# Patient Record
Sex: MALE | Race: WHITE | NOT HISPANIC OR LATINO | Employment: OTHER | URBAN - METROPOLITAN AREA
[De-identification: names, ages, dates, MRNs, and addresses within clinical notes are randomized per-mention and may not be internally consistent; named-entity substitution may affect disease eponyms.]

---

## 2017-01-11 ENCOUNTER — ANTICOAGULATION THERAPY VISIT (OUTPATIENT)
Dept: ANTICOAGULATION | Facility: CLINIC | Age: 77
End: 2017-01-11

## 2017-01-11 DIAGNOSIS — I48.91 ATRIAL FIBRILLATION, UNSPECIFIED TYPE (H): ICD-10-CM

## 2017-01-11 DIAGNOSIS — Z79.01 LONG-TERM (CURRENT) USE OF ANTICOAGULANTS: Primary | ICD-10-CM

## 2017-01-11 LAB — INR PPP: 2.7

## 2017-01-11 NOTE — PROGRESS NOTES
ANTICOAGULATION FOLLOW-UP CLINIC VISIT    Patient Name:  Fredrick Mehta  Date:  1/11/2017  Contact Type:  Telephone    SUBJECTIVE:     Patient Findings     Positives Unexplained INR or factor level change           OBJECTIVE    INR   Date Value Ref Range Status   01/11/2017 2.7  Final       ASSESSMENT / PLAN  INR assessment SUPRA    Recheck INR In: 2 WEEKS    INR Location Outside lab      Anticoagulation Summary as of 1/11/2017     INR goal 2.0-2.5   Selected INR 2.7! (1/11/2017)   Maintenance plan 7.5 mg (5 mg x 1.5) on Sun, Tue, Thu; 5 mg (5 mg x 1) all other days   Full instructions 7.5 mg on Sun, Tue, Thu; 5 mg all other days   Weekly total 42.5 mg   Plan last modified Gracie Carrington RN (1/11/2017)   Next INR check 1/30/2017   Priority INR   Target end date     Indications   Long-term (current) use of anticoagulants [Z79.01] [Z79.01]  Atrial fibrillation (H) [I48.91]         Anticoagulation Episode Summary     INR check location     Preferred lab     Send INR reminders to [facility]    Comments ++New Goal Range 8/3/16  Dr Sarkar  2.0 to 2.5++ Contact at [phone]. Labs in [place].      Anticoagulation Care Providers     Provider Role Specialty Phone number    Yobani Sarkar MD [facility] Cardiology [phone]            See the Encounter Report to view Anticoagulation Flowsheet and Dosing Calendar (Go to Encounters tab in chart review, and find the Anticoagulation Therapy Visit)    Spoke with Fredrick. The only change he reports is now being in [place]. Will reduce dose and check in 2 weeks.     Gracie Carrington, RN

## 2017-01-24 LAB — INR PPP: 2.1

## 2017-01-25 ENCOUNTER — ANTICOAGULATION THERAPY VISIT (OUTPATIENT)
Dept: ANTICOAGULATION | Facility: CLINIC | Age: 77
End: 2017-01-25

## 2017-01-25 DIAGNOSIS — I48.91 ATRIAL FIBRILLATION, UNSPECIFIED TYPE (H): ICD-10-CM

## 2017-01-25 DIAGNOSIS — Z79.01 LONG-TERM (CURRENT) USE OF ANTICOAGULANTS: Primary | ICD-10-CM

## 2017-01-25 NOTE — PROGRESS NOTES
ANTICOAGULATION FOLLOW-UP CLINIC VISIT    Patient Name:  Fredrick Mehta  Date:  1/25/2017  Contact Type:  Telephone    SUBJECTIVE:     Patient Findings     Positives No Problem Findings           OBJECTIVE    INR   Date Value Ref Range Status   01/24/2017 2.10  Final     Comment:     Outside Lab       ASSESSMENT / PLAN  INR assessment THER    Recheck INR In: 2 WEEKS    INR Location Outside lab      Anticoagulation Summary as of 1/25/2017     INR goal 2.0-2.5   Selected INR 2.10 (1/24/2017)   Maintenance plan 7.5 mg (5 mg x 1.5) on Sun, Tue, Thu; 5 mg (5 mg x 1) all other days   Full instructions 7.5 mg on Sun, Tue, Thu; 5 mg all other days   Weekly total 42.5 mg   Plan last modified Gracie Carrington RN (1/11/2017)   Next INR check 2/8/2017   Priority INR   Target end date     Indications   Long-term (current) use of anticoagulants [Z79.01] [Z79.01]  Atrial fibrillation (H) [I48.91]         Anticoagulation Episode Summary     INR check location     Preferred lab     Send INR reminders to [facility]    Comments ++New Goal Range 8/3/16  Dr Sarkar  2.0 to 2.5++ Contact at [phone]. Labs in [place].      Anticoagulation Care Providers     Provider Role Specialty Phone number    Yobani Sarkar MD [facility] Cardiology [phone]            See the Encounter Report to view Anticoagulation Flowsheet and Dosing Calendar (Go to Encounters tab in chart review, and find the Anticoagulation Therapy Visit)    Spoke with Fredrick. Will get another INR in two weeks and if ok then will go back to 4 weeks    Fiona Wiley RN

## 2017-02-08 LAB — INR PPP: 2.4

## 2017-02-09 ENCOUNTER — ANTICOAGULATION THERAPY VISIT (OUTPATIENT)
Dept: ANTICOAGULATION | Facility: CLINIC | Age: 77
End: 2017-02-09

## 2017-02-09 DIAGNOSIS — Z79.01 LONG-TERM (CURRENT) USE OF ANTICOAGULANTS: Primary | ICD-10-CM

## 2017-02-09 DIAGNOSIS — I48.91 ATRIAL FIBRILLATION, UNSPECIFIED TYPE (H): ICD-10-CM

## 2017-02-09 NOTE — PROGRESS NOTES
ANTICOAGULATION FOLLOW-UP CLINIC VISIT    Patient Name:  Fredrick Mehta  Date:  2/9/2017  Contact Type:  Telephone    SUBJECTIVE:     Patient Findings     Positives No Problem Findings           OBJECTIVE    INR   Date Value Ref Range Status   02/08/2017 2.4  Final       ASSESSMENT / PLAN  INR assessment THER    Recheck INR In: 4 WEEKS    INR Location Outside lab      Anticoagulation Summary as of 2/9/2017     INR goal 2.0-2.5   Selected INR 2.4 (2/8/2017)   Maintenance plan 7.5 mg (5 mg x 1.5) on Sun, Tue, Thu; 5 mg (5 mg x 1) all other days   Full instructions 7.5 mg on Sun, Tue, Thu; 5 mg all other days   Weekly total 42.5 mg   No change documented Dana Matthews, RN   Plan last modified Gracie Carrington RN (1/11/2017)   Next INR check 3/9/2017   Priority INR   Target end date     Indications   Long-term (current) use of anticoagulants [Z79.01] [Z79.01]  Atrial fibrillation (H) [I48.91]         Anticoagulation Episode Summary     INR check location     Preferred lab     Send INR reminders to [facility]    Comments ++New Goal Range 8/3/16  Dr Sarkar  2.0 to 2.5++ Contact at [phone]. Labs in [place].      Anticoagulation Care Providers     Provider Role Specialty Phone number    Yobani Sarkar MD [facility] Cardiology [phone]            See the Encounter Report to view Anticoagulation Flowsheet and Dosing Calendar (Go to Encounters tab in chart review, and find the Anticoagulation Therapy Visit)    Spoke with Fredrick.    Dana Matthews, RN

## 2017-02-20 DIAGNOSIS — Z79.01 LONG TERM CURRENT USE OF ANTICOAGULANT THERAPY: ICD-10-CM

## 2017-02-20 RX ORDER — WARFARIN SODIUM 5 MG/1
TABLET ORAL
Qty: 135 TABLET | Refills: 0 | Status: SHIPPED | OUTPATIENT
Start: 2017-02-20 | End: 2017-06-20

## 2017-02-22 DIAGNOSIS — H40.9 GLAUCOMA: Primary | ICD-10-CM

## 2017-02-22 NOTE — TELEPHONE ENCOUNTER
Travatan  Last Written Prescription Date:  Historical  No dose listed but refill request is for travatan Z 0.004%  Last Office Visit : 11/16/16  Future Office visit:  5/24/17  Progress Notes  Encounter Date: 11/16/2016  Kim Joel MD   Ophthalmology      Primary open angle glaucoma (POAG) mod  Selected laser trabeculoplasty (SLT) left eye 8-1-14     HPI: Overall feels vision stable, no significant changes since last visit. Reports good drop compliance.    Current eye meds:  Travatan at bedtime both eyes     Octopus 24-2 visual field 11/16/16  RE: trace nasal step, improved MD  LE: Superior and inferior arcuate defects- worse MD, could be from cataract     Assessment & Plan:  1. Primary open angle glaucoma (POAG), moderate.  S/p Selected laser trabeculoplasty (SLT) right eye 2007  S/p Selected laser trabeculoplasty (SLT) left eye Aug 2014   Using Tavatan at bedtime both eyes  Octopus 24-2 stable - slightly worse MD left eye (follow closely)     2. Cataract both eyes still 20/20 or better     3. Narrow angles   S/p periperal iridotomy both eyes - patent     Plan:  Visual fields today relatively stable again today, some fluctuation. Trace nasal step OD  IOPs great today on current regimen.   Return in 6 months for visual field 24-2 both eyes  Continue Travatan at bedtime, both eyes        Alberto Lyons MD  PGY3, Dept of Ophthalmology               Routing refill request to provider for review/approval because:  Medication is reported/historical

## 2017-02-24 DIAGNOSIS — H40.1132 PRIMARY OPEN ANGLE GLAUCOMA OF BOTH EYES, MODERATE STAGE: Primary | ICD-10-CM

## 2017-03-05 DIAGNOSIS — E78.5 HYPERLIPIDEMIA LDL GOAL <100: ICD-10-CM

## 2017-03-06 RX ORDER — SIMVASTATIN 40 MG
TABLET ORAL
Qty: 90 TABLET | Refills: 3 | Status: SHIPPED | OUTPATIENT
Start: 2017-03-06 | End: 2017-04-03

## 2017-03-10 LAB — INR PPP: 2.4

## 2017-03-13 ENCOUNTER — ANTICOAGULATION THERAPY VISIT (OUTPATIENT)
Dept: ANTICOAGULATION | Facility: CLINIC | Age: 77
End: 2017-03-13

## 2017-03-13 DIAGNOSIS — Z79.01 LONG-TERM (CURRENT) USE OF ANTICOAGULANTS: ICD-10-CM

## 2017-03-13 DIAGNOSIS — I48.91 ATRIAL FIBRILLATION, UNSPECIFIED TYPE (H): ICD-10-CM

## 2017-03-13 NOTE — PROGRESS NOTES
ANTICOAGULATION FOLLOW-UP CLINIC VISIT    Patient Name:  Fredrick Mehta  Date:  3/13/2017  Contact Type:  Telephone    SUBJECTIVE:        OBJECTIVE    INR   Date Value Ref Range Status   03/10/2017 2.40  Final     Comment:     Outside Lab        ASSESSMENT / PLAN  INR assessment THER    Recheck INR In: 4 WEEKS    INR Location Outside lab      Anticoagulation Summary as of 3/13/2017     INR goal 2.0-2.5   Today's INR 2.40 (3/10/2017)   Maintenance plan 7.5 mg (5 mg x 1.5) on Sun, Tue, Thu; 5 mg (5 mg x 1) all other days   Full instructions 7.5 mg on Sun, Tue, Thu; 5 mg all other days   Weekly total 42.5 mg   Plan last modified Gracie Carrington RN (1/11/2017)   Next INR check 4/7/2017   Priority INR   Target end date     Indications   Long-term (current) use of anticoagulants [Z79.01] [Z79.01]  Atrial fibrillation (H) [I48.91]         Anticoagulation Episode Summary     INR check location     Preferred lab     Send INR reminders to [facility]    Comments ++New Goal Range 8/3/16  Dr Sarkar  2.0 to 2.5++ Contact at [phone]. Labs in [place].      Anticoagulation Care Providers     Provider Role Specialty Phone number    Mello, Yobani Paniagua MD [facility] Cardiology [phone]            See the Encounter Report to view Anticoagulation Flowsheet and Dosing Calendar (Go to Encounters tab in chart review, and find the Anticoagulation Therapy Visit)    Left message for patient with results and dosing recommendations. Asked patient to call back to report any missed doses, falls, signs and symptoms of bleeding or clotting, any changes in health, medication, or diet. Asked patient to call back with any questions or concerns.     Fiona Wiley RN

## 2017-03-13 NOTE — MR AVS SNAPSHOT
Fredrick Mehta   3/13/2017   Anticoagulation Therapy Visit    Description:  76 year old male   Provider:  Fiona Wiley, RN   Department:  [facility]           INR as of 3/13/2017     Today's INR 2.40 (3/10/2017)      Anticoagulation Summary as of 3/13/2017     INR goal 2.0-2.5   Today's INR 2.40 (3/10/2017)   Full instructions 7.5 mg on Sun, Tue, Thu; 5 mg all other days   Next INR check 4/7/2017    Indications   Long-term (current) use of anticoagulants [Z79.01] [Z79.01]  Atrial fibrillation (H) [I48.91]         March 2017 Details    Sun Mon Tue Wed Thu Fri Sat        1               2               3               4                 5               6               7               8               9               10               11                 12               13      5 mg   See details      14      7.5 mg         15      5 mg         16      7.5 mg         17      5 mg         18      5 mg           19      7.5 mg         20      5 mg         21      7.5 mg         22      5 mg         23      7.5 mg         24      5 mg         25      5 mg           26      7.5 mg         27      5 mg         28      7.5 mg         29      5 mg         30      7.5 mg         31      5 mg           Date Details   03/13 This INR check               How to take your warfarin dose     To take:  5 mg Take 1 of the 5 mg tablets.    To take:  7.5 mg Take 1.5 of the 5 mg tablets.           April 2017 Details    Sun Mon Tue Wed Thu Fri Sat           1      5 mg           2      7.5 mg         3      5 mg         4      7.5 mg         5      5 mg         6      7.5 mg         7            8                 9               10               11               12               13               14               15                 16               17               18               19               20               21               22                 23               24               25               26               27                28               29                 30                      Date Details   No additional details    Date of next INR:  4/7/2017         How to take your warfarin dose     To take:  5 mg Take 1 of the 5 mg tablets.    To take:  7.5 mg Take 1.5 of the 5 mg tablets.

## 2017-03-28 ENCOUNTER — CARE COORDINATION (OUTPATIENT)
Dept: CARDIOLOGY | Facility: CLINIC | Age: 77
End: 2017-03-28

## 2017-03-28 DIAGNOSIS — E78.2 MIXED HYPERLIPIDEMIA: Primary | ICD-10-CM

## 2017-03-29 NOTE — PROGRESS NOTES
Received notification from patients insurance company regarding possible interaction with Simvastatin and Coumadin.    Date: 3/29/2017    Time of Call: 10:51 AM     Diagnosis:  Hyperlipidemia     [ TORB ] Ordering provider: Dr Yobani Sarkar  Order: Discontinue Simvastatin.  Start Pravastatin 20 mg daily.  Recheck fasting lipid panel in 4 months     Order received by: Shahid Arroyo RN     Follow-up/additional notes: Discussed recommendations with patient.  Patient states that he understands information provided and will call back with any further questions or concerns.

## 2017-04-03 RX ORDER — PRAVASTATIN SODIUM 20 MG
20 TABLET ORAL DAILY
Qty: 90 TABLET | Refills: 3 | Status: SHIPPED | OUTPATIENT
Start: 2017-04-03 | End: 2017-05-11 | Stop reason: DRUGHIGH

## 2017-04-07 LAB — INR PPP: 2.9

## 2017-04-10 ENCOUNTER — ANTICOAGULATION THERAPY VISIT (OUTPATIENT)
Dept: ANTICOAGULATION | Facility: CLINIC | Age: 77
End: 2017-04-10

## 2017-04-10 DIAGNOSIS — Z79.01 LONG-TERM (CURRENT) USE OF ANTICOAGULANTS: ICD-10-CM

## 2017-04-10 DIAGNOSIS — I48.91 ATRIAL FIBRILLATION, UNSPECIFIED TYPE (H): ICD-10-CM

## 2017-04-10 NOTE — PROGRESS NOTES
ANTICOAGULATION FOLLOW-UP CLINIC VISIT    Patient Name:  Fredrick Mehta  Date:  4/10/2017  Contact Type:  Telephone    SUBJECTIVE:     Patient Findings     Positives Unexplained INR or factor level change           OBJECTIVE    INR   Date Value Ref Range Status   04/07/2017 2.90  Final     Comment:     Outside Lab        ASSESSMENT / PLAN  INR assessment SUPRA    Recheck INR In: 4 WEEKS    INR Location Outside lab      Anticoagulation Summary as of 4/10/2017     INR goal 2.0-2.5   Today's INR 2.90! (4/7/2017)   Maintenance plan 7.5 mg (5 mg x 1.5) on Sun, Tue, Thu; 5 mg (5 mg x 1) all other days   Full instructions 4/10: 2.5 mg; Otherwise 7.5 mg on Sun, Tue, Thu; 5 mg all other days   Weekly total 42.5 mg   Plan last modified Gracie Carrington, RN (1/11/2017)   Next INR check 5/5/2017   Priority INR   Target end date     Indications   Long-term (current) use of anticoagulants [Z79.01] [Z79.01]  Atrial fibrillation (H) [I48.91]         Anticoagulation Episode Summary     INR check location     Preferred lab     Send INR reminders to [facility]    Comments ++New Goal Range 8/3/16  Dr Sarkar  2.0 to 2.5++ Contact at [phone]. Labs in [place].      Anticoagulation Care Providers     Provider Role Specialty Phone number    Yobani Sarkar MD [facility] Cardiology [phone]            See the Encounter Report to view Anticoagulation Flowsheet and Dosing Calendar (Go to Encounters tab in chart review, and find the Anticoagulation Therapy Visit)    Spoke with Fredrick Wiley, TEMO

## 2017-04-10 NOTE — MR AVS SNAPSHOT
Fredrick Mehta   4/10/2017   Anticoagulation Therapy Visit    Description:  76 year old male   Provider:  Fiona Wiley, RN   Department:  [facility]           INR as of 4/10/2017     Today's INR 2.90! (4/7/2017)      Anticoagulation Summary as of 4/10/2017     INR goal 2.0-2.5   Today's INR 2.90! (4/7/2017)   Full instructions 4/10: 2.5 mg; Otherwise 7.5 mg on Sun, Tue, Thu; 5 mg all other days   Next INR check 5/5/2017    Indications   Long-term (current) use of anticoagulants [Z79.01] [Z79.01]  Atrial fibrillation (H) [I48.91]         April 2017 Details    Sun Mon Tue Wed Thu Fri Sat           1                 2               3               4               5               6               7               8                 9               10      2.5 mg   See details      11      7.5 mg         12      5 mg         13      7.5 mg         14      5 mg         15      5 mg           16      7.5 mg         17      5 mg         18      7.5 mg         19      5 mg         20      7.5 mg         21      5 mg         22      5 mg           23      7.5 mg         24      5 mg         25      7.5 mg         26      5 mg         27      7.5 mg         28      5 mg         29      5 mg           30      7.5 mg                Date Details   04/10 This INR check               How to take your warfarin dose     To take:  2.5 mg Take 0.5 of a 5 mg tablet.    To take:  5 mg Take 1 of the 5 mg tablets.    To take:  7.5 mg Take 1.5 of the 5 mg tablets.           May 2017 Details    Sun Mon Tue Wed Thu Fri Sat      1      5 mg         2      7.5 mg         3      5 mg         4      7.5 mg         5            6                 7               8               9               10               11               12               13                 14               15               16               17               18               19               20                 21               22               23               24                25               26               27                 28               29               30               31                   Date Details   No additional details    Date of next INR:  5/5/2017         How to take your warfarin dose     To take:  5 mg Take 1 of the 5 mg tablets.    To take:  7.5 mg Take 1.5 of the 5 mg tablets.

## 2017-04-13 DIAGNOSIS — I48.0 PAROXYSMAL ATRIAL FIBRILLATION (H): ICD-10-CM

## 2017-04-17 RX ORDER — DILTIAZEM HYDROCHLORIDE 120 MG/1
CAPSULE, EXTENDED RELEASE ORAL
Qty: 180 CAPSULE | Refills: 3 | Status: SHIPPED | OUTPATIENT
Start: 2017-04-17 | End: 2017-08-02

## 2017-05-04 ENCOUNTER — TELEPHONE (OUTPATIENT)
Dept: CARDIOLOGY | Facility: CLINIC | Age: 77
End: 2017-05-04

## 2017-05-04 NOTE — TELEPHONE ENCOUNTER
Patients wife called asking about her husbands medication: TAZTIA  MG 24 hr ER beaded capsule.  She said her  has been taking one capsule a day but she just read the bottle and it says take one capsule twice a day.  She would like someone to call her back (she said to leave a message).

## 2017-05-08 ENCOUNTER — ANTICOAGULATION THERAPY VISIT (OUTPATIENT)
Dept: ANTICOAGULATION | Facility: CLINIC | Age: 77
End: 2017-05-08

## 2017-05-08 DIAGNOSIS — I48.91 ATRIAL FIBRILLATION, UNSPECIFIED TYPE (H): ICD-10-CM

## 2017-05-08 DIAGNOSIS — Z79.01 LONG-TERM (CURRENT) USE OF ANTICOAGULANTS: ICD-10-CM

## 2017-05-08 NOTE — MR AVS SNAPSHOT
Fredrick Janine   5/8/2017   Anticoagulation Therapy Visit    Description:  76 year old male   Provider:  Gracie Carrington, RN   Department:  [facility]           INR as of 5/8/2017     Today's INR       Anticoagulation Summary as of 5/8/2017     INR goal 2.0-2.5   Today's INR    Next INR check     Indications   Long-term (current) use of anticoagulants [Z79.01] [Z79.01]  Atrial fibrillation (H) [I48.91]         April 2017 Details    Sun Mon Tue Wed Thu Fri Sat           1                 2               3               4               5               6               7               8                 9               10      2.5 mg   See details      11      7.5 mg         12      5 mg         13      7.5 mg         14      5 mg         15      5 mg           16      7.5 mg         17      5 mg         18      7.5 mg         19      5 mg         20      7.5 mg         21      5 mg         22      5 mg           23      7.5 mg         24      5 mg         25      7.5 mg         26      5 mg         27      7.5 mg         28      5 mg         29      5 mg           30      7.5 mg                Date Details   04/10 This INR check               How to take your warfarin dose     To take:  2.5 mg Take 0.5 of a 5 mg tablet.    To take:  5 mg Take 1 of the 5 mg tablets.    To take:  7.5 mg Take 1.5 of the 5 mg tablets.           May 2017 Details    Sun Mon Tue Wed Thu Fri Sat      1      5 mg         2      7.5 mg         3      5 mg         4      7.5 mg         5            6                 7               8               9               10               11               12               13                 14               15               16               17               18               19               20                 21               22               23               24               25               26               27                 28               29               30               31                    Date Details   No additional details    Date of next INR:  5/5/2017         How to take your warfarin dose     To take:  5 mg Take 1 of the 5 mg tablets.    To take:  7.5 mg Take 1.5 of the 5 mg tablets.

## 2017-05-09 ENCOUNTER — ANTICOAGULATION THERAPY VISIT (OUTPATIENT)
Dept: ANTICOAGULATION | Facility: CLINIC | Age: 77
End: 2017-05-09

## 2017-05-09 DIAGNOSIS — Z79.01 LONG-TERM (CURRENT) USE OF ANTICOAGULANTS: ICD-10-CM

## 2017-05-09 DIAGNOSIS — I48.91 ATRIAL FIBRILLATION, UNSPECIFIED TYPE (H): ICD-10-CM

## 2017-05-09 DIAGNOSIS — E78.2 MIXED HYPERLIPIDEMIA: ICD-10-CM

## 2017-05-09 LAB
CHOLEST SERPL-MCNC: 201 MG/DL
HDLC SERPL-MCNC: 62 MG/DL
INR PPP: 2.04 (ref 0.86–1.14)
LDLC SERPL CALC-MCNC: 106 MG/DL
NONHDLC SERPL-MCNC: 139 MG/DL
TRIGL SERPL-MCNC: 161 MG/DL

## 2017-05-09 NOTE — MR AVS SNAPSHOT
Fredrick Mehta   5/9/2017   Anticoagulation Therapy Visit    Description:  76 year old male   Provider:  Fiona Wilye, RN   Department:  [facility]           INR as of 5/9/2017     Today's INR 2.04      Anticoagulation Summary as of 5/9/2017     INR goal 2.0-2.5   Today's INR 2.04   Full instructions 7.5 mg on Sun, Tue, Thu; 5 mg all other days   Next INR check 6/6/2017    Indications   Long-term (current) use of anticoagulants [Z79.01] [Z79.01]  Atrial fibrillation (H) [I48.91]         May 2017 Details    Sun Mon Tue Wed Thu Fri Sat      1               2               3               4               5               6                 7               8               9      7.5 mg   See details      10      5 mg         11      7.5 mg         12      5 mg         13      5 mg           14      7.5 mg         15      5 mg         16      7.5 mg         17      5 mg         18      7.5 mg         19      5 mg         20      5 mg           21      7.5 mg         22      5 mg         23      7.5 mg         24      5 mg         25      7.5 mg         26      5 mg         27      5 mg           28      7.5 mg         29      5 mg         30      7.5 mg         31      5 mg             Date Details   05/09 This INR check               How to take your warfarin dose     To take:  5 mg Take 1 of the 5 mg tablets.    To take:  7.5 mg Take 1.5 of the 5 mg tablets.           June 2017 Details    Sun Mon Tue Wed Thu Fri Sat         1      7.5 mg         2      5 mg         3      5 mg           4      7.5 mg         5      5 mg         6            7               8               9               10                 11               12               13               14               15               16               17                 18               19               20               21               22               23               24                 25               26               27               28                29 30                 Date Details   No additional details    Date of next INR:  6/6/2017         How to take your warfarin dose     To take:  5 mg Take 1 of the 5 mg tablets.    To take:  7.5 mg Take 1.5 of the 5 mg tablets.

## 2017-05-09 NOTE — PROGRESS NOTES
ANTICOAGULATION FOLLOW-UP CLINIC VISIT    Patient Name:  Fredrick Mehta  Date:  5/9/2017  Contact Type:  Telephone    SUBJECTIVE:        OBJECTIVE    INR   Date Value Ref Range Status   05/09/2017 2.04 (H) 0.86 - 1.14 Final       ASSESSMENT / PLAN  INR assessment THER    Recheck INR In: 4 WEEKS    INR Location Clinic      Anticoagulation Summary as of 5/9/2017     INR goal 2.0-2.5   Today's INR 2.04   Maintenance plan 7.5 mg (5 mg x 1.5) on Sun, Tue, Thu; 5 mg (5 mg x 1) all other days   Full instructions 7.5 mg on Sun, Tue, Thu; 5 mg all other days   Weekly total 42.5 mg   Plan last modified Gracie Carrington RN (1/11/2017)   Next INR check 6/6/2017   Priority INR   Target end date     Indications   Long-term (current) use of anticoagulants [Z79.01] [Z79.01]  Atrial fibrillation (H) [I48.91]         Anticoagulation Episode Summary     INR check location     Preferred lab     Send INR reminders to [facility]    Comments ++New Goal Range 8/3/16  Dr Sarkar  2.0 to 2.5++ Contact at [phone]. Labs in [place].      Anticoagulation Care Providers     Provider Role Specialty Phone number    Yobani Sarkar MD [facility] Cardiology [phone]            See the Encounter Report to view Anticoagulation Flowsheet and Dosing Calendar (Go to Encounters tab in chart review, and find the Anticoagulation Therapy Visit)    Left message for patient with results and dosing recommendations. Asked patient to call back to report any missed doses, falls, signs and symptoms of bleeding or clotting, any changes in health, medication, or diet. Asked patient to call back with any questions or concerns.     Fiona Wiley RN

## 2017-05-11 ENCOUNTER — CARE COORDINATION (OUTPATIENT)
Dept: CARDIOLOGY | Facility: CLINIC | Age: 77
End: 2017-05-11

## 2017-05-11 DIAGNOSIS — E78.2 MIXED HYPERLIPIDEMIA: Primary | ICD-10-CM

## 2017-05-11 RX ORDER — PRAVASTATIN SODIUM 20 MG
20 TABLET ORAL DAILY
Qty: 90 TABLET | Refills: 3 | Status: SHIPPED | OUTPATIENT
Start: 2017-05-11 | End: 2018-06-18

## 2017-05-11 RX ORDER — PRAVASTATIN SODIUM 10 MG
10 TABLET ORAL DAILY
Qty: 90 TABLET | Refills: 3 | Status: SHIPPED | OUTPATIENT
Start: 2017-05-11 | End: 2018-03-10

## 2017-05-11 NOTE — PROGRESS NOTES
Date: 5/11/2017    Time of Call: 11:15 AM     Diagnosis:  Hyperlipidemia     [ TORB ] Ordering provider: Dr Yobani Sarkar  Order: Increase Pravastatin 30 mg every eveing     Order received by: Shahid Arroyo RN     Follow-up/additional notes: Patient called with results and recommendations from Dr Sarkar.  Patient states that he understands information provided and will call back with any further questions or concerns.

## 2017-05-16 ENCOUNTER — TRANSFERRED RECORDS (OUTPATIENT)
Dept: HEALTH INFORMATION MANAGEMENT | Facility: CLINIC | Age: 77
End: 2017-05-16

## 2017-06-06 ENCOUNTER — ANTICOAGULATION THERAPY VISIT (OUTPATIENT)
Dept: ANTICOAGULATION | Facility: CLINIC | Age: 77
End: 2017-06-06

## 2017-06-06 DIAGNOSIS — Z79.01 LONG-TERM (CURRENT) USE OF ANTICOAGULANTS: ICD-10-CM

## 2017-06-06 DIAGNOSIS — I48.91 ATRIAL FIBRILLATION, UNSPECIFIED TYPE (H): ICD-10-CM

## 2017-06-06 LAB — INR PPP: 2.3 (ref 0.86–1.14)

## 2017-06-06 NOTE — PROGRESS NOTES
ANTICOAGULATION FOLLOW-UP CLINIC VISIT    Patient Name:  Fredrick Mehta  Date:  6/6/2017  Contact Type:  Telephone    SUBJECTIVE:        OBJECTIVE    INR   Date Value Ref Range Status   06/06/2017 2.30 (H) 0.86 - 1.14 Final       ASSESSMENT / PLAN  INR assessment THER    Recheck INR In: 5 WEEKS    INR Location Clinic      Anticoagulation Summary as of 6/6/2017     INR goal 2.0-2.5   Today's INR 2.30   Maintenance plan 7.5 mg (5 mg x 1.5) on Sun, Tue, Thu; 5 mg (5 mg x 1) all other days   Full instructions 7.5 mg on Sun, Tue, Thu; 5 mg all other days   Weekly total 42.5 mg   Plan last modified Gracie Carrington RN (1/11/2017)   Next INR check 7/11/2017   Priority INR   Target end date     Indications   Long-term (current) use of anticoagulants [Z79.01] [Z79.01]  Atrial fibrillation (H) [I48.91]         Anticoagulation Episode Summary     INR check location     Preferred lab     Send INR reminders to [facility]    Comments ++New Goal Range 8/3/16  Dr Sarkar  2.0 to 2.5++ Contact at [phone]. Labs in [place].      Anticoagulation Care Providers     Provider Role Specialty Phone number    Yobani Sarkar MD [facility] Cardiology [phone]            See the Encounter Report to view Anticoagulation Flowsheet and Dosing Calendar (Go to Encounters tab in chart review, and find the Anticoagulation Therapy Visit)    Left message for patient with results and dosing recommendations. Asked patient to call back to report any missed doses, falls, signs and symptoms of bleeding or clotting, any changes in health, medication, or diet. Asked patient to call back with any questions or concerns.     Fiona Wiley RN

## 2017-06-06 NOTE — MR AVS SNAPSHOT
Fredrick Mehta   6/6/2017   Anticoagulation Therapy Visit    Description:  76 year old male   Provider:  Fiona Wiley, RN   Department:  [facility]           INR as of 6/6/2017     Today's INR 2.30      Anticoagulation Summary as of 6/6/2017     INR goal 2.0-2.5   Today's INR 2.30   Full instructions 7.5 mg on Sun, Tue, Thu; 5 mg all other days   Next INR check 7/11/2017    Indications   Long-term (current) use of anticoagulants [Z79.01] [Z79.01]  Atrial fibrillation (H) [I48.91]         June 2017 Details    Sun Mon Tue Wed Thu Fri Sat         1               2               3                 4               5               6      7.5 mg   See details      7      5 mg         8      7.5 mg         9      5 mg         10      5 mg           11      7.5 mg         12      5 mg         13      7.5 mg         14      5 mg         15      7.5 mg         16      5 mg         17      5 mg           18      7.5 mg         19      5 mg         20      7.5 mg         21      5 mg         22      7.5 mg         23      5 mg         24      5 mg           25      7.5 mg         26      5 mg         27      7.5 mg         28      5 mg         29      7.5 mg         30      5 mg           Date Details   06/06 This INR check               How to take your warfarin dose     To take:  5 mg Take 1 of the 5 mg tablets.    To take:  7.5 mg Take 1.5 of the 5 mg tablets.           July 2017 Details    Sun Mon Tue Wed Thu Fri Sat           1      5 mg           2      7.5 mg         3      5 mg         4      7.5 mg         5      5 mg         6      7.5 mg         7      5 mg         8      5 mg           9      7.5 mg         10      5 mg         11            12               13               14               15                 16               17               18               19               20               21               22                 23               24               25               26               27                28               29                 30               31                     Date Details   No additional details    Date of next INR:  7/11/2017         How to take your warfarin dose     To take:  5 mg Take 1 of the 5 mg tablets.    To take:  7.5 mg Take 1.5 of the 5 mg tablets.

## 2017-06-20 DIAGNOSIS — Z79.01 LONG TERM CURRENT USE OF ANTICOAGULANT THERAPY: ICD-10-CM

## 2017-06-20 RX ORDER — WARFARIN SODIUM 5 MG/1
TABLET ORAL
Qty: 135 TABLET | Refills: 0 | Status: SHIPPED | OUTPATIENT
Start: 2017-06-20 | End: 2017-10-05

## 2017-07-11 ENCOUNTER — HOSPITAL ENCOUNTER (OUTPATIENT)
Facility: CLINIC | Age: 77
Setting detail: SPECIMEN
Discharge: HOME | End: 2017-07-11
Admitting: INTERNAL MEDICINE
Payer: MEDICARE

## 2017-07-11 ENCOUNTER — ANTICOAGULATION THERAPY VISIT (OUTPATIENT)
Dept: ANTICOAGULATION | Facility: CLINIC | Age: 77
End: 2017-07-11

## 2017-07-11 DIAGNOSIS — I48.91 ATRIAL FIBRILLATION, UNSPECIFIED TYPE (H): ICD-10-CM

## 2017-07-11 DIAGNOSIS — Z79.01 LONG-TERM (CURRENT) USE OF ANTICOAGULANTS: ICD-10-CM

## 2017-07-11 LAB — INR PPP: 1.73 (ref 0.86–1.14)

## 2017-07-11 PROCEDURE — 85610 PROTHROMBIN TIME: CPT | Performed by: INTERNAL MEDICINE

## 2017-07-11 PROCEDURE — 36415 COLL VENOUS BLD VENIPUNCTURE: CPT | Performed by: INTERNAL MEDICINE

## 2017-07-11 NOTE — PROGRESS NOTES
ANTICOAGULATION FOLLOW-UP CLINIC VISIT    Patient Name:  Fredrick Mehta  Date:  7/11/2017  Contact Type:  Telephone    SUBJECTIVE:        OBJECTIVE    INR   Date Value Ref Range Status   07/11/2017 1.73 (H) 0.86 - 1.14 Final       ASSESSMENT / PLAN  INR assessment SUB    Recheck INR In: 3 WEEKS    INR Location Clinic      Anticoagulation Summary as of 7/11/2017     INR goal 2.0-2.5   Today's INR 1.73!   Maintenance plan 7.5 mg (5 mg x 1.5) on Sun, Tue, Thu; 5 mg (5 mg x 1) all other days   Full instructions 7/11: 10 mg; Otherwise 7.5 mg on Sun, Tue, Thu; 5 mg all other days   Weekly total 42.5 mg   Plan last modified Gracie Carrington RN (1/11/2017)   Next INR check 8/2/2017   Priority INR   Target end date     Indications   Long-term (current) use of anticoagulants [Z79.01] [Z79.01]  Atrial fibrillation (H) [I48.91]         Anticoagulation Episode Summary     INR check location     Preferred lab     Send INR reminders to [facility]    Comments ++New Goal Range 8/3/16  Dr Sarkar  2.0 to 2.5++ Contact at [phone]. Labs in [place].      Anticoagulation Care Providers     Provider Role Specialty Phone number    Yobani Sarkar MD [facility] Cardiology [phone]            See the Encounter Report to view Anticoagulation Flowsheet and Dosing Calendar (Go to Encounters tab in chart review, and find the Anticoagulation Therapy Visit)    Left message for patient with results and dosing recommendations. Asked patient to call back to report any missed doses, falls, signs and symptoms of bleeding or clotting, any changes in health, medication, or diet. Asked patient to call back with any questions or concerns.     Fiona Wiley RN

## 2017-07-11 NOTE — MR AVS SNAPSHOT
Fredrick Mehta   7/11/2017   Anticoagulation Therapy Visit    Description:  76 year old male   Provider:  Fiona Wiley, RN   Department:  [facility]           INR as of 7/11/2017     Today's INR 1.73!      Anticoagulation Summary as of 7/11/2017     INR goal 2.0-2.5   Today's INR 1.73!   Full instructions 7/11: 10 mg; Otherwise 7.5 mg on Sun, Tue, Thu; 5 mg all other days   Next INR check 8/2/2017    Indications   Long-term (current) use of anticoagulants [Z79.01] [Z79.01]  Atrial fibrillation (H) [I48.91]         July 2017 Details    Sun Mon Tue Wed Thu Fri Sat           1                 2               3               4               5               6               7               8                 9               10               11      10 mg   See details      12      5 mg         13      7.5 mg         14      5 mg         15      5 mg           16      7.5 mg         17      5 mg         18      7.5 mg         19      5 mg         20      7.5 mg         21      5 mg         22      5 mg           23      7.5 mg         24      5 mg         25      7.5 mg         26      5 mg         27      7.5 mg         28      5 mg         29      5 mg           30      7.5 mg         31      5 mg               Date Details   07/11 This INR check               How to take your warfarin dose     To take:  5 mg Take 1 of the 5 mg tablets.    To take:  7.5 mg Take 1.5 of the 5 mg tablets.    To take:  10 mg Take 2 of the 5 mg tablets.           August 2017 Details    Sun Mon Tue Wed Thu Fri Sat       1      7.5 mg         2            3               4               5                 6               7               8               9               10               11               12                 13               14               15               16               17               18               19                 20               21               22               23               24               25                26                 27               28               29               30               31                  Date Details   No additional details    Date of next INR:  8/2/2017         How to take your warfarin dose     To take:  5 mg Take 1 of the 5 mg tablets.    To take:  7.5 mg Take 1.5 of the 5 mg tablets.

## 2017-08-01 ENCOUNTER — PRE VISIT (OUTPATIENT)
Dept: CARDIOLOGY | Facility: CLINIC | Age: 77
End: 2017-08-01

## 2017-08-02 ENCOUNTER — OFFICE VISIT (OUTPATIENT)
Dept: OPHTHALMOLOGY | Facility: CLINIC | Age: 77
End: 2017-08-02
Attending: OPHTHALMOLOGY
Payer: MEDICARE

## 2017-08-02 ENCOUNTER — OFFICE VISIT (OUTPATIENT)
Dept: CARDIOLOGY | Facility: CLINIC | Age: 77
End: 2017-08-02
Attending: INTERNAL MEDICINE
Payer: MEDICARE

## 2017-08-02 ENCOUNTER — ANTICOAGULATION THERAPY VISIT (OUTPATIENT)
Dept: ANTICOAGULATION | Facility: CLINIC | Age: 77
End: 2017-08-02

## 2017-08-02 VITALS
HEIGHT: 71 IN | BODY MASS INDEX: 24.19 KG/M2 | DIASTOLIC BLOOD PRESSURE: 71 MMHG | HEART RATE: 59 BPM | WEIGHT: 172.8 LBS | SYSTOLIC BLOOD PRESSURE: 111 MMHG | OXYGEN SATURATION: 97 %

## 2017-08-02 DIAGNOSIS — I10 ESSENTIAL HYPERTENSION: Primary | ICD-10-CM

## 2017-08-02 DIAGNOSIS — H40.1190 POAG (PRIMARY OPEN-ANGLE GLAUCOMA): Primary | ICD-10-CM

## 2017-08-02 DIAGNOSIS — I48.91 ATRIAL FIBRILLATION, UNSPECIFIED TYPE (H): ICD-10-CM

## 2017-08-02 DIAGNOSIS — H40.1190 POAG (PRIMARY OPEN-ANGLE GLAUCOMA): ICD-10-CM

## 2017-08-02 DIAGNOSIS — Z79.01 LONG-TERM (CURRENT) USE OF ANTICOAGULANTS: ICD-10-CM

## 2017-08-02 DIAGNOSIS — H40.1132 PRIMARY OPEN ANGLE GLAUCOMA OF BOTH EYES, MODERATE STAGE: Primary | ICD-10-CM

## 2017-08-02 DIAGNOSIS — I48.0 PAROXYSMAL ATRIAL FIBRILLATION (H): ICD-10-CM

## 2017-08-02 LAB — INR PPP: 1.8 (ref 0.86–1.14)

## 2017-08-02 PROCEDURE — 99213 OFFICE O/P EST LOW 20 MIN: CPT | Mod: ZP | Performed by: INTERNAL MEDICINE

## 2017-08-02 PROCEDURE — 36415 COLL VENOUS BLD VENIPUNCTURE: CPT | Performed by: INTERNAL MEDICINE

## 2017-08-02 PROCEDURE — 85610 PROTHROMBIN TIME: CPT | Performed by: INTERNAL MEDICINE

## 2017-08-02 PROCEDURE — 99212 OFFICE O/P EST SF 10 MIN: CPT | Mod: ZF

## 2017-08-02 PROCEDURE — 99213 OFFICE O/P EST LOW 20 MIN: CPT | Mod: ZF

## 2017-08-02 RX ORDER — DILTIAZEM HYDROCHLORIDE 120 MG/1
120 TABLET, FILM COATED ORAL 4 TIMES DAILY
COMMUNITY
End: 2017-08-02 | Stop reason: DRUGHIGH

## 2017-08-02 RX ORDER — DILTIAZEM HYDROCHLORIDE 240 MG/1
240 CAPSULE, EXTENDED RELEASE ORAL
Qty: 180 CAPSULE | Refills: 3 | Status: SHIPPED | OUTPATIENT
Start: 2017-08-02 | End: 2017-12-27

## 2017-08-02 ASSESSMENT — SLIT LAMP EXAM - LIDS
COMMENTS: NORMAL
COMMENTS: NORMAL

## 2017-08-02 ASSESSMENT — VISUAL ACUITY
OD_CC+: +2
METHOD: SNELLEN - LINEAR
OS_CC: 20/20
OS_CC+: -3
OD_CC: 20/20

## 2017-08-02 ASSESSMENT — CONF VISUAL FIELD
OS_NORMAL: 1
OD_NORMAL: 1
METHOD: COUNTING FINGERS

## 2017-08-02 ASSESSMENT — REFRACTION_WEARINGRX
OD_CYLINDER: +1.00
OD_SPHERE: +4.25
OS_CYLINDER: +1.00
OD_AXIS: 137
OS_SPHERE: +4.00
OS_AXIS: 097

## 2017-08-02 ASSESSMENT — TONOMETRY
IOP_METHOD: APPLANATION
OD_IOP_MMHG: 16
OS_IOP_MMHG: 17
IOP_METHOD: APPLANATION
OD_IOP_MMHG: 15
OS_IOP_MMHG: 16

## 2017-08-02 ASSESSMENT — PAIN SCALES - GENERAL: PAINLEVEL: NO PAIN (0)

## 2017-08-02 NOTE — PATIENT INSTRUCTIONS
You were seen today in the Cardiovascular Clinic at the [facility].      Cardiology Providers you saw during your visit:  Dr. Sarkar    Recommendations: 1) Please stop both Taztia and diltiazem 120 mg. 2) Please start Diltiazem 240 mg twice daily by mouth.    Follow-up:  In 1 year.    Thank you for your visit today!       Please call if you have any questions or concerns.  Cardiology Care Coordinator  Yelitza Blake RN    For scheduling needs [phone] option 1 and the option 1 again.  Nursing questions: [phone] option 1 then chose option 3 for the triage nurse.  For emergencies call [phone].

## 2017-08-02 NOTE — MR AVS SNAPSHOT
Fredrick Mehta   8/2/2017   Anticoagulation Therapy Visit    Description:  76 year old male   Provider:  Dana Matthews, RN   Department:  [facility]           INR as of 8/2/2017     Today's INR 1.80!      Anticoagulation Summary as of 8/2/2017     INR goal 2.0-2.5   Today's INR 1.80!   Full instructions 5 mg on Sun, Tue, Thu; 7.5 mg all other days   Next INR check 8/16/2017    Indications   Long-term (current) use of anticoagulants [Z79.01] [Z79.01]  Atrial fibrillation (H) [I48.91]         August 2017 Details    Sun Mon Tue Wed Thu Fri Sat       1               2      7.5 mg   See details      3      5 mg         4      7.5 mg         5      7.5 mg           6      5 mg         7      7.5 mg         8      5 mg         9      7.5 mg         10      5 mg         11      7.5 mg         12      7.5 mg           13      5 mg         14      7.5 mg         15      5 mg         16            17               18               19                 20               21               22               23               24               25               26                 27               28               29               30               31                  Date Details   08/02 This INR check       Date of next INR:  8/16/2017         How to take your warfarin dose     To take:  5 mg Take 1 of the 5 mg tablets.    To take:  7.5 mg Take 1.5 of the 5 mg tablets.

## 2017-08-02 NOTE — PROGRESS NOTES
ANTICOAGULATION FOLLOW-UP CLINIC VISIT    Patient Name:  Fredrick Mehta  Date:  8/2/2017  Contact Type:  Telephone    SUBJECTIVE:        OBJECTIVE    INR   Date Value Ref Range Status   08/02/2017 1.80 (H) 0.86 - 1.14 Final       ASSESSMENT / PLAN  INR assessment SUB    Recheck INR In: 2 WEEKS    INR Location Clinic      Anticoagulation Summary as of 8/2/2017     INR goal 2.0-2.5   Today's INR 1.80!   Maintenance plan 5 mg (5 mg x 1) on Sun, Tue, Thu; 7.5 mg (5 mg x 1.5) all other days   Full instructions 5 mg on Sun, Tue, Thu; 7.5 mg all other days   Weekly total 45 mg   Plan last modified Dana Matthews RN (8/2/2017)   Next INR check 8/16/2017   Priority INR   Target end date     Indications   Long-term (current) use of anticoagulants [Z79.01] [Z79.01]  Atrial fibrillation (H) [I48.91]         Anticoagulation Episode Summary     INR check location     Preferred lab     Send INR reminders to [facility]    Comments ++New Goal Range 8/3/16  Dr Sarkar  2.0 to 2.5++ Contact at [phone]. Labs in [place].      Anticoagulation Care Providers     Provider Role Specialty Phone number    Mello, Yobani Paniagua MD [facility] Cardiology [phone]            See the Encounter Report to view Anticoagulation Flowsheet and Dosing Calendar (Go to Encounters tab in chart review, and find the Anticoagulation Therapy Visit)    Left message for patient with results and dosing recommendations. Asked patient to call back to report any missed doses, falls, signs and symptoms of bleeding or clotting, any changes in health, medication, or diet. Asked patient to call back with any questions or concerns.     Dana Matthews RN

## 2017-08-02 NOTE — NURSING NOTE
Chief Complaint   Patient presents with     Follow Up For     AFib, HLD     Vitals were taken and medications were reconciled.     Neri Vitale, VIVI  9:17 AM

## 2017-08-02 NOTE — LETTER
8/2/2017      RE: Fredrick Mehta  [address]  [place] [address]       Dear Colleague,    Thank you for the opportunity to participate in the care of your patient, Fredrick Mehta, at the [facility] HEART [facility]. Please see a copy of my visit note below.    See iMedX dictation [id number]    CARDIOLOGY CLINIC NOTE      Dear Dr. Ulloa:     It was a pleasure participating in the care of your patient, . Fredrick Mehta.  As you know, he is a 76-year-old, retired  whom I had seen today for paroxysmal atrial fibrillation.    PAST MEDICAL HISTORY:      1.  Hyperlipidemia.  2.  Prostatectomy 2009.  3.  Right femur fracture.  4.  Benign positional vertigo.  5.  Migraine headaches.  6.  Alzheimer's dementia.    History of the present illness:  His cardiac history is significant for paroxysmal atrial fibrillation.  As you recall, he was originally lifting some heavy furniture at home, and after putting an event monitor on, he was found to be in atrial fibrillation with a rapid rate.  He spontaneously converted to normal sinus rhythm and has been managed on Coumadin and diltiazem since that time.    His episodes of AFib have been manifested as palpitations with weakness in the shoulders, and they have commonly occurred in the late hours of the day and could be exacerbated by either large meals or walking outside in the brisk, cold weather.    As you recall, back in 2012, he was forced to walk out in the cold from his car to reach his cardiology appointment, which precipitated an episode of AFib, which spontaneously converted with rest and extra dose of diltiazem.    I last saw him August 3, 2016.  At that time, he was doing well.  He presents today for continuing care.    Interestingly, today I have met his wife for the first time, and she accompanies him due to the fact that the patient has been having memory problems.  In fact, her main question is  that of his medications as the patient has been likely accidentally doubling up on one of his medicines.    Interestingly, also, is the fact that since doubling up on his medications, he has not had any further breakthrough episodes of AFib.  He has been doing quite well.  He denies any significant chest pain, shortness of breath, PND, orthopnea, edema, palpitations, syncope, or near syncope.  He denies black or bloody stools or nose bleeds.  He denies neurologic symptoms.    PRESENT MEDICATIONS:      1.  Taztia  mg twice daily as well as Cardizem 120 twice daily, giving an equivalent of diltiazem  mg twice daily.  2.  Warfarin.  3.  Pravastatin 30 mg a day.  4.  Levothyroxine.    PHYSICAL EXAMINATION:      Vital Signs:  His blood pressure is 111/71, with a pulse of 59.  His weight is 172 pounds.  neck:  Exam reveals no obvious jugular venous distention.  Lungs:  Clear to auscultation.  Respiratory effort is normal.  Cardiac:  Exam reveals a regular rate and rhythm.  No obvious murmur or gallop is appreciated.  abdomen:  His belly is soft, nontender.  Extremities:  Without significant edema.    DIAGNOSTIC STUDIES:  Echocardiogram February 8, 2012, revealed normal LV systolic function with mild MR.  Aortic dimension was 3.8 at that time.    LABS:  May 9, 2017:  Total cholesterol of 201, HDL 62, , triglycerides 161.  September 13, 2016:  Potassium 4.5.  GFR was normal.      IMPRESSION:      Fredrick is a 76-year-old retired  with presumed Alzheimer's dementia, whose cardiac history is significant for paroxysmal atrial fibrillation.  His episodes of atrial fibrillation are typically exacerbated by meals late in the day, or by walking outside in the brisk, cold weather, or by alcohol intake.    Since our last visit, he accidentally doubled up on his diltiazem dose and increased it from 120 mg twice daily to 240 mg twice daily.  Since then, his breakthrough episodes have not recurred, and  this accidental change has been successful in controlling his paroxysmal episodes.      PLAN:      1.  We will consolidate his medications to diltiazem  mg twice daily.    2.  Increase pravastatin from 30 to 40 mg a day.    3.  They request an INR clinic closer to home, which we will help facilitate.    4.  Follow up in 1 year or earlier if needed.    Once again, it was a pleasure participating in the care of your patient, Mr. Fredrick Mehta.  Please feel free to contact me at any time if there are any questions regarding his care in the future.    CC:  Alpesh Ulloa MD, [facility], [address] [place] [address] [place] [address]        Yobani Sarkar MD    D:  08/02/2017 10:50 T:  08/03/2017 07:15  Document:  [id number] RS\SL\CK

## 2017-08-02 NOTE — PROGRESS NOTES
Primary open angle glaucoma (POAG)  mod  Selected laser trabeculoplasty (SLT) left eye 8-1-14    HPI: Overall feels vision stable, no significant changes since last visit in 11/2016.  Reports good drop compliance.    Current eye meds:  Travatan at bedtime both eyes    Octopus 24-2 visual field 8/2/17  RE: reliable, superior defect (?lid) trace nasal step  LE: reliable, superior and inferior arcuate defects     Assessment:  1. Primary open angle glaucoma (POAG), moderate.   S/p Selected laser trabeculoplasty (SLT) right eye 2007   S/p Selected laser trabeculoplasty (SLT) left eye Aug 2014    Using Tavatan at bedtime both eyes   Octopus 24-2 stable - fluctuations but overall stable both eyes    2. Cataract both eyes still 20/20    Monitor    3. Narrow angles    S/p periperal iridotomy both eyes - patent    Plan:  Visual fields today relatively stable again today, some fluctuation  IOPs good today on current regimen  Return in 6 months for dilation and OCT RNFL both eyes  Continue Travatan at bedtime, both eyes    Attending Physician Attestation:  Complete documentation of historical and exam elements from today's encounter can be found in the full encounter summary report (not reduplicated in this progress note). I personally obtained the chief complaint(s) and history of present illness. I confirmed and edited asnecessary the review of systems, past medical/surgical history, family history, social history, and examination findings as documented by others; and I examined the patient myself. I personally reviewed the relevant tests, images, and reports as documented above. I formulated and edited as necessary the assessment and plan and discussed the findings and management plan with the patient and family.  - Kim Joel MD 2:41 PM 8/2/2017

## 2017-08-02 NOTE — MR AVS SNAPSHOT
After Visit Summary   8/2/2017    Fredrick Mehta    MRN: [id number]           Patient Information     Date Of Birth          1940        Visit Information        Provider Department      8/2/2017 1:45 PM Kim Joel MD [facility]        Today's Diagnoses     Primary open angle glaucoma of both eyes, moderate stage    -  1    POAG (primary open-angle glaucoma)           Follow-ups after your visit        Follow-up notes from your care team     Return in about 4 months (around 12/2/2017) for dilation, OCT rnfl.      Your next 10 appointments already scheduled     Dec 06, 2017  2:45 PM CST   RETURN GLAUCOMA with Kim Joel MD   Eye Clinic ([facility])    [address]  [address] [facility]  9th [address] Clin 9a  [facility] [address] [phone]              Who to contact     Please call your clinic at [phone] to:    Ask questions about your health    Make or cancel appointments    Discuss your medicines    Learn about your test results    Speak to your doctor   If you have compliments or concerns about an experience at your clinic, or if you wish to file a complaint, please contact [facility] Patient Relations at [phone] or email us at [email or web address]         Additional Information About Your Visit        MyChart Information     [employer] gives you secure access to your electronic health record. If you see a primary care provider, you can also send messages to your care team and make appointments. If you have questions, please call your primary care clinic.  If you do not have a primary care provider, please call [phone] and they will assist you.      [employer] is an electronic gateway that provides easy, online access to your medical records. With [employer], you can request a clinic appointment, read your test results, renew a prescription or communicate with your care team.     To access your existing account, please  contact your [facility] or call [phone] for assistance.        Care EveryWhere ID     This is your Care EveryWhere ID. This could be used by other organizations to access your [facility] medical records  [id number]         Blood Pressure from Last 3 Encounters:   08/02/17 111/71   09/13/16 137/65   08/03/16 116/72    Weight from Last 3 Encounters:   08/02/17 78.4 kg (172 lb 12.8 oz)   09/13/16 82.6 kg (182 lb)   08/03/16 82.7 kg (182 lb 4.8 oz)              We Performed the Following     OVF 24-2 Dynamic OU          Today's Medication Changes          These changes are accurate as of: 8/2/17  2:45 PM.  If you have any questions, ask your nurse or doctor.               These medicines have changed or have updated prescriptions.        Dose/Directions    * DILTIAZEM HCL PO   This may have changed:    - Another medication with the same name was added. Make sure you understand how and when to take each.  - Another medication with the same name was removed. Continue taking this medication, and follow the directions you see here.   Changed by:  Kim Joel MD        Dose:  240 mg   Take 240 mg by mouth 2 times daily   Refills:  0       * diltiazem 240 MG 24 hr capsule   Commonly known as:  DILACOR XR   This may have changed:  You were already taking a medication with the same name, and this prescription was added. Make sure you understand how and when to take each.   Used for:  Essential hypertension   Replaces:  diltiazem 120 MG tablet   Changed by:  Yobani Sarkar MD        Dose:  240 mg   Take 1 capsule (240 mg) by mouth two times daily   Quantity:  180 capsule   Refills:  3       * Notice:  This list has 2 medication(s) that are the same as other medications prescribed for you. Read the directions carefully, and ask your doctor or other care provider to review them with you.      Stop taking these medicines if you haven't already. Please contact your care team if you have  questions.     diltiazem 120 MG tablet   Commonly known as:  CARDIZEM   Replaced by:  diltiazem 240 MG 24 hr capsule   You also have another medication with the same name that you need to continue taking as instructed.   Stopped by:  Yobani Sarkar MD           TAZTIA  MG 24 hr ER beaded capsule   Generic drug:  diltiazem   Stopped by:  Yobani Sarkar MD                Where to get your medicines      These medications were sent to [facility] [address] [place] [address] AT [address] [address]    Hours:  24-hours Phone:  [phone]     diltiazem 240 MG 24 hr capsule    Travoprost 0.004 % Soln                Primary Care Provider Office Phone # Fax #    Pieter Jadiel Ulloa [phone]       [address] [address]        Equal Access to Services     [facility] : Hadii aad ku hadasho Soomaali, waaxda luqadaha, qaybta kaalmada adeegyada, waxay toniin hayaan shama walton . So [facility] [phone].    ATENCIÓN: Si habla español, tiene a myers disposición servicios gratuitos de asistencia lingüística. Bere al [phone].    We comply with applicable federal civil rights laws and [place] laws. We do not discriminate on the basis of race, color, national origin, age, disability sex, sexual orientation or gender identity.            Thank you!     Thank you for choosing EYE CLINIC  for your care. Our goal is always to provide you with excellent care. Hearing back from our patients is one way we can continue to improve our services. Please take a few minutes to complete the written survey that you may receive in the mail after your visit with us. Thank you!             Your Updated Medication List - Protect others around you: Learn how to safely use, store and throw away your medicines at www.disposemymeds.org.          This list is accurate as of: 8/2/17  2:45 PM.  Always use your most recent  med list.                   Brand Name Dispense Instructions for use Diagnosis    ARICEPT 10 MG tablet   Generic drug:  donepezil     30 tablet    Take 1 tablet (10 mg) by mouth At Bedtime    Memory loss       * DILTIAZEM HCL PO      Take 240 mg by mouth 2 times daily        * diltiazem 240 MG 24 hr capsule    DILACOR XR    180 capsule    Take 1 capsule (240 mg) by mouth two times daily    Essential hypertension       IBUPROFEN PO           levothyroxine 50 MCG tablet    SYNTHROID/LEVOTHROID    90 tablet    Take 1 tablet (50 mcg) by mouth daily    Hypothyroidism, unspecified type       * pravastatin 10 MG tablet    PRAVACHOL    90 tablet    Take 1 tablet (10 mg) by mouth daily Take one 10 mg tablet and one 20 mg tablet to total 30 mg every evening    Mixed hyperlipidemia       * pravastatin 20 MG tablet    PRAVACHOL    90 tablet    Take 1 tablet (20 mg) by mouth daily Take one 10 mg tablet and one 20 mg tablet to total 30 mg every evening    Mixed hyperlipidemia       Travoprost 0.004 % Soln     5 mL    Apply 1 drop to eye At Bedtime 1 drop to both eyes at bedtime    Primary open angle glaucoma of both eyes, moderate stage       TYLENOL PO           warfarin 5 MG tablet    COUMADIN    135 tablet    Take 5 mg by mouth MWF and 7.5 mg ROW, or as directed per Coumadin clinic.    Long term current use of anticoagulant therapy       ZYRTEC ALLERGY PO      Take  by mouth daily as needed.        * Notice:  This list has 4 medication(s) that are the same as other medications prescribed for you. Read the directions carefully, and ask your doctor or other care provider to review them with you.

## 2017-08-02 NOTE — NURSING NOTE
Chief Complaints and History of Present Illnesses   Patient presents with     Follow Up For     Primary open angle glaucoma of both eyes, moderate stage [H40.11X2]     HPI    Last Eye Exam:  11/16/16   Affected eye(s):  Both   Symptoms:        Unknown duration    Frequency:  Constant       Do you have eye pain now?:  No      Comments:  He is here today for a follow up of Primary open angle glaucoma of both eyes, moderate stage [H40.11X2]  He states no change in vision since last visit.     Miko Grigsby COT 1:16 PM August 2, 2017

## 2017-08-02 NOTE — MR AVS SNAPSHOT
After Visit Summary   8/2/2017    Fredrick Mehta    MRN: [id number]           Patient Information     Date Of Birth          1940        Visit Information        Provider Department      8/2/2017 9:30 AM Yobani Sarkar MD [facility]        Today's Diagnoses     HTN (hypertension)    -  1    Atrial fibrillation (H)          Care Instructions    You were seen today in the Cardiovascular Clinic at the [facility].      Cardiology Providers you saw during your visit:  Dr. Sarkar    Recommendations: 1) Please stop both Taztia and diltiazem 120 mg. 2) Please start Diltiazem 240 mg twice daily by mouth.    Follow-up:  In 1 year.    Thank you for your visit today!       Please call if you have any questions or concerns.  Cardiology Care Coordinator  Yelitza Blake RN    For scheduling needs [phone] option 1 and the option 1 again.  Nursing questions: [phone] option 1 then chose option 3 for the triage nurse.  For emergencies call 911.                  Follow-ups after your visit        Additional Services     INR/ANTICOAG REFERRAL       Indication for Anticoagulation: Atrial Fibrillation  Range:  2.0 - 3.0  If nonstandard INR desired, range desired/explain:   Expected duration of therapy: Lifetime    Please be aware that coverage of these services is subject to the terms and limitations of your health insurance plan.  Call member services at your health plan with any benefit or coverage questions.                  Follow-up notes from your care team     Return in about 1 year (around 8/2/2018), or if symptoms worsen or fail to improve, for Dr. Sarkar, AFib, HLD.      Your next 10 appointments already scheduled     Aug 02, 2017  1:15 PM CDT   VISUAL FIELD with [facility] EYE VISUAL FIELD   Eye Clinic ([facility])    [address]  [address] [facility]  9th Fl Clin 9a  [facility] [address]   [phone]            Aug 02, 2017  1:45 PM CDT   RETURN GLAUCOMA with  "Kim Joel MD   Eye Clinic ([facility])    [address]  [address] [facility]  9th Fl Clin 9a  [facility] [id number] [phone]              Future tests that were ordered for you today     Open Future Orders        Priority Expected Expires Ordered    OVF 24-2 Dynamic OU Routine  2/2/2019 8/2/2017            Who to contact     If you have questions or need follow up information about today's clinic visit or your schedule please contact [facility] directly at [phone].  Normal or non-critical lab and imaging results will be communicated to you by Elepath[employer], letter or phone within 4 business days after the clinic has received the results. If you do not hear from us within 7 days, please contact the clinic through [email or web address] or phone. If you have a critical or abnormal lab result, we will notify you by phone as soon as possible.  Submit refill requests through [facility] or call your pharmacy and they will forward the refill request to us. Please allow 3 business days for your refill to be completed.          Additional Information About Your Visit        [facility] Information     [facility] gives you secure access to your electronic health record. If you see a primary care provider, you can also send messages to your care team and make appointments. If you have questions, please call your primary care clinic.  If you do not have a primary care provider, please call [phone] and they will assist you.        Care EveryWhere ID     This is your Care EveryWhere ID. This could be used by other organizations to access your [facility] medical records  [id number]        Your Vitals Were     Pulse Height Pulse Oximetry BMI (Body Mass Index)          59 1.803 m (5' 11\") 97% 24.1 kg/m2         Blood Pressure from Last 3 Encounters:   08/02/17 111/71   09/13/16 137/65   08/03/16 116/72    Weight from Last 3 Encounters:   08/02/17 78.4 kg (172 lb 12.8 oz)   09/13/16 82.6 kg (182 lb)   08/03/16 82.7 " kg (182 lb 4.8 oz)              We Performed the Following     INR/ANTICOAG REFERRAL          Today's Medication Changes          These changes are accurate as of: 8/2/17 10:01 AM.  If you have any questions, ask your nurse or doctor.               Start taking these medicines.        Dose/Directions    diltiazem 240 MG 24 hr capsule   Commonly known as:  DILACOR XR   Used for:  HTN (hypertension)   Replaces:  diltiazem 120 MG tablet   Started by:  Yobani Sarkar MD        Dose:  240 mg   Take 1 capsule (240 mg) by mouth two times daily   Quantity:  180 capsule   Refills:  3         Stop taking these medicines if you haven't already. Please contact your care team if you have questions.     diltiazem 120 MG tablet   Commonly known as:  CARDIZEM   Replaced by:  diltiazem 240 MG 24 hr capsule   Stopped by:  Yobani Sarkar MD           TAZTIA  MG 24 hr ER beaded capsule   Generic drug:  diltiazem   Stopped by:  Yobani Sarkar MD                Where to get your medicines      These medications were sent to [facility] [address] [place] [address] AT [address] [address]    Hours:  24-hours Phone:  [phone]     diltiazem 240 MG 24 hr capsule                Primary Care Provider Office Phone # Fax #    Pieter Ulloa [phone]       [address] [address]        Equal Access to Services     [facility] AH: Hadii aad ku hadasho Soomaali, waaxda luqadaha, qaybta kaalmada adeegyada, waxay andrew haypreciousn shama walton . So [facility] [phone].    ATENCIÓN: Si habla español, tiene a myers disposición servicios gratuitos de asistencia lingüística. Llame al [phone].    We comply with applicable federal civil rights laws and [place] laws. We do not discriminate on the basis of race, color, national origin, age, disability sex, sexual orientation or gender identity.            Thank you!      Thank you for choosing [facility]  for your care. Our goal is always to provide you with excellent care. Hearing back from our patients is one way we can continue to improve our services. Please take a few minutes to complete the written survey that you may receive in the mail after your visit with us. Thank you!             Your Updated Medication List - Protect others around you: Learn how to safely use, store and throw away your medicines at www.disposemymeds.org.          This list is accurate as of: 8/2/17 10:01 AM.  Always use your most recent med list.                   Brand Name Dispense Instructions for use Diagnosis    ARICEPT 10 MG tablet   Generic drug:  donepezil     30 tablet    Take 1 tablet (10 mg) by mouth At Bedtime    Memory loss       diltiazem 240 MG 24 hr capsule    DILACOR XR    180 capsule    Take 1 capsule (240 mg) by mouth two times daily    HTN (hypertension)       IBUPROFEN PO           levothyroxine 50 MCG tablet    SYNTHROID/LEVOTHROID    90 tablet    Take 1 tablet (50 mcg) by mouth daily    Hypothyroidism, unspecified type       * pravastatin 10 MG tablet    PRAVACHOL    90 tablet    Take 1 tablet (10 mg) by mouth daily Take one 10 mg tablet and one 20 mg tablet to total 30 mg every evening    Mixed hyperlipidemia       * pravastatin 20 MG tablet    PRAVACHOL    90 tablet    Take 1 tablet (20 mg) by mouth daily Take one 10 mg tablet and one 20 mg tablet to total 30 mg every evening    Mixed hyperlipidemia       Travoprost 0.004 % Soln     5 mL    Apply 1 drop to eye At Bedtime 1 drop to both eyes at bedtime    Glaucoma       TYLENOL PO           warfarin 5 MG tablet    COUMADIN    135 tablet    Take 5 mg by mouth MWF and 7.5 mg ROW, or as directed per Coumadin clinic.    Long term current use of anticoagulant therapy       ZYRTEC ALLERGY PO      Take  by mouth daily as needed.        * Notice:  This list has 2 medication(s) that are the same as other medications prescribed  for you. Read the directions carefully, and ask your doctor or other care provider to review them with you.

## 2017-08-03 ENCOUNTER — TELEPHONE (OUTPATIENT)
Dept: CARDIOLOGY | Facility: CLINIC | Age: 77
End: 2017-08-03

## 2017-08-03 NOTE — TELEPHONE ENCOUNTER
Patient's wife calling wanting a call back to discuss the instructions for the Diltiazem.  You can reach her @ [phone]

## 2017-08-03 NOTE — PROGRESS NOTES
CARDIOLOGY CLINIC NOTE      Dear Dr. Ulloa:     It was a pleasure participating in the care of your patient, Mr. Fredrick Mehta.  As you know, he is a 76-year-old, retired  whom I had seen today for paroxysmal atrial fibrillation.    PAST MEDICAL HISTORY:      1.  Hyperlipidemia.  2.  Prostatectomy 2009.  3.  Right femur fracture.  4.  Benign positional vertigo.  5.  Migraine headaches.  6.  Alzheimer's dementia.    History of the present illness:  His cardiac history is significant for paroxysmal atrial fibrillation.  As you recall, he was originally lifting some heavy furniture at home, and after putting an event monitor on, he was found to be in atrial fibrillation with a rapid rate.  He spontaneously converted to normal sinus rhythm and has been managed on Coumadin and diltiazem since that time.    His episodes of AFib have been manifested as palpitations with weakness in the shoulders, and they have commonly occurred in the late hours of the day and could be exacerbated by either large meals or walking outside in the brisk, cold weather.    As you recall, back in 2012, he was forced to walk out in the cold from his car to reach his cardiology appointment, which precipitated an episode of AFib, which spontaneously converted with rest and extra dose of diltiazem.    I last saw him August 3, 2016.  At that time, he was doing well.  He presents today for continuing care.    Interestingly, today I have met his wife for the first time, and she accompanies him due to the fact that the patient has been having memory problems.  In fact, her main question is that of his medications as the patient has been likely accidentally doubling up on one of his medicines.    Interestingly, also, is the fact that since doubling up on his medications, he has not had any further breakthrough episodes of AFib.  He has been doing quite well.  He denies any significant chest pain, shortness of breath, PND, orthopnea,  edema, palpitations, syncope, or near syncope.  He denies black or bloody stools or nose bleeds.  He denies neurologic symptoms.    PRESENT MEDICATIONS:      1.  Taztia  mg twice daily as well as Cardizem 120 twice daily, giving an equivalent of diltiazem  mg twice daily.  2.  Warfarin.  3.  Pravastatin 30 mg a day.  4.  Levothyroxine.    PHYSICAL EXAMINATION:      Vital Signs:  His blood pressure is 111/71, with a pulse of 59.  His weight is 172 pounds.  neck:  Exam reveals no obvious jugular venous distention.  Lungs:  Clear to auscultation.  Respiratory effort is normal.  Cardiac:  Exam reveals a regular rate and rhythm.  No obvious murmur or gallop is appreciated.  abdomen:  His belly is soft, nontender.  Extremities:  Without significant edema.    DIAGNOSTIC STUDIES:  Echocardiogram February 8, 2012, revealed normal LV systolic function with mild MR.  Aortic dimension was 3.8 at that time.    LABS:  May 9, 2017:  Total cholesterol of 201, HDL 62, , triglycerides 161.  September 13, 2016:  Potassium 4.5.  GFR was normal.      IMPRESSION:      Fredrick is a 76-year-old retired  with presumed Alzheimer's dementia, whose cardiac history is significant for paroxysmal atrial fibrillation.  His episodes of atrial fibrillation are typically exacerbated by meals late in the day, or by walking outside in the brisk, cold weather, or by alcohol intake.    Since our last visit, he accidentally doubled up on his diltiazem dose and increased it from 120 mg twice daily to 240 mg twice daily.  Since then, his breakthrough episodes have not recurred, and this accidental change has been successful in controlling his paroxysmal episodes.      PLAN:      1.  We will consolidate his medications to diltiazem  mg twice daily.    2.  Increase pravastatin from 30 to 40 mg a day.    3.  They request an INR clinic closer to home, which we will help facilitate.    4.  Follow up in 1 year or earlier if  needed.    Once again, it was a pleasure participating in the care of your patient, Mr. Fredrick Mehta.  Please feel free to contact me at any time if there are any questions regarding his care in the future.    CC:  Alpesh Ulloa MD, [facility], [address] [place], [place] [address]        Yobani Sarkar MD    D:  08/02/2017 10:50 T:  08/03/2017 07:15  Document:  [id number] RS\SL\CK

## 2017-08-03 NOTE — NURSING NOTE
Chief Complaint   Patient presents with     Follow Up For     AFib, HLD     Cardiology Providers you saw during your visit:  Dr. Sarkar    Recommendations: 1) Please stop both Taztia and diltiazem 120 mg. 2) Please start Diltiazem 240 mg twice daily by mouth.    Follow-up:  In 1 year.    Med Reconcile: Reviewed and verified all current medications with the patient. The updated medication list was printed and given to the patient.  Medication Change: Patient was educated regarding prescribed medication change, including discussion of the indication, administration, side effects, and when to report to MD or RN. Patient demonstrated understanding of this information and agreed to call with further questions or concerns.  Patient stated he understood all health information given and agreed to call with further questions or concerns.

## 2017-08-17 ENCOUNTER — ANTICOAGULATION THERAPY VISIT (OUTPATIENT)
Dept: NURSING | Facility: CLINIC | Age: 77
End: 2017-08-17
Payer: COMMERCIAL

## 2017-08-17 DIAGNOSIS — I48.91 ATRIAL FIBRILLATION (H): ICD-10-CM

## 2017-08-17 DIAGNOSIS — Z79.01 LONG-TERM (CURRENT) USE OF ANTICOAGULANTS: ICD-10-CM

## 2017-08-17 LAB — INR POINT OF CARE: 1.5 (ref 0.86–1.14)

## 2017-08-17 PROCEDURE — 36416 COLLJ CAPILLARY BLOOD SPEC: CPT

## 2017-08-17 PROCEDURE — 85610 PROTHROMBIN TIME: CPT | Mod: QW

## 2017-08-17 PROCEDURE — 99207 ZZC NO CHARGE NURSE ONLY: CPT

## 2017-08-17 NOTE — MR AVS SNAPSHOT
Fredrick Mehta   8/17/2017 8:45 AM   Anticoagulation Therapy Visit    Description:  76 year old male   Provider:   ANTICOAGULATION CLINIC   Department:   Nurse           INR as of 8/17/2017     Today's INR 1.5!      Anticoagulation Summary as of 8/17/2017     INR goal 2.0-2.5   Today's INR 1.5!   Full instructions 8/17: 10 mg; 8/18: 10 mg; 8/20: 7.5 mg; Otherwise 5 mg on Sun, Tue, Thu; 7.5 mg all other days   Next INR check 8/21/2017    Indications   Long-term (current) use of anticoagulants [Z79.01] [Z79.01]  Atrial fibrillation (H) [I48.91]         Your next Anticoagulation Clinic appointment(s)     Aug 21, 2017 11:15 AM CDT   Anticoagulation Visit with  ANTICOAGULATION CLINIC   [facility]    [address]  [place] [address]   [phone]              Contact Numbers     Clinic Number:         August 2017 Details    Sun Mon Tue Wed Thu Fri Sat       1               2               3               4               5                 6               7               8               9               10               11               12                 13               14               15               16               17      10 mg   See details      18      10 mg         19      7.5 mg           20      7.5 mg         21            22               23               24               25               26                 27               28               29               30               31                  Date Details   08/17 This INR check       Date of next INR:  8/21/2017         How to take your warfarin dose     To take:  7.5 mg Take 1.5 of the 5 mg tablets.    To take:  10 mg Take 2 of the 5 mg tablets.

## 2017-08-17 NOTE — PROGRESS NOTES
ANTICOAGULATION FOLLOW-UP CLINIC VISIT    Patient Name:  Fredrick Mehta  Date:  8/17/2017  Contact Type:  Face to Face    SUBJECTIVE:        OBJECTIVE    INR Protime   Date Value Ref Range Status   08/17/2017 1.5 (A) 0.86 - 1.14 Final       ASSESSMENT / PLAN  INR assessment SUB    Recheck INR In: 4 DAYS    INR Location Clinic      Anticoagulation Summary as of 8/17/2017     INR goal 2.0-2.5   Today's INR 1.5!   Maintenance plan 5 mg (5 mg x 1) on Sun, Tue, Thu; 7.5 mg (5 mg x 1.5) all other days   Full instructions 8/17: 10 mg; 8/18: 10 mg; 8/20: 7.5 mg; Otherwise 5 mg on Sun, Tue, Thu; 7.5 mg all other days   Weekly total 45 mg   Plan last modified Dana Matthews RN (8/2/2017)   Next INR check 8/21/2017   Priority INR   Target end date     Indications   Long-term (current) use of anticoagulants [Z79.01] [Z79.01]  Atrial fibrillation (H) [I48.91]         Anticoagulation Episode Summary     INR check location     Preferred lab     Send INR reminders to [facility]    Comments ++New Goal Range 8/3/16  Dr Sarkar  2.0 to 2.5++ Contact at [phone]. Labs in [place].      Anticoagulation Care Providers     Provider Role Specialty Phone number    Mello, Yobani Paniagua MD [facility] [phone]            See the Encounter Report to view Anticoagulation Flowsheet and Dosing Calendar (Go to Encounters tab in chart review, and find the Anticoagulation Therapy Visit)    Dosage adjustment made based on physician directed care plan.    Ashley Kinney, TEMO

## 2017-08-21 ENCOUNTER — ANTICOAGULATION THERAPY VISIT (OUTPATIENT)
Dept: ANTICOAGULATION | Facility: CLINIC | Age: 77
End: 2017-08-21

## 2017-08-21 ENCOUNTER — ANTICOAGULATION THERAPY VISIT (OUTPATIENT)
Dept: NURSING | Facility: CLINIC | Age: 77
End: 2017-08-21
Payer: COMMERCIAL

## 2017-08-21 DIAGNOSIS — Z79.01 LONG-TERM (CURRENT) USE OF ANTICOAGULANTS: ICD-10-CM

## 2017-08-21 DIAGNOSIS — I48.91 ATRIAL FIBRILLATION, UNSPECIFIED TYPE (H): ICD-10-CM

## 2017-08-21 DIAGNOSIS — I48.91 ATRIAL FIBRILLATION (H): ICD-10-CM

## 2017-08-21 LAB — INR POINT OF CARE: 2.5 (ref 0.86–1.14)

## 2017-08-21 PROCEDURE — 85610 PROTHROMBIN TIME: CPT | Mod: QW

## 2017-08-21 PROCEDURE — 99207 ZZC NO CHARGE NURSE ONLY: CPT

## 2017-08-21 PROCEDURE — 36416 COLLJ CAPILLARY BLOOD SPEC: CPT

## 2017-08-21 NOTE — MR AVS SNAPSHOT
Fredrick Mehta   8/21/2017   Anticoagulation Therapy Visit    Description:  76 year old male   Provider:  Danielle Block, RN   Department:  [facility]           INR as of 8/21/2017     Today's INR No new INR was available at the time of this encounter.      Anticoagulation Summary as of 8/21/2017     INR goal 2.0-2.5   Today's INR No new INR was available at the time of this encounter.   Full instructions 5 mg on Sun, Tue, Thu; 7.5 mg all other days   Next INR check     Indications   Long-term (current) use of anticoagulants [Z79.01] [Z79.01]  Atrial fibrillation (H) [I48.91]         Anticoagulation Episode Summary     Resolved date 8/21/2017    Resolved reason Outside MD      Your next Anticoagulation Clinic appointment(s)     Aug 29, 2017  3:00 PM CDT   Anticoagulation Visit with  ANTICOAGULATION CLINIC   [facility]    [address]  [place] [address]   [phone]

## 2017-08-21 NOTE — PROGRESS NOTES
ANTICOAGULATION FOLLOW-UP CLINIC VISIT    Patient Name:  Fredrick Mehta  Date:  8/21/2017  Contact Type:  Face to Face    SUBJECTIVE:     Patient Findings     Positives No Problem Findings           OBJECTIVE    INR Protime   Date Value Ref Range Status   08/21/2017 2.5 (A) 0.86 - 1.14 Final       ASSESSMENT / PLAN  INR assessment THER    Recheck INR In: 1 WEEK    INR Location Clinic      Anticoagulation Summary as of 8/21/2017     INR goal 2.0-2.5   Today's INR 2.5   Maintenance plan 5 mg (5 mg x 1) on Sun, Tue, Thu; 7.5 mg (5 mg x 1.5) all other days   Full instructions 5 mg on Sun, Tue, Thu; 7.5 mg all other days   Weekly total 45 mg   No change documented Jessica Coker RN   Plan last modified Dana Matthews RN (8/2/2017)   Next INR check 8/28/2017   Priority INR   Target end date     Indications   Long-term (current) use of anticoagulants [Z79.01] [Z79.01]  Atrial fibrillation (H) [I48.91]         Anticoagulation Episode Summary     INR check location     Preferred lab     Send INR reminders to [facility]    Comments ++New Goal Range 8/3/16  Dr Sarkar  2.0 to 2.5++ Contact at [phone]. Labs in [place].      Anticoagulation Care Providers     Provider Role Specialty Phone number    Yobani Sarkar MD Referring Cardiology [phone]    Pieter Ulloa MD [facility] [phone]            See the Encounter Report to view Anticoagulation Flowsheet and Dosing Calendar (Go to Encounters tab in chart review, and find the Anticoagulation Therapy Visit)    Dosage adjustment made based on physician directed care plan.    Jessica Coker, RN

## 2017-08-21 NOTE — MR AVS SNAPSHOT
Fredrick Mehta   8/21/2017 11:15 AM   Anticoagulation Therapy Visit    Description:  76 year old male   Provider:   ANTICOAGULATION CLINIC   Department:   Nurse           INR as of 8/21/2017     Today's INR 2.5      Anticoagulation Summary as of 8/21/2017     INR goal 2.0-2.5   Today's INR 2.5   Full instructions 5 mg on Sun, Tue, Thu; 7.5 mg all other days   Next INR check 8/28/2017    Indications   Long-term (current) use of anticoagulants [Z79.01] [Z79.01]  Atrial fibrillation (H) [I48.91]         Your next Anticoagulation Clinic appointment(s)     Aug 28, 2017  1:45 PM CDT   Anticoagulation Visit with  ANTICOAGULATION CLINIC   [facility] [place] ([facility])    [address]  [place] [address]   [phone]              Contact Numbers     Clinic Number:         August 2017 Details    Sun Mon Tue Wed Thu Fri Sat       1               2               3               4               5                 6               7               8               9               10               11               12                 13               14               15               16               17               18               19                 20               21      7.5 mg   See details      22      5 mg         23      7.5 mg         24      5 mg         25      7.5 mg         26      7.5 mg           27      5 mg         28            29               30               31                  Date Details   08/21 This INR check       Date of next INR:  8/28/2017         How to take your warfarin dose     To take:  5 mg Take 1 of the 5 mg tablets.    To take:  7.5 mg Take 1.5 of the 5 mg tablets.

## 2017-08-29 ENCOUNTER — ANTICOAGULATION THERAPY VISIT (OUTPATIENT)
Dept: NURSING | Facility: CLINIC | Age: 77
End: 2017-08-29
Payer: COMMERCIAL

## 2017-08-29 ENCOUNTER — TELEPHONE (OUTPATIENT)
Dept: FAMILY MEDICINE | Facility: CLINIC | Age: 77
End: 2017-08-29

## 2017-08-29 DIAGNOSIS — I48.91 ATRIAL FIBRILLATION, UNSPECIFIED TYPE (H): ICD-10-CM

## 2017-08-29 DIAGNOSIS — Z79.01 LONG-TERM (CURRENT) USE OF ANTICOAGULANTS: ICD-10-CM

## 2017-08-29 LAB — INR POINT OF CARE: 4.1 (ref 0.86–1.14)

## 2017-08-29 PROCEDURE — 99207 ZZC NO CHARGE NURSE ONLY: CPT

## 2017-08-29 PROCEDURE — 36416 COLLJ CAPILLARY BLOOD SPEC: CPT

## 2017-08-29 PROCEDURE — 85610 PROTHROMBIN TIME: CPT | Mod: QW

## 2017-08-29 NOTE — TELEPHONE ENCOUNTER
Dr. Sarkar,     I met with patient today in INR Clinic here at [facility]. I see that you placed an INR Referral on 8/2/17. I need clarification from you regarding what the patient's INR range should be.   I see that the INR Referral place August 2016 had a range of 2.0-2.5 and the INR Referral placed August 2017 has a range of 2.0-3.0.    Please clarify if range should be 2.0-2.5 or 2.0-3.0.     Please route message back to [facility] Anticoagulation pool.      Thank you!      Racehl Pretty, RN  INR RN   [facility]

## 2017-08-29 NOTE — PROGRESS NOTES
ANTICOAGULATION FOLLOW-UP CLINIC VISIT    Patient Name:  Fredrick Mehta  Date:  8/29/2017  Contact Type:  Face to Face    SUBJECTIVE:     Patient Findings     Comments Patient has taken coumadin for a long time. Recently was being followed by [facility]. INR referral placed by Dr. Sarkar (cardiology) 8/2/17. Patient has alzheimer's so wife comes to appointments with him. INR is SUPRA today. Patient normally takes coumadin in AM and has already taken dose today. Will have patient hold his coumadin tomorrow. 5 mg Thursday and recheck on Friday.     I need clarification on the patient's range. Previously his range has been 2.0-2.5. However, the most recent INR Referral placed by Dr. Sarkar states that his INR range should be 2.0-3.0. Sent message to Dr. Sarkar to clarify this.            OBJECTIVE    INR Protime   Date Value Ref Range Status   08/29/2017 4.1 (A) 0.86 - 1.14 Final       ASSESSMENT / PLAN  INR assessment SUPRA    Recheck INR In: 3 DAYS    INR Location Clinic      Anticoagulation Summary as of 8/29/2017     INR goal 2.0-2.5   Today's INR 4.1!   Maintenance plan 5 mg (5 mg x 1) on Sun, Tue, Thu; 7.5 mg (5 mg x 1.5) all other days   Full instructions 8/30: Hold; Otherwise 5 mg on Sun, Tue, Thu; 7.5 mg all other days   Weekly total 45 mg   Plan last modified Rachel Pretty, RN (8/29/2017)   Next INR check 9/1/2017   Target end date     Indications   Atrial fibrillation (H) [I48.91]  Long-term (current) use of anticoagulants [Z79.01] [Z79.01]         Anticoagulation Episode Summary     INR check location     Preferred lab     Send INR reminders to [facility]    Comments       Anticoagulation Care Providers     Provider Role Specialty Phone number    oYbani Sarkar MD Referring Cardiology [phone]            See the Encounter Report to view Anticoagulation Flowsheet and Dosing Calendar (Go to Encounters tab in chart review, and find the Anticoagulation Therapy Visit)    Dosage  adjustment made based on physician directed care plan.  Patient has taken coumadin for a long time. Recently was being followed by [facility]. INR referral placed by Dr. Sarkar (cardiology) 8/2/17. Patient has alzheimer's so wife comes to appointments with him. INR is SUPRA today. Patient normally takes coumadin in AM and has already taken dose today. Will have patient hold his coumadin tomorrow. 5 mg Thursday and recheck on Friday.     I need clarification on the patient's range. Previously his range has been 2.0-2.5. However, the most recent INR Referral placed by Dr. Sarkar states that his INR range should be 2.0-3.0. Sent message to Dr. Sarkar to clarify this.    Rachel Pretty RN

## 2017-08-29 NOTE — MR AVS SNAPSHOT
Fredrick Mehta   8/29/2017 3:00 PM   Anticoagulation Therapy Visit    Description:  76 year old male   Provider:   ANTICOAGULATION CLINIC   Department:  [facility]           INR as of 8/29/2017     Today's INR 4.1!      Anticoagulation Summary as of 8/29/2017     INR goal 2.0-2.5   Today's INR 4.1!   Full instructions 8/30: Hold; Otherwise 5 mg on Sun, Tue, Thu; 7.5 mg all other days   Next INR check 9/1/2017    Indications   Atrial fibrillation (H) [I48.91]  Long-term (current) use of anticoagulants [Z79.01] [Z79.01]         Your next Anticoagulation Clinic appointment(s)     Sep 01, 2017  3:30 PM CDT   Anticoagulation Visit with  ANTICOAGULATION CLINIC   [facility] [place] ([facility])    [address]  [place] [address]   [phone]              Contact Numbers     Clinic Number:         August 2017 Details    Sun Mon Tue Wed Thu Fri Sat       1               2               3               4               5                 6               7               8               9               10               11               12                 13               14               15               16               17               18               19                 20               21               22               23               24               25               26                 27               28               29      5 mg   See details      30      Hold         31      5 mg            Date Details   08/29 This INR check               How to take your warfarin dose     To take:  5 mg Take 1 of the 5 mg tablets.    Hold Do not take your warfarin dose. See the Details table to the right for additional instructions.                September 2017 Details    Sun Mon Tue Wed Thu Fri Sat          1            2                 3               4               5               6               7               8               9                 10               11               12               13                14               15               16                 17               18               19               20               21               22               23                 24               25               26               27               28               29               30                Date Details   No additional details    Date of next INR:  9/1/2017         How to take your warfarin dose     To take:  7.5 mg Take 1.5 of the 5 mg tablets.

## 2017-08-30 NOTE — PROGRESS NOTES
Got return message from Dr. Sarkar (see telephone encounter). He would like INR range to be 2.0-2.5 for patient.     Rachel Pretty RN

## 2017-08-30 NOTE — TELEPHONE ENCOUNTER
INR range should be 2.0-2.5 per note below. Updated episode for Anticoagulation.     Yobani Sarkar MD Astry, Kelly, RN        Caller: Unspecified (Yesterday,  3:51 PM)                     Lexa Ramos,     Kieran for your note.     An INR goal 2.0-2.5 would be great.     Thanks!       Rachel Pretty RN

## 2017-09-01 ENCOUNTER — ANTICOAGULATION THERAPY VISIT (OUTPATIENT)
Dept: NURSING | Facility: CLINIC | Age: 77
End: 2017-09-01
Payer: COMMERCIAL

## 2017-09-01 DIAGNOSIS — I48.91 ATRIAL FIBRILLATION (H): ICD-10-CM

## 2017-09-01 DIAGNOSIS — Z79.01 LONG-TERM (CURRENT) USE OF ANTICOAGULANTS: ICD-10-CM

## 2017-09-01 LAB — INR POINT OF CARE: 2 (ref 0.86–1.14)

## 2017-09-01 PROCEDURE — 36416 COLLJ CAPILLARY BLOOD SPEC: CPT

## 2017-09-01 PROCEDURE — 99207 ZZC NO CHARGE NURSE ONLY: CPT

## 2017-09-01 PROCEDURE — 85610 PROTHROMBIN TIME: CPT | Mod: QW

## 2017-09-01 NOTE — MR AVS SNAPSHOT
Fredrick Mehta   9/1/2017 3:30 PM   Anticoagulation Therapy Visit    Description:  76 year old male   Provider:   ANTICOAGULATION CLINIC   Department:   Nurse           INR as of 9/1/2017     Today's INR 2.0      Anticoagulation Summary as of 9/1/2017     INR goal 2.0-2.5   Today's INR 2.0   Full instructions 7.5 mg on Mon, Wed, Fri; 5 mg all other days   Next INR check     Indications   Atrial fibrillation (H) [I48.91]  Long-term (current) use of anticoagulants [Z79.01] [Z79.01]         Your next Anticoagulation Clinic appointment(s)     Sep 08, 2017  2:30 PM CDT   Anticoagulation Visit with  ANTICOAGULATION CLINIC   [facility]    [address]  [place] [address]   [phone]              Contact Numbers     Clinic Number:         September 2017 Details    Sun Mon Tue Wed Thu Fri Sat          1      7.5 mg   See details      2      5 mg           3      5 mg         4      7.5 mg         5      5 mg         6      7.5 mg         7      5 mg         8      7.5 mg         9      5 mg           10      5 mg         11      7.5 mg         12      5 mg         13      7.5 mg         14      5 mg         15      7.5 mg         16      5 mg           17      5 mg         18      7.5 mg         19      5 mg         20      7.5 mg         21      5 mg         22      7.5 mg         23      5 mg           24      5 mg         25      7.5 mg         26      5 mg         27      7.5 mg         28      5 mg         29      7.5 mg         30      5 mg          Date Details   09/01 This INR check      Date of next INR: No date specified         How to take your warfarin dose     To take:  5 mg Take 1 of the 5 mg tablets.    To take:  7.5 mg Take 1.5 of the 5 mg tablets.

## 2017-09-01 NOTE — PROGRESS NOTES
ANTICOAGULATION FOLLOW-UP CLINIC VISIT    Patient Name:  Fredrick Mehta  Date:  9/1/2017  Contact Type:  Face to Face    SUBJECTIVE:     Patient Findings     Positives Intentional hold of therapy    Comments Recent hold X 1 due to supra           OBJECTIVE    INR Protime   Date Value Ref Range Status   09/01/2017 2.0 (A) 0.86 - 1.14 Final       ASSESSMENT / PLAN  INR assessment THER    Recheck INR In: 1 WEEK    INR Location Clinic      Anticoagulation Summary as of 9/1/2017     INR goal 2.0-2.5   Today's INR 2.0   Maintenance plan 7.5 mg (5 mg x 1.5) on Mon, Wed, Fri; 5 mg (5 mg x 1) all other days   Full instructions 7.5 mg on Mon, Wed, Fri; 5 mg all other days   Weekly total 42.5 mg   Plan last modified Shwetha Escobar RN (9/1/2017)   Next INR check 9/8/2017   Target end date     Indications   Atrial fibrillation (H) [I48.91]  Long-term (current) use of anticoagulants [Z79.01] [Z79.01]         Anticoagulation Episode Summary     INR check location [facility]    Preferred lab     Send INR reminders to  ANTICOAGULATION    Comments       Anticoagulation Care Providers     Provider Role Specialty Phone number    [facility], Yobani Paniagua MD Referring Cardiology [phone]            See the Encounter Report to view Anticoagulation Flowsheet and Dosing Calendar (Go to Encounters tab in chart review, and find the Anticoagulation Therapy Visit)    Dosage adjustment made based on physician directed care plan. Fairly new to [facility]; patient at [facility], Dr.Jon Ulloa.   INR recently high; intentional hold. He was SUPRA at 45 mg/week.   Today, 2.0 after 37.5 mg/week .  Slight weekly reduction (5%) from his maintenance plan (42.5 mg/week) Recheck one week.    Shwetha Escobar RN

## 2017-09-08 ENCOUNTER — ANTICOAGULATION THERAPY VISIT (OUTPATIENT)
Dept: NURSING | Facility: CLINIC | Age: 77
End: 2017-09-08
Payer: COMMERCIAL

## 2017-09-08 DIAGNOSIS — I48.91 ATRIAL FIBRILLATION (H): ICD-10-CM

## 2017-09-08 DIAGNOSIS — Z79.01 LONG-TERM (CURRENT) USE OF ANTICOAGULANTS: ICD-10-CM

## 2017-09-08 LAB — INR POINT OF CARE: 2.2 (ref 0.86–1.14)

## 2017-09-08 PROCEDURE — 99207 ZZC NO CHARGE NURSE ONLY: CPT

## 2017-09-08 PROCEDURE — 36416 COLLJ CAPILLARY BLOOD SPEC: CPT

## 2017-09-08 PROCEDURE — 85610 PROTHROMBIN TIME: CPT | Mod: QW

## 2017-09-08 NOTE — MR AVS SNAPSHOT
Fredrick Mehta   9/8/2017 2:30 PM   Anticoagulation Therapy Visit    Description:  76 year old male   Provider:   ANTICOAGULATION CLINIC   Department:   Nurse           INR as of 9/8/2017     Today's INR 2.2      Anticoagulation Summary as of 9/8/2017     INR goal 2.0-2.5   Today's INR 2.2   Full instructions 7.5 mg on Mon, Wed, Fri; 5 mg all other days   Next INR check 9/19/2017    Indications   Atrial fibrillation (H) [I48.91]  Long-term (current) use of anticoagulants [Z79.01] [Z79.01]         Your next Anticoagulation Clinic appointment(s)     Sep 19, 2017  3:00 PM CDT   Anticoagulation Visit with  ANTICOAGULATION CLINIC   [facility]    [address]  [place] [address]   [phone]              Contact Numbers     Clinic Number:         September 2017 Details    Sun Mon Tue Wed Thu Fri Sat          1               2                 3               4               5               6               7               8      7.5 mg   See details      9      5 mg           10      5 mg         11      7.5 mg         12      5 mg         13      7.5 mg         14      5 mg         15      7.5 mg         16      5 mg           17      5 mg         18      7.5 mg         19            20               21               22               23                 24               25               26               27               28               29               30                Date Details   09/08 This INR check       Date of next INR:  9/19/2017         How to take your warfarin dose     To take:  5 mg Take 1 of the 5 mg tablets.    To take:  7.5 mg Take 1.5 of the 5 mg tablets.

## 2017-09-08 NOTE — PROGRESS NOTES
ANTICOAGULATION FOLLOW-UP CLINIC VISIT    Patient Name:  Fredrick Mehta  Date:  9/8/2017  Contact Type:  Face to Face    SUBJECTIVE:     Patient Findings     Positives Med error    Comments Pt made medication error, on weekend by double dosing but since was taught about 7 day rolling schedule, compensated dosing for the next two days.  Came in wnl today           OBJECTIVE    INR Protime   Date Value Ref Range Status   09/08/2017 2.2 (A) 0.86 - 1.14 Final       ASSESSMENT / PLAN  No question data found.  Anticoagulation Summary as of 9/8/2017     INR goal 2.0-2.5   Today's INR 2.2   Maintenance plan 7.5 mg (5 mg x 1.5) on Mon, Wed, Fri; 5 mg (5 mg x 1) all other days   Full instructions 7.5 mg on Mon, Wed, Fri; 5 mg all other days   Weekly total 42.5 mg   No change documented Shalini Daniels RN   Plan last modified Shwetha Escobar RN (9/1/2017)   Next INR check 9/19/2017   Target end date     Indications   Atrial fibrillation (H) [I48.91]  Long-term (current) use of anticoagulants [Z79.01] [Z79.01]         Anticoagulation Episode Summary     INR check location [facility]    Preferred lab     Send INR reminders to [facility]    Comments       Anticoagulation Care Providers     Provider Role Specialty Phone number    Si, Yobani Paniagua MD Referring Cardiology [phone]            See the Encounter Report to view Anticoagulation Flowsheet and Dosing Calendar (Go to Encounters tab in chart review, and find the Anticoagulation Therapy Visit)        Shalini Daniels RN

## 2017-09-19 ENCOUNTER — ANTICOAGULATION THERAPY VISIT (OUTPATIENT)
Dept: NURSING | Facility: CLINIC | Age: 77
End: 2017-09-19
Payer: COMMERCIAL

## 2017-09-19 DIAGNOSIS — Z79.01 LONG-TERM (CURRENT) USE OF ANTICOAGULANTS: ICD-10-CM

## 2017-09-19 DIAGNOSIS — I48.0 PAROXYSMAL ATRIAL FIBRILLATION (H): ICD-10-CM

## 2017-09-19 LAB — INR POINT OF CARE: 1.7 (ref 0.86–1.14)

## 2017-09-19 PROCEDURE — 85610 PROTHROMBIN TIME: CPT | Mod: QW

## 2017-09-19 PROCEDURE — 99207 ZZC NO CHARGE NURSE ONLY: CPT

## 2017-09-19 PROCEDURE — 36416 COLLJ CAPILLARY BLOOD SPEC: CPT

## 2017-09-19 NOTE — PROGRESS NOTES
ANTICOAGULATION FOLLOW-UP CLINIC VISIT    Patient Name:  Fredrick Mehta  Date:  9/19/2017  Contact Type:  Face to Face    SUBJECTIVE:     Patient Findings     Positives No Problem Findings           OBJECTIVE    INR Protime   Date Value Ref Range Status   09/19/2017 1.7 (A) 0.86 - 1.14 Final       ASSESSMENT / PLAN  No question data found.  Anticoagulation Summary as of 9/19/2017     INR goal 2.0-2.5   Today's INR 1.7!   Maintenance plan 5 mg (5 mg x 1) on Sun, Tue, Thu; 7.5 mg (5 mg x 1.5) all other days   Full instructions 9/19: 7.5 mg; Otherwise 5 mg on Sun, Tue, Thu; 7.5 mg all other days   Weekly total 45 mg   Plan last modified Tiffanie Salomon RN (9/19/2017)   Next INR check 9/29/2017   Target end date     Indications   Atrial fibrillation (H) [I48.91]  Long-term (current) use of anticoagulants [Z79.01] [Z79.01]         Anticoagulation Episode Summary     INR check location [facility]    Preferred lab     Send INR reminders to  ANTICOAGULATION    Comments       Anticoagulation Care Providers     Provider Role Specialty Phone number    [facility] Referring [facility] [phone]            See the Encounter Report to view Anticoagulation Flowsheet and Dosing Calendar (Go to Encounters tab in chart review, and find the Anticoagulation Therapy Visit)    Patient is here with his wife.  Denies any changes or missed coumadin dosing. Dosing based on [facility] Protocol and Provider directed care plan.      Tiffanie Salomon RN

## 2017-09-19 NOTE — MR AVS SNAPSHOT
Fredrick Mehta   9/19/2017 3:00 PM   Anticoagulation Therapy Visit    Description:  76 year old male   Provider:   ANTICOAGULATION CLINIC   Department:   Nurse           INR as of 9/19/2017     Today's INR 1.7!      Anticoagulation Summary as of 9/19/2017     INR goal 2.0-2.5   Today's INR 1.7!   Full instructions 9/19: 7.5 mg; Otherwise 5 mg on Sun, Tue, Thu; 7.5 mg all other days   Next INR check 9/29/2017    Indications   Atrial fibrillation (H) [I48.91]  Long-term (current) use of anticoagulants [Z79.01] [Z79.01]         Your next Anticoagulation Clinic appointment(s)     Sep 29, 2017  3:00 PM CDT   Anticoagulation Visit with  ANTICOAGULATION CLINIC   [facility] [place] ([facility])    [address]  [place] [address]   [phone]              Contact Numbers     Clinic Number:         September 2017 Details    Sun Mon Tue Wed Thu Fri Sat          1               2                 3               4               5               6               7               8               9                 10               11               12               13               14               15               16                 17               18               19      7.5 mg   See details      20      7.5 mg         21      5 mg         22      7.5 mg         23      7.5 mg           24      5 mg         25      7.5 mg         26      5 mg         27      7.5 mg         28      5 mg         29            30                Date Details   09/19 This INR check       Date of next INR:  9/29/2017         How to take your warfarin dose     To take:  5 mg Take 1 of the 5 mg tablets.    To take:  7.5 mg Take 1.5 of the 5 mg tablets.

## 2017-09-29 ENCOUNTER — TELEPHONE (OUTPATIENT)
Dept: FAMILY MEDICINE | Facility: CLINIC | Age: 77
End: 2017-09-29

## 2017-09-29 ENCOUNTER — ANTICOAGULATION THERAPY VISIT (OUTPATIENT)
Dept: NURSING | Facility: CLINIC | Age: 77
End: 2017-09-29
Payer: COMMERCIAL

## 2017-09-29 DIAGNOSIS — I48.91 ATRIAL FIBRILLATION (H): ICD-10-CM

## 2017-09-29 DIAGNOSIS — Z79.01 LONG-TERM (CURRENT) USE OF ANTICOAGULANTS: ICD-10-CM

## 2017-09-29 LAB — INR POINT OF CARE: 2.5 (ref 0.86–1.14)

## 2017-09-29 PROCEDURE — 99207 ZZC NO CHARGE NURSE ONLY: CPT

## 2017-09-29 PROCEDURE — 36416 COLLJ CAPILLARY BLOOD SPEC: CPT

## 2017-09-29 PROCEDURE — 85610 PROTHROMBIN TIME: CPT | Mod: QW

## 2017-09-29 NOTE — PROGRESS NOTES
ANTICOAGULATION FOLLOW-UP CLINIC VISIT    Patient Name:  Fredrick Mehta  Date:  9/29/2017  Contact Type:  Face to Face    SUBJECTIVE:     Patient Findings     Positives No Problem Findings           OBJECTIVE    INR Protime   Date Value Ref Range Status   09/29/2017 2.5 (A) 0.86 - 1.14 Final       ASSESSMENT / PLAN  INR assessment THER    Recheck INR In: 3 WEEKS    INR Location Clinic      Anticoagulation Summary as of 9/29/2017     INR goal 2.0-2.5   Today's INR 2.5   Maintenance plan 5 mg (5 mg x 1) on Sun, Tue, Thu; 7.5 mg (5 mg x 1.5) all other days   Full instructions 5 mg on Sun, Tue, Thu; 7.5 mg all other days   Weekly total 45 mg   Plan last modified Tiffanie Salomon RN (9/19/2017)   Next INR check    Target end date     Indications   Atrial fibrillation (H) [I48.91]  Long-term (current) use of anticoagulants [Z79.01] [Z79.01]         Anticoagulation Episode Summary     INR check location [facility]    Preferred lab     Send INR reminders to [facility]    Comments       Anticoagulation Care Providers     Provider Role Specialty Phone number    [facility], Yobani Paniagua MD Referring Cardiology [phone]            See the Encounter Report to view Anticoagulation Flowsheet and Dosing Calendar (Go to Encounters tab in chart review, and find the Anticoagulation Therapy Visit)    Dosage adjustment made based on physician directed care plan. INR 2.5 after recent weekly increase. Continue same and recheck in 12 days.      Shwetha Escobar RN

## 2017-09-29 NOTE — TELEPHONE ENCOUNTER
Pt's wife is calling saying they have forms to be filled out for [employer].  I explained to the wife if they could fill out their part and mail the form  to us we can work on them and have them ready at their 10- appointment.  Also wife is concerned her husbands activity level has decreased and she is concerned he is depressed.  Wondering if there is any testing to be done to check for depression.  I will pass this message on to Dr. Ulloa and let him know about this conversation with this patient's wife.

## 2017-09-29 NOTE — MR AVS SNAPSHOT
Fredrick Mehta   9/29/2017 3:00 PM   Anticoagulation Therapy Visit    Description:  76 year old male   Provider:   ANTICOAGULATION CLINIC   Department:   Nurse           INR as of 9/29/2017     Today's INR 2.5      Anticoagulation Summary as of 9/29/2017     INR goal 2.0-2.5   Today's INR 2.5   Full instructions 5 mg on Sun, Tue, Thu; 7.5 mg all other days   Next INR check 10/12/2017    Indications   Atrial fibrillation (H) [I48.91]  Long-term (current) use of anticoagulants [Z79.01] [Z79.01]         Your next Anticoagulation Clinic appointment(s)     Oct 12, 2017  3:00 PM CDT   Anticoagulation Visit with  ANTICOAGULATION CLINIC   [facility]    [address]  [place] [address]   [phone]              Contact Numbers     Clinic Number:         September 2017 Details    Sun Mon Tue Wed Thu Fri Sat          1               2                 3               4               5               6               7               8               9                 10               11               12               13               14               15               16                 17               18               19               20               21               22               23                 24               25               26               27               28               29      7.5 mg   See details      30      7.5 mg          Date Details   09/29 This INR check               How to take your warfarin dose     To take:  7.5 mg Take 1.5 of the 5 mg tablets.           October 2017 Details    Sun Mon Tue Wed Thu Fri Sat     1      5 mg         2      7.5 mg         3      5 mg         4      7.5 mg         5      5 mg         6      7.5 mg         7      7.5 mg           8      5 mg         9      7.5 mg         10      5 mg         11      7.5 mg         12            13               14                 15               16               17               18                19               20               21                 22               23               24               25               26               27               28                 29               30               31                    Date Details   No additional details    Date of next INR:  10/12/2017         How to take your warfarin dose     To take:  5 mg Take 1 of the 5 mg tablets.    To take:  7.5 mg Take 1.5 of the 5 mg tablets.

## 2017-10-05 DIAGNOSIS — Z79.01 LONG TERM CURRENT USE OF ANTICOAGULANT THERAPY: ICD-10-CM

## 2017-10-05 RX ORDER — WARFARIN SODIUM 5 MG/1
TABLET ORAL
Qty: 135 TABLET | Refills: 0 | Status: SHIPPED | OUTPATIENT
Start: 2017-10-05 | End: 2017-12-21

## 2017-10-12 ENCOUNTER — ANTICOAGULATION THERAPY VISIT (OUTPATIENT)
Dept: NURSING | Facility: CLINIC | Age: 77
End: 2017-10-12
Payer: COMMERCIAL

## 2017-10-12 DIAGNOSIS — Z79.01 LONG-TERM (CURRENT) USE OF ANTICOAGULANTS: ICD-10-CM

## 2017-10-12 DIAGNOSIS — I48.91 ATRIAL FIBRILLATION (H): ICD-10-CM

## 2017-10-12 LAB — INR POINT OF CARE: 1.9 (ref 0.86–1.14)

## 2017-10-12 PROCEDURE — 99207 ZZC NO CHARGE NURSE ONLY: CPT

## 2017-10-12 PROCEDURE — 85610 PROTHROMBIN TIME: CPT | Mod: QW

## 2017-10-12 PROCEDURE — 36416 COLLJ CAPILLARY BLOOD SPEC: CPT

## 2017-10-12 NOTE — MR AVS SNAPSHOT
Fredrick Mehta   10/12/2017 3:00 PM   Anticoagulation Therapy Visit    Description:  76 year old male   Provider:   ANTICOAGULATION CLINIC   Department:   Nurse           INR as of 10/12/2017     Today's INR 1.9!      Anticoagulation Summary as of 10/12/2017     INR goal 2.0-2.5   Today's INR 1.9!   Full instructions 10/15: 7.5 mg; Otherwise 5 mg on Sun, Tue, Thu; 7.5 mg all other days   Next INR check 10/20/2017    Indications   Atrial fibrillation (H) [I48.91]  Long-term (current) use of anticoagulants [Z79.01] [Z79.01]         Your next Anticoagulation Clinic appointment(s)     Oct 20, 2017  2:30 PM CDT   Anticoagulation Visit with  ANTICOAGULATION CLINIC   [facility] [place] ([facility])    [address]  [place] [address]   [phone]              Contact Numbers     Clinic Number:         October 2017 Details    Sun Mon Tue Wed Thu Fri Sat     1               2               3               4               5               6               7                 8               9               10               11               12      5 mg   See details      13      7.5 mg         14      7.5 mg           15      7.5 mg         16      7.5 mg         17      5 mg         18      7.5 mg         19      5 mg         20            21                 22               23               24               25               26               27               28                 29               30               31                    Date Details   10/12 This INR check       Date of next INR:  10/20/2017         How to take your warfarin dose     To take:  5 mg Take 1 of the 5 mg tablets.    To take:  7.5 mg Take 1.5 of the 5 mg tablets.

## 2017-10-12 NOTE — PROGRESS NOTES
ANTICOAGULATION FOLLOW-UP CLINIC VISIT    Patient Name:  Fredrick Mehta  Date:  10/12/2017  Contact Type:  Face to Face. Will have patient take 5 mg TuesThurs; 7.5 mg AOD. Recheck in 1 week. Has tight range of 2.0-2.5.     SUBJECTIVE:     Patient Findings     Positives No Problem Findings           OBJECTIVE    INR Protime   Date Value Ref Range Status   10/12/2017 1.9 (A) 0.86 - 1.14 Final       ASSESSMENT / PLAN  INR assessment SUB    Recheck INR In: 1 WEEK    INR Location Clinic      Anticoagulation Summary as of 10/12/2017     INR goal 2.0-2.5   Today's INR 1.9!   Maintenance plan 5 mg (5 mg x 1) on Sun, Tue, Thu; 7.5 mg (5 mg x 1.5) all other days   Full instructions 10/15: 7.5 mg; Otherwise 5 mg on Sun, Tue, Thu; 7.5 mg all other days   Weekly total 45 mg   Plan last modified Tiffanie Salomon RN (9/19/2017)   Next INR check 10/20/2017   Target end date     Indications   Atrial fibrillation (H) [I48.91]  Long-term (current) use of anticoagulants [Z79.01] [Z79.01]         Anticoagulation Episode Summary     INR check location [facility]    Preferred lab     Send INR reminders to CS ANTICOAGULATION    Comments       Anticoagulation Care Providers     Provider Role Specialty Phone number    Yobani magdaleno MD Referring Cardiology [phone]            See the Encounter Report to view Anticoagulation Flowsheet and Dosing Calendar (Go to Encounters tab in chart review, and find the Anticoagulation Therapy Visit)    Dosage adjustment made based on physician directed care plan.      Rachel Pretty RN

## 2017-10-17 ENCOUNTER — OFFICE VISIT (OUTPATIENT)
Dept: FAMILY MEDICINE | Facility: CLINIC | Age: 77
End: 2017-10-17

## 2017-10-17 VITALS
HEIGHT: 71 IN | TEMPERATURE: 97.3 F | SYSTOLIC BLOOD PRESSURE: 127 MMHG | BODY MASS INDEX: 23.8 KG/M2 | OXYGEN SATURATION: 97 % | DIASTOLIC BLOOD PRESSURE: 80 MMHG | WEIGHT: 170 LBS | HEART RATE: 58 BPM

## 2017-10-17 DIAGNOSIS — Z13.6 SCREENING FOR HYPERTENSION: ICD-10-CM

## 2017-10-17 DIAGNOSIS — H61.23 BILATERAL IMPACTED CERUMEN: ICD-10-CM

## 2017-10-17 DIAGNOSIS — Z00.00 MEDICARE ANNUAL WELLNESS VISIT, SUBSEQUENT: Primary | ICD-10-CM

## 2017-10-17 DIAGNOSIS — Z12.5 SCREENING FOR PROSTATE CANCER: ICD-10-CM

## 2017-10-17 DIAGNOSIS — E03.9 ACQUIRED HYPOTHYROIDISM: ICD-10-CM

## 2017-10-17 DIAGNOSIS — Z23 NEEDS FLU SHOT: ICD-10-CM

## 2017-10-17 DIAGNOSIS — E78.00 HYPERCHOLESTEROLEMIA: ICD-10-CM

## 2017-10-17 DIAGNOSIS — R41.3 MEMORY LOSS: ICD-10-CM

## 2017-10-17 LAB
ALT SERPL-CCNC: 27 U/L (ref 0–70)
AST SERPL-CCNC: 25 U/L (ref 0–55)
BUN SERPL-MCNC: 23 MG/DL (ref 7–30)
CALCIUM SERPL-MCNC: 9.4 MG/DL (ref 8.5–10.4)
CHLORIDE SERPLBLD-SCNC: 104 MMOL/L (ref 94–109)
CHOLEST SERPL-MCNC: 200 MG/DL (ref 0–200)
CHOLEST/HDLC SERPL: 3.4 {RATIO} (ref 0–5)
CO2 SERPL-SCNC: 31 MMOL/L (ref 20–32)
CREAT SERPL-MCNC: 1.1 MG/DL (ref 0.8–1.5)
EGFR CALCULATED (BLACK REFERENCE): 83.5
EGFR CALCULATED (NON BLACK REFERENCE): 69
FASTING SPECIMEN: NO
GLUCOSE SERPL-MCNC: 87 MG/DL (ref 60–109)
GLUCOSE SERPL-MCNC: 88 MG/DL (ref 60–109)
HDLC SERPL-MCNC: 59 MG/DL
LDLC SERPL CALC-MCNC: 110 MG/DL (ref 0–129)
POTASSIUM SERPL-SCNC: 4 MMOL/L (ref 3.4–5.3)
PSA SERPL-ACNC: <0.01 UG/L (ref 0–4)
SODIUM SERPL-SCNC: 141 MMOL/L (ref 133–144)
TRIGL SERPL-MCNC: 156 MG/DL (ref 0–150)
TSH SERPL DL<=0.005 MIU/L-ACNC: 2.18 MU/L (ref 0.4–4)
VLDL-CHOLESTEROL: 31 (ref 7–32)

## 2017-10-17 ASSESSMENT — PAIN SCALES - GENERAL: PAINLEVEL: NO PAIN (0)

## 2017-10-17 NOTE — MR AVS SNAPSHOT
After Visit Summary   10/17/2017    Fredrick Mehta    MRN: [id number]           Patient Information     Date Of Birth          1940        Visit Information        Provider Department      10/17/2017 1:40 PM Pieter Ulloa MD [facility]        Today's Diagnoses     Needs flu shot    -  1    Acquired hypothyroidism        Hypercholesterolemia        Screening for hypertension        Screening for prostate cancer        Bilateral impacted cerumen           Follow-ups after your visit        Your next 10 appointments already scheduled     Oct 20, 2017  2:30 PM CDT   Anticoagulation Visit with [facility]ATION CL[facility]IC   [facility]    [address]  [facility] [address]   [phone]            Dec 06, 2017  2:45 PM CST   RETURN GLAUCOMA with Kim Joel MD   [facility] ([facility])    [address]  [address]  [address]  [facility] [address] [phone]              Future tests that were ordered for you today     Open Future Orders        Priority Expected Expires Ordered    REMOVE IMPACTED CERUMEN Routine  10/17/2018 10/17/2017            Who to contact     Please call your clinic at [phone] to:    Ask questions about your health    Make or cancel appointments    Discuss your medicines    Learn about your test results    Speak to your doctor   If you have compliments or concerns about an experience at your clinic, or if you wish to file a complaint, please contact [facility] Patient Relations at [phone] or email us at [email or web address]         Additional Information About Your Visit        Care EveryWhere ID     This is your Care EveryWhere ID. This could be used by other organizations to access your [place] medical records  [id number]        Your Vitals Were     Pulse Temperature Height Pulse Oximetry BMI (Body Mass Index)       58 97.3  F (36.3  C)  "(Oral) 5' 11\" (180.3 cm) 97% 23.71 kg/m2        Blood Pressure from Last 3 Encounters:   10/17/17 127/80   08/02/17 111/71   09/13/16 137/65    Weight from Last 3 Encounters:   10/17/17 170 lb (77.1 kg)   08/02/17 172 lb 12.8 oz (78.4 kg)   09/13/16 182 lb (82.6 kg)              We Performed the Following     Basic Metabolic Panel ([place])     Lipid Panel Plus ([place])     Prostate spec antigen screen     TSH with free T4 reflex          Today's Medication Changes          These changes are accurate as of: 10/17/17  4:04 PM.  If you have any questions, ask your nurse or doctor.               These medicines have changed or have updated prescriptions.        Dose/Directions    warfarin 5 MG tablet   Commonly known as:  COUMADIN   This may have changed:  additional instructions   Used for:  Long term current use of anticoagulant therapy        Take 5 mg by mouth MWF and 7.5 mg ROW, or as directed per Coumadin clinic.   Quantity:  135 tablet   Refills:  0                Primary Care Provider Office Phone # Fax #    Pieter Ulloa [phone]       [address] [place] [address]        Equal Access to Services     JAYSHREE DILLARD AH: Jayne Lund, waирина franco, qasandrine kaalmada jerica, alma smyth. So [facility] [phone].    ATENCIÓN: Si habla español, tiene a myers disposición servicios gratuitos de asistencia lingüística. Bere al [phone].    We comply with applicable federal civil rights laws and [place] laws. We do not discriminate on the basis of race, color, national origin, age, disability, sex, sexual orientation, or gender identity.            Thank you!     Thank you for choosing [facility]  for your care. Our goal is always to provide you with excellent care. Hearing back from our patients is one way we can continue to improve our services. Please take a few minutes to complete the written survey that you may receive " in the mail after your visit with us. Thank you!             Your Updated Medication List - Protect others around you: Learn how to safely use, store and throw away your medicines at www.disposemymeds.org.          This list is accurate as of: 10/17/17  4:04 PM.  Always use your most recent med list.                   Brand Name Dispense Instructions for use Diagnosis    ARICEPT 10 MG tablet   Generic drug:  donepezil     30 tablet    Take 1 tablet (10 mg) by mouth At Bedtime    Memory loss       * DILTIAZEM HCL PO      Take 240 mg by mouth 2 times daily        * diltiazem 240 MG 24 hr capsule    DILACOR XR    180 capsule    Take 1 capsule (240 mg) by mouth two times daily    Essential hypertension       IBUPROFEN PO           levothyroxine 50 MCG tablet    SYNTHROID/LEVOTHROID    90 tablet    Take 1 tablet (50 mcg) by mouth daily    Hypothyroidism, unspecified type       * pravastatin 10 MG tablet    PRAVACHOL    90 tablet    Take 1 tablet (10 mg) by mouth daily Take one 10 mg tablet and one 20 mg tablet to total 30 mg every evening    Mixed hyperlipidemia       * pravastatin 20 MG tablet    PRAVACHOL    90 tablet    Take 1 tablet (20 mg) by mouth daily Take one 10 mg tablet and one 20 mg tablet to total 30 mg every evening    Mixed hyperlipidemia       Travoprost 0.004 % Soln     5 mL    Apply 1 drop to eye At Bedtime 1 drop to both eyes at bedtime    Primary open angle glaucoma of both eyes, moderate stage       TYLENOL PO           warfarin 5 MG tablet    COUMADIN    135 tablet    Take 5 mg by mouth MWF and 7.5 mg ROW, or as directed per Coumadin clinic.    Long term current use of anticoagulant therapy       ZYRTEC ALLERGY PO      Take  by mouth daily as needed.        * Notice:  This list has 4 medication(s) that are the same as other medications prescribed for you. Read the directions carefully, and ask your doctor or other care provider to review them with you.

## 2017-10-17 NOTE — NURSING NOTE
"76 year old  Chief Complaint   Patient presents with     Physical       Blood pressure 127/80, pulse 58, temperature 97.3  F (36.3  C), temperature source Oral, height 5' 11\" (180.3 cm), weight 170 lb (77.1 kg), SpO2 97 %. Body mass index is 23.71 kg/(m^2).  Patient Active Problem List   Diagnosis     Atrial fibrillation (H)     Hypercholesterolemia     Preventative health care     Hypothyroidism     Glaucoma     Long-term (current) use of anticoagulants [Z79.01]     Chronic atrial fibrillation (H)     Acquired hypothyroidism     Memory loss     Primary open angle glaucoma of both eyes, moderate stage       Wt Readings from Last 2 Encounters:   10/17/17 170 lb (77.1 kg)   08/02/17 172 lb 12.8 oz (78.4 kg)     BP Readings from Last 3 Encounters:   10/17/17 127/80   08/02/17 111/71   09/13/16 137/65         Current Outpatient Prescriptions   Medication     warfarin (COUMADIN) 5 MG tablet     Acetaminophen (TYLENOL PO)     IBUPROFEN PO     diltiazem (DILACOR XR) 240 MG 24 hr capsule     DILTIAZEM HCL PO     Travoprost 0.004 % SOLN     pravastatin (PRAVACHOL) 10 MG tablet     pravastatin (PRAVACHOL) 20 MG tablet     donepezil (ARICEPT) 10 MG tablet     levothyroxine (SYNTHROID, LEVOTHROID) 50 MCG tablet     Cetirizine HCl (ZYRTEC ALLERGY PO)     No current facility-administered medications for this visit.        Social History   Substance Use Topics     Smoking status: Former Smoker     Smokeless tobacco: Never Used     Alcohol use No       Health Maintenance Due   Topic Date Due     ADVANCE DIRECTIVE PLANNING Q5 YRS  12/02/1994     FALL RISK ASSESSMENT  12/02/2004     OP ANNUAL INR REFERRAL  10/16/2016     INFLUENZA VACCINE (SYSTEM ASSIGNED)  09/01/2017       No results found for: JUAN JOSÉ Sarmiento, TADEO  October 17, 2017 1:32 PM  "

## 2017-10-17 NOTE — LETTER
"                                      [facility]  [address]  [facility] [address]  Phone: [phone]  Fax: [phone]       Date: October 19, 2017      Fredrick Mehta  [address]  [facility] [address]        Dear Fredrick,    It was such a pleasure to see you yesterday (Tuesday).  Here's a copy of your lab results.    Your TSH (thyroid) test is perfectly normal.    Also, your PSA (prostate test) level is undetectably low.    Your lipid/cholesterol panel is perfectly normal.  (I'm not concerned with your triglycerides--\"fats\"--being a bit elevated at 156, just 6 points high.)    Finally, your basic metabolic panel (which measures your glucose or sugar level, calcium, kidney function tests, and electrolytes) was completely normal, top to bottom.    Take care!    Pieter Hamilton   Basic Metabolic Panel ([place])   Result Value Ref Range    Glucose 88.0 60.0 - 109.0 mg/dL    Urea Nitrogen 23.0 7.0 - 30.0 mg/dL    Calcium 9.4 8.5 - 10.4 mg/dL    Creatinine 1.1 0.8 - 1.5 mg/dL    eGFR Calculated (Non Black Reference) 69.0 >60.0    eGFR Calculated (Black Reference) 83.5 >60.0    Sodium 141.0 133.0 - 144.0 mmol/L    Potassium 4.0 3.4 - 5.3 mmol/L    Chloride 104.0 94.0 - 109.0 mmol/L    Carbon Dioxide 31.0 20.0 - 32.0 mmol/L   Lipid Panel Plus ([place])   Result Value Ref Range    FASTING SPECIMEN no     Glucose 87.0 60.0 - 109.0 mg/dL    ALT 27.0 0.0 - 70.0 U/L    AST 25.0 0.0 - 55.0 U/L    Cholesterol 200.0 0.0 - 200.0    HDL Cholesterol 59.0 >40.0    Triglycerides 156.0 (H) 0.0 - 150.0    Cholesterol/HDL Ratio 3.4 0.0 - 5.0    LDL Cholesterol Direct 110.0 0.0 - 129.0    VLDL-Cholesterol 31.0 7.0 - 32.0   TSH with free T4 reflex   Result Value Ref Range    TSH 2.18 0.40 - 4.00 mU/L   Prostate spec antigen screen   Result Value Ref Range    PSA <0.01 0 - 4 ug/L      Comment:      Assay Method:  Chemiluminescence using Siemens Vista analyzer "

## 2017-10-20 ENCOUNTER — ANTICOAGULATION THERAPY VISIT (OUTPATIENT)
Dept: NURSING | Facility: CLINIC | Age: 77
End: 2017-10-20
Payer: COMMERCIAL

## 2017-10-20 DIAGNOSIS — I48.91 ATRIAL FIBRILLATION (H): ICD-10-CM

## 2017-10-20 DIAGNOSIS — Z79.01 LONG-TERM (CURRENT) USE OF ANTICOAGULANTS: ICD-10-CM

## 2017-10-20 LAB — INR POINT OF CARE: 2.2 (ref 0.86–1.14)

## 2017-10-20 PROCEDURE — 85610 PROTHROMBIN TIME: CPT | Mod: QW

## 2017-10-20 PROCEDURE — 36416 COLLJ CAPILLARY BLOOD SPEC: CPT

## 2017-10-20 PROCEDURE — 99207 ZZC NO CHARGE NURSE ONLY: CPT

## 2017-10-20 NOTE — MR AVS SNAPSHOT
Fredrick Mehta   10/20/2017 2:30 PM   Anticoagulation Therapy Visit    Description:  76 year old male   Provider:  [facility]   Department:  [facility] Nurse           INR as of 10/20/2017     Today's INR 2.2      Anticoagulation Summary as of 10/20/2017     INR goal 2.0-2.5   Today's INR 2.2   Full instructions 5 mg on Mon, Thu; 7.5 mg all other days   Next INR check 11/2/2017    Indications   Atrial fibrillation (H) [I48.91]  Long-term (current) use of anticoagulants [Z79.01] [Z79.01]         Your next Anticoagulation Clinic appointment(s)     Nov 02, 2017  3:00 PM CDT   Anticoagulation Visit with  ANTICOAGULATION CLINIC   [facility] ([facility])    [address]  [place] [address]   [phone]              Contact Numbers     Clinic Number:         October 2017 Details    Sun Mon Tue Wed Thu Fri Sat     1               2               3               4               5               6               7                 8               9               10               11               12               13               14                 15               16               17               18               19               20      7.5 mg   See details      21      7.5 mg           22      7.5 mg         23      5 mg         24      7.5 mg         25      7.5 mg         26      5 mg         27      7.5 mg         28      7.5 mg           29      7.5 mg         30      5 mg         31      7.5 mg              Date Details   10/20 This INR check               How to take your warfarin dose     To take:  5 mg Take 1 of the 5 mg tablets.    To take:  7.5 mg Take 1.5 of the 5 mg tablets.           November 2017 Details    Sun Mon Tue Wed Thu Fri Sat        1      7.5 mg         2            3               4                 5               6               7               8               9               10               11                 12               13               14                15               16               17               18                 19               20               21               22               23               24               25                 26               27               28               29               30                  Date Details   No additional details    Date of next INR:  11/2/2017         How to take your warfarin dose     To take:  5 mg Take 1 of the 5 mg tablets.    To take:  7.5 mg Take 1.5 of the 5 mg tablets.

## 2017-10-20 NOTE — PROGRESS NOTES
ANTICOAGULATION FOLLOW-UP CLINIC VISIT    Patient Name:  Fredrick Mehta  Date:  10/20/2017  Contact Type:  Face to Face    SUBJECTIVE:     Patient Findings     Positives No Problem Findings           OBJECTIVE    INR Protime   Date Value Ref Range Status   10/20/2017 2.2 (A) 0.86 - 1.14 Final       ASSESSMENT / PLAN  INR assessment THER    Recheck INR In: 2 WEEKS    INR Location Clinic      Anticoagulation Summary as of 10/20/2017     INR goal 2.0-2.5   Today's INR 2.2   Maintenance plan 5 mg (5 mg x 1) on Mon, Thu; 7.5 mg (5 mg x 1.5) all other days   Full instructions 5 mg on Mon, Thu; 7.5 mg all other days   Weekly total 47.5 mg   Plan last modified Jessica Coker RN (10/20/2017)   Next INR check 11/2/2017   Target end date     Indications   Atrial fibrillation (H) [I48.91]  Long-term (current) use of anticoagulants [Z79.01] [Z79.01]         Anticoagulation Episode Summary     INR check location [facility]    Preferred lab     Send INR reminders to [facility] ANTICOAG[facility]    Comments       Anticoagulation Care Providers     Provider Role Specialty Phone number    [facility] Referring Cardiology [phone]            See the Encounter Report to view Anticoagulation Flowsheet and Dosing Calendar (Go to Encounters tab in chart review, and find the Anticoagulation Therapy Visit)    Dosage adjustment made based on physician directed care plan.    Jessica Coker RN

## 2017-10-24 ENCOUNTER — OFFICE VISIT (OUTPATIENT)
Dept: FAMILY MEDICINE | Facility: CLINIC | Age: 77
End: 2017-10-24

## 2017-10-24 VITALS
OXYGEN SATURATION: 97 % | DIASTOLIC BLOOD PRESSURE: 81 MMHG | HEART RATE: 51 BPM | SYSTOLIC BLOOD PRESSURE: 127 MMHG | TEMPERATURE: 97.4 F

## 2017-10-24 DIAGNOSIS — H61.23 BILATERAL IMPACTED CERUMEN: Primary | ICD-10-CM

## 2017-10-24 ASSESSMENT — PAIN SCALES - GENERAL: PAINLEVEL: NO PAIN (0)

## 2017-10-24 NOTE — PROGRESS NOTES
SUBJECTIVE:   Fredrick Mehta is a 76 year old male who presents to clinic today for the following health issues:    Acute Illness   Acute illness concerns: Ear problem, Patient has a history of cerumen impaction and uses earwax removal drops on a regular basis at home. He complains of plugging sensation over the past several weeks and has had to have irrigation in the past. He denies any associated pain, darinage, redness or swelling.        Problem list and histories reviewed & adjusted, as indicated.  Additional history: as documented    Reviewed and updated as needed this visit by clinical staffTobacco  Allergies  Meds  Problems  Med Hx  Surg Hx  Fam Hx  Soc Hx        Reviewed and updated as needed this visit by Provider  Allergies  Meds  Problems         ROS:  Constitutional, HEENT, cardiovascular, pulmonary, gi and gu systems are negative, except as otherwise noted.      OBJECTIVE:   /81 (BP Location: Left arm, Patient Position: Chair, Cuff Size: Adult Regular)  Pulse 51  Temp 97.4  F (36.3  C) (Oral)  SpO2 97%  There is no height or weight on file to calculate BMI.  GENERAL: healthy, alert and no distress  HENT: both ears: Both ear canals are completely occluded with soft brown cerumen. Curettage was attempted by this provider bilaterally and was unable to clear canals by curette alone. Following bilateral irrigation both canals were clear TMs were visualized and no erythema or effusion noted. and nose and mouth without ulcers or lesions  NECK: no adenopathy, no asymmetry, masses, or scars and thyroid normal to palpation  RESP: lungs clear to auscultation - no rales, rhonchi or wheezes  CV: regular rate and rhythm, normal S1 S2, no S3 or S4, no murmur, click or rub, no peripheral edema and peripheral pulses strong  SKIN: Multiple separate keratoses noted around head and neck.        ASSESSMENT/PLAN:         ICD-10-CM    1. Bilateral impacted cerumen H61.23      Cerumenosis is noted.  Wax is  removed by syringing and manual debridement. Instructions for home care to prevent wax buildup are given.  See Patient Instructions    Sandi Mitchell PA-C  [facility]

## 2017-10-24 NOTE — PATIENT INSTRUCTIONS
Earwax, Home Treatment    Everyone produces earwax from the lining of the ear canal. It serves to lubricate and protect the ear. The wax that forms in the canal naturally moves toward the outside of the ear and falls out. Sometimes the ear canal may contain too much wax. This can cause a blockage and loss of hearing. Directions are given below for home treatment.  Home care  If your doctor has advised you to remove a wax blockage yourself, follow these directions:    Unless a medicine was prescribed, you may use an over-the-counter product made for clearing earwax. These contain carbamide peroxide. Lie down with the blocked ear facing upward. Apply one dropper full of medicine and wait a few minutes. Grasp the outer ear and wiggle it to help the solution enter the canal.    Lean over a sink or basin with the blocked ear facing downward. Use a bulb syringe filled with warm (not hot or cold) water to rinse the ear several times. Use gentle pressure only.    If you are having trouble draining the water out of your ear canal, put a few drops of rubbing alcohol (isopropyl alcohol) into the ear canal. This will help remove the remaining water.    Repeat this procedure once a day for up to three days, or until your hearing is back to normal. Do not use this treatment for more than three days in a row.  Don ts    Don t use cold water to rinse the ear. This will make you dizzy.    Don t perform this procedure if you have an ear infection.    Don t perform this procedure if you have a ruptured eardrum.    Don t use cotton swabs, matches, hairpins, keys, or other objects to  clean  the ear canal. This can cause infection of the ear canal or rupture the eardrum. Because of their size and shape, cotton swabs can push earwax deeper into the ear canal instead of removing it.  Follow-up care  Follow up with your health care provider if you are not improving after three cleaning attempts, or as advised.  When to seek medical  advice  Call your health care provider right away if any of these occur:    Worsening ear pain    Fever of 101 F (38.3 C) or higher, or as directed by your health care provider    Hearing does not return to normal after three days of treatment    Fluid drainage or bleeding from the ear canal    Swelling, redness, or tenderness of the outer ear    Headache, neck pain, or stiff neck    8001-2306 [employer]. [address] [place] [address]. All rights reserved. This information is not intended as a substitute for professional medical care. Always follow your healthcare professional's instructions.

## 2017-10-24 NOTE — NURSING NOTE
76 year old  Chief Complaint   Patient presents with     Ear Plugs     on both ears.       Blood pressure 127/81, pulse 51, temperature 97.4  F (36.3  C), temperature source Oral, SpO2 97 %. There is no height or weight on file to calculate BMI.  Patient Active Problem List   Diagnosis     Atrial fibrillation (H)     Hypercholesterolemia     Preventative health care     Hypothyroidism     Glaucoma     Long-term (current) use of anticoagulants [Z79.01]     Chronic atrial fibrillation (H)     Acquired hypothyroidism     Memory loss     Primary open angle glaucoma of both eyes, moderate stage       Wt Readings from Last 2 Encounters:   10/17/17 170 lb (77.1 kg)   08/02/17 172 lb 12.8 oz (78.4 kg)     BP Readings from Last 3 Encounters:   10/24/17 127/81   10/17/17 127/80   08/02/17 111/71         Current Outpatient Prescriptions   Medication     warfarin (COUMADIN) 5 MG tablet     Acetaminophen (TYLENOL PO)     IBUPROFEN PO     diltiazem (DILACOR XR) 240 MG 24 hr capsule     DILTIAZEM HCL PO     Travoprost 0.004 % SOLN     pravastatin (PRAVACHOL) 10 MG tablet     pravastatin (PRAVACHOL) 20 MG tablet     donepezil (ARICEPT) 10 MG tablet     levothyroxine (SYNTHROID, LEVOTHROID) 50 MCG tablet     Cetirizine HCl (ZYRTEC ALLERGY PO)     No current facility-administered medications for this visit.        Social History   Substance Use Topics     Smoking status: Former Smoker     Smokeless tobacco: Never Used     Alcohol use No       Health Maintenance Due   Topic Date Due     ADVANCE DIRECTIVE PLANNING Q5 YRS  12/02/1994     FALL RISK ASSESSMENT  12/02/2004     OP ANNUAL INR REFERRAL  10/16/2016       No results found for: JUAN JOSÉ Sarmiento CMA  October 24, 2017 10:37 AM

## 2017-10-24 NOTE — MR AVS SNAPSHOT
After Visit Summary   10/24/2017    Fredrick Mehta    MRN: [id number]           Patient Information     Date Of Birth          1940        Visit Information        Provider Department      10/24/2017 10:40 AM Sandi Mitchell PA-C [facility]         Follow-ups after your visit        Your next 10 appointments already scheduled     Nov 02, 2017  3:00 PM CDT   Anticoagulation Visit with  ANTICOAGULATION CLINIC   [facility]    [address]  [facility] [address] [phone]            Dec 06, 2017  2:45 PM CST   RETURN GLAUCOMA with Kim Joel MD   [facility] ([facility])    [address]  [address]  [address]  [facility] [address] [phone]              Who to contact     Please call your clinic at [phone] to:    Ask questions about your health    Make or cancel appointments    Discuss your medicines    Learn about your test results    Speak to your doctor   If you have compliments or concerns about an experience at your clinic, or if you wish to file a complaint, please contact [facility] Patient Relations at [phone] or email us at [email or web address]         Additional Information About Your Visit        Care EveryWhere ID     This is your Care EveryWhere ID. This could be used by other organizations to access your [facility] medical records  [id number]        Your Vitals Were     Pulse Temperature Pulse Oximetry             51 97.4  F (36.3  C) (Oral) 97%          Blood Pressure from Last 3 Encounters:   10/24/17 127/81   10/17/17 127/80   08/02/17 111/71    Weight from Last 3 Encounters:   10/17/17 170 lb (77.1 kg)   08/02/17 172 lb 12.8 oz (78.4 kg)   09/13/16 182 lb (82.6 kg)              Today, you had the following     No orders found for display         Today's Medication Changes          These changes are accurate as of: 10/24/17 12:13 PM.  If  you have any questions, ask your nurse or doctor.               These medicines have changed or have updated prescriptions.        Dose/Directions    warfarin 5 MG tablet   Commonly known as:  COUMADIN   This may have changed:  additional instructions   Used for:  Long term current use of anticoagulant therapy        Take 5 mg by mouth MWF and 7.5 mg ROW, or as directed per Coumadin clinic.   Quantity:  135 tablet   Refills:  0                Primary Care Provider Office Phone # Fax #    Pieter Ulloa [phone]       [address] [place] [address]        Equal Access to Services     [facility]: Hadii allie zuniga hadasho Sodanika, waaxda luqadaha, qaybta kaalmada jerica, alma walton . So [facility] [phone].    ATENCIÓN: Si habla español, tiene a myers disposición servicios gratuitos de asistencia lingüística. [facility] [phone].    We comply with applicable federal civil rights laws and [place] laws. We do not discriminate on the basis of race, color, national origin, age, disability, sex, sexual orientation, or gender identity.            Thank you!     Thank you for choosing [facility]  for your care. Our goal is always to provide you with excellent care. Hearing back from our patients is one way we can continue to improve our services. Please take a few minutes to complete the written survey that you may receive in the mail after your visit with us. Thank you!             Your Updated Medication List - Protect others around you: Learn how to safely use, store and throw away your medicines at www.disposemymeds.org.          This list is accurate as of: 10/24/17 12:13 PM.  Always use your most recent med list.                   Brand Name Dispense Instructions for use Diagnosis    ARICEPT 10 MG tablet   Generic drug:  donepezil     30 tablet    Take 1 tablet (10 mg) by mouth At Bedtime    Memory loss       * DILTIAZEM HCL PO      Take 240 mg by  mouth 2 times daily        * diltiazem 240 MG 24 hr capsule    DILACOR XR    180 capsule    Take 1 capsule (240 mg) by mouth two times daily    Essential hypertension       IBUPROFEN PO           levothyroxine 50 MCG tablet    SYNTHROID/LEVOTHROID    90 tablet    Take 1 tablet (50 mcg) by mouth daily    Hypothyroidism, unspecified type       * pravastatin 10 MG tablet    PRAVACHOL    90 tablet    Take 1 tablet (10 mg) by mouth daily Take one 10 mg tablet and one 20 mg tablet to total 30 mg every evening    Mixed hyperlipidemia       * pravastatin 20 MG tablet    PRAVACHOL    90 tablet    Take 1 tablet (20 mg) by mouth daily Take one 10 mg tablet and one 20 mg tablet to total 30 mg every evening    Mixed hyperlipidemia       Travoprost 0.004 % Soln     5 mL    Apply 1 drop to eye At Bedtime 1 drop to both eyes at bedtime    Primary open angle glaucoma of both eyes, moderate stage       TYLENOL PO           warfarin 5 MG tablet    COUMADIN    135 tablet    Take 5 mg by mouth MWF and 7.5 mg ROW, or as directed per Coumadin clinic.    Long term current use of anticoagulant therapy       ZYRTEC ALLERGY PO      Take  by mouth daily as needed.        * Notice:  This list has 4 medication(s) that are the same as other medications prescribed for you. Read the directions carefully, and ask your doctor or other care provider to review them with you.

## 2017-10-28 NOTE — PROGRESS NOTES
"CHIEF COMPLAINT:  Medicare annual wellness visit, subsequent.      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  Overall, he is doing fairly well.  He has memory loss and is on donepezil 10 mg daily for that.  They both feel that it is fairly stable and would like to continue with this course before adding another medication like Namenda.      He has chronic atrial fibrillation, and he is in the Warfarin Clinic for that reason.  He has hyperlipidemia and is on pravastatin for that.  He has some hypothyroidism, and that is treated with levothyroxine 50 mcg daily.  Finally, he has some glaucoma, and that is followed by Ophthalmology.      SOCIAL, FAMILY AND PAST SURGICAL HISTORIES:  Unchanged.      MEDICARE QUESTIONS:  He is actually able to do almost all of his activities of daily living.  He drives, but his wife acts as a bit of a \"copilot.\"  That being said, he would like to get some [employer] paperwork filled out today, and I am happy to do that for them.  He has had no falls at home.  No symptoms of depression.  Memory loss is an issue, of course, as addressed above.      HEALTHCARE MAINTENANCE:  He wears glasses, and he just had his eyes checked.  He has some glaucoma, and he is followed closely as mentioned.  Hearing is fine.  He can actually hear a finger rub quite easily bilaterally.  Dental care is up-to-date, and he typically goes every 4 months.  Blood pressure is perfect at 127/80.  Body mass index is 23.71.  Immunizations are up-to-date.  He has already had a high-dose seasonal influenza immunization.  From a lab standpoint, we are going to check a TSH with cascade, a PSA 50, a lipid panel plus and a basic metabolic panel.  He will be notified of all those results, of course.  From a colon screening standpoint, he is up-to-date.      REVIEW OF SYSTEMS:  A 10 point review of systems is negative.      OBJECTIVE:     GENERAL:  Fredrick is " in no distress.  He is very conversant despite his memory loss.  He and I had a wonderful conversation about growing up in [place] during [place].  He could recall events with great detail and enthusiasm.  They got much trickier as he was trying to remember more current things.     VITAL SIGNS:  BP is 127/80 with a heart rate of 58 and regular.  Temperature is 97.3.  He is 5 feet 11 and weighs 170 pounds with a BMI of 23.71.   HEENT:  Head is normocephalic and atraumatic.  Ears show significant cerumen bilaterally.  His eyes are grossly normal.  There is no pain with palpation of the frontal or maxillary sinuses.  Nose is free of any congestion or discharge.  Teeth are in good repair.  No dentures.  Mucous membranes are moist.  Throat looks benign.     NECK:  Supple without adenopathy.  No carotid bruits are heard.  No JVD.   BACK:  Smooth and straight.  No pain to percussion.  No CVA tenderness.   LUNGS:  Clear to auscultation bilaterally.   HEART:  Regular rate and rhythm without murmur.   EXTREMITIES:  Ankles are free of any edema.  Skin is warm and dry.  Good peripheral pulses.      LABORATORY DATA:  Pending.      ASSESSMENT/PLAN:   1.  Fredrick is a 76-year-old gentleman here for a Medicare annual wellness visit, subsequent.    2.  Up-to-date with immunizations, including tetanus, influenza, shingles and pneumococcal vaccines.   3.  No falls at home.   4.  Memory loss continues, but is holding steady.  On donepezil (Aricept) 10 mg daily.  Continue with that for now.  We will consider adding Namenda down the road.   5.  Eye care and dental care are up-to-date.   6.  Bilateral cerumen impaction.  They are going to purchase some over-the-counter drops at home and then come back at some point for ear irrigation.  That order has been placed.   7.  History of hypothyroidism.  TSH pending.   8.  [employer] paperwork completed.

## 2017-10-30 DIAGNOSIS — E03.9 HYPOTHYROIDISM, UNSPECIFIED TYPE: ICD-10-CM

## 2017-10-30 RX ORDER — LEVOTHYROXINE SODIUM 50 UG/1
50 TABLET ORAL DAILY
Qty: 90 TABLET | Refills: 3 | Status: SHIPPED | OUTPATIENT
Start: 2017-10-30 | End: 2018-09-12

## 2017-11-02 ENCOUNTER — TELEPHONE (OUTPATIENT)
Dept: FAMILY MEDICINE | Facility: CLINIC | Age: 77
End: 2017-11-02

## 2017-11-02 ENCOUNTER — ANTICOAGULATION THERAPY VISIT (OUTPATIENT)
Dept: NURSING | Facility: CLINIC | Age: 77
End: 2017-11-02
Payer: COMMERCIAL

## 2017-11-02 DIAGNOSIS — I48.91 ATRIAL FIBRILLATION (H): ICD-10-CM

## 2017-11-02 DIAGNOSIS — Z79.01 LONG-TERM (CURRENT) USE OF ANTICOAGULANTS: ICD-10-CM

## 2017-11-02 DIAGNOSIS — I48.20 CHRONIC ATRIAL FIBRILLATION (H): Primary | ICD-10-CM

## 2017-11-02 LAB — INR POINT OF CARE: 2.1 (ref 0.86–1.14)

## 2017-11-02 PROCEDURE — 85610 PROTHROMBIN TIME: CPT | Mod: QW

## 2017-11-02 PROCEDURE — 99207 ZZC NO CHARGE NURSE ONLY: CPT

## 2017-11-02 PROCEDURE — 36416 COLLJ CAPILLARY BLOOD SPEC: CPT

## 2017-11-02 NOTE — TELEPHONE ENCOUNTER
Per Dr. Ulloa [employer] form has been sent in.  Referral for the [facility] has been done and I sent a copy to them at their home address.  Wife will make the appointment when he needs this.  Marisel LUA.

## 2017-11-02 NOTE — PROGRESS NOTES
ANTICOAGULATION FOLLOW-UP CLINIC VISIT    Patient Name:  Fredrick Mehta  Date:  11/2/2017  Contact Type:  Face to Face    SUBJECTIVE:     Patient Findings     Positives No Problem Findings           OBJECTIVE    INR Protime   Date Value Ref Range Status   11/02/2017 2.1 (A) 0.86 - 1.14 Final       ASSESSMENT / PLAN  INR assessment THER    Recheck INR In: 3 WEEKS    INR Location Clinic      Anticoagulation Summary as of 11/2/2017     INR goal 2.0-2.5   Today's INR 2.1   Maintenance plan 5 mg (5 mg x 1) on Mon, Thu; 7.5 mg (5 mg x 1.5) all other days   Full instructions 5 mg on Mon, Thu; 7.5 mg all other days   Weekly total 47.5 mg   No change documented Jessica Coker RN   Plan last modified Jessica Coker RN (10/20/2017)   Next INR check 11/21/2017   Target end date     Indications   Atrial fibrillation (H) [I48.91]  Long-term (current) use of anticoagulants [Z79.01] [Z79.01]         Anticoagulation Episode Summary     INR check location [facility]    Preferred lab     Send INR reminders to  ANTICOAGULATION    Comments       Anticoagulation Care Providers     Provider Role Specialty Phone number    Yobani magdaleno MD Referring Cardiology [phone]            See the Encounter Report to view Anticoagulation Flowsheet and Dosing Calendar (Go to Encounters tab in chart review, and find the Anticoagulation Therapy Visit)    Dosage adjustment made based on physician directed care plan.    Jessica Coker RN

## 2017-11-02 NOTE — MR AVS SNAPSHOT
Fredrick Mehta   11/2/2017 3:00 PM   Anticoagulation Therapy Visit    Description:  76 year old male   Provider:   ANTICOAGULATION CLINIC   Department:  [facility] Nurse           INR as of 11/2/2017     Today's INR 2.1      Anticoagulation Summary as of 11/2/2017     INR goal 2.0-2.5   Today's INR 2.1   Full instructions 5 mg on Mon, Thu; 7.5 mg all other days   Next INR check 11/21/2017    Indications   Atrial fibrillation (H) [I48.91]  Long-term (current) use of anticoagulants [Z79.01] [Z79.01]         Your next Anticoagulation Clinic appointment(s)     Nov 21, 2017  3:00 PM CST   Anticoagulation Visit with  ANTICOAGULATION CLINIC   [facility]    [address]  [place] [address]   [phone]              Contact Numbers     Clinic Number:         November 2017 Details    Sun Mon Tue Wed Thu Fri Sat        1               2      5 mg   See details      3      7.5 mg         4      7.5 mg           5      7.5 mg         6      5 mg         7      7.5 mg         8      7.5 mg         9      5 mg         10      7.5 mg         11      7.5 mg           12      7.5 mg         13      5 mg         14      7.5 mg         15      7.5 mg         16      5 mg         17      7.5 mg         18      7.5 mg           19      7.5 mg         20      5 mg         21            22               23               24               25                 26               27               28               29               30                  Date Details   11/02 This INR check       Date of next INR:  11/21/2017         How to take your warfarin dose     To take:  5 mg Take 1 of the 5 mg tablets.    To take:  7.5 mg Take 1.5 of the 5 mg tablets.

## 2017-11-07 ENCOUNTER — TRANSFERRED RECORDS (OUTPATIENT)
Dept: HEALTH INFORMATION MANAGEMENT | Facility: CLINIC | Age: 77
End: 2017-11-07

## 2017-11-21 ENCOUNTER — ANTICOAGULATION THERAPY VISIT (OUTPATIENT)
Dept: NURSING | Facility: CLINIC | Age: 77
End: 2017-11-21
Payer: COMMERCIAL

## 2017-11-21 DIAGNOSIS — Z79.01 LONG-TERM (CURRENT) USE OF ANTICOAGULANTS: ICD-10-CM

## 2017-11-21 DIAGNOSIS — I48.91 ATRIAL FIBRILLATION, UNSPECIFIED TYPE (H): ICD-10-CM

## 2017-11-21 LAB — INR POINT OF CARE: 2.7 (ref 0.86–1.14)

## 2017-11-21 PROCEDURE — 85610 PROTHROMBIN TIME: CPT | Mod: QW

## 2017-11-21 PROCEDURE — 36416 COLLJ CAPILLARY BLOOD SPEC: CPT

## 2017-11-21 PROCEDURE — 99207 ZZC NO CHARGE NURSE ONLY: CPT

## 2017-11-21 NOTE — MR AVS SNAPSHOT
Fredrick Mehta   11/21/2017 3:00 PM   Anticoagulation Therapy Visit    Description:  76 year old male   Provider:   ANTICOAGULATION CLINIC   Department:  [facility] Nurse           INR as of 11/21/2017     Today's INR 2.7!      Anticoagulation Summary as of 11/21/2017     INR goal 2.0-2.5   Today's INR 2.7!   Full instructions 5 mg on Mon, Thu; 7.5 mg all other days   Next INR check 12/19/2017    Indications   Atrial fibrillation (H) [I48.91]  Long-term (current) use of anticoagulants [Z79.01] [Z79.01]         Your next Anticoagulation Clinic appointment(s)     Dec 19, 2017  3:45 PM CST   Anticoagulation Visit with  ANTICOAGULATION CLINIC   [facility]    [address]  [place] [address]   [phone]              Contact Numbers     Clinic Number:         November 2017 Details    Sun Mon Tue Wed Thu Fri Sat        1               2               3               4                 5               6               7               8               9               10               11                 12               13               14               15               16               17               18                 19               20               21      7.5 mg   See details      22      7.5 mg         23      5 mg         24      7.5 mg         25      7.5 mg           26      7.5 mg         27      5 mg         28      7.5 mg         29      7.5 mg         30      5 mg            Date Details   11/21 This INR check               How to take your warfarin dose     To take:  5 mg Take 1 of the 5 mg tablets.    To take:  7.5 mg Take 1.5 of the 5 mg tablets.           December 2017 Details    Sun Mon Tue Wed Thu Fri Sat          1      7.5 mg         2      7.5 mg           3      7.5 mg         4      5 mg         5      7.5 mg         6      7.5 mg         7      5 mg         8      7.5 mg         9      7.5 mg           10      7.5 mg         11      5 mg         12      7.5  mg         13      7.5 mg         14      5 mg         15      7.5 mg         16      7.5 mg           17      7.5 mg         18      5 mg         19            20               21               22               23                 24               25               26               27               28               29               30                 31                      Date Details   No additional details    Date of next INR:  12/19/2017         How to take your warfarin dose     To take:  5 mg Take 1 of the 5 mg tablets.    To take:  7.5 mg Take 1.5 of the 5 mg tablets.

## 2017-11-21 NOTE — PROGRESS NOTES
ANTICOAGULATION FOLLOW-UP CLINIC VISIT    Patient Name:  Fredrick Mehta  Date:  11/21/2017  Contact Type:  Face to Face    SUBJECTIVE:     Patient Findings     Positives No Problem Findings           OBJECTIVE    INR Protime   Date Value Ref Range Status   11/21/2017 2.7 (A) 0.86 - 1.14 Final       ASSESSMENT / PLAN  INR assessment THER    Recheck INR In: 4 WEEKS    INR Location Clinic      Anticoagulation Summary as of 11/21/2017     INR goal 2.0-2.5   Today's INR 2.7!   Maintenance plan 5 mg (5 mg x 1) on Mon, Thu; 7.5 mg (5 mg x 1.5) all other days   Full instructions 5 mg on Mon, Thu; 7.5 mg all other days   Weekly total 47.5 mg   No change documented Ashley Kinney RN   Plan last modified Jessica Coker RN (10/20/2017)   Next INR check 12/19/2017   Target end date     Indications   Atrial fibrillation (H) [I48.91]  Long-term (current) use of anticoagulants [Z79.01] [Z79.01]         Anticoagulation Episode Summary     INR check location [facility]    Preferred lab     Send INR reminders to  ANTICOAGULATION    Comments       Anticoagulation Care Providers     Provider Role Specialty Phone number    Yobani Sarkar MD Referring Cardiology [phone]            See the Encounter Report to view Anticoagulation Flowsheet and Dosing Calendar (Go to Encounters tab in chart review, and find the Anticoagulation Therapy Visit)    Dosage adjustment made based on physician directed care plan.    Ashley Kinney RN

## 2017-11-28 DIAGNOSIS — I48.20 CHRONIC ATRIAL FIBRILLATION (H): Primary | ICD-10-CM

## 2017-11-29 RX ORDER — DILTIAZEM HYDROCHLORIDE 120 MG/1
120 CAPSULE, COATED, EXTENDED RELEASE ORAL 2 TIMES DAILY
Qty: 180 CAPSULE | Refills: 0 | Status: SHIPPED
Start: 2017-11-29 | End: 2017-12-26

## 2017-12-06 ENCOUNTER — OFFICE VISIT (OUTPATIENT)
Dept: OPHTHALMOLOGY | Facility: CLINIC | Age: 77
End: 2017-12-06
Attending: OPHTHALMOLOGY
Payer: MEDICARE

## 2017-12-06 DIAGNOSIS — H40.1132 PRIMARY OPEN ANGLE GLAUCOMA OF BOTH EYES, MODERATE STAGE: Primary | ICD-10-CM

## 2017-12-06 PROCEDURE — 99213 OFFICE O/P EST LOW 20 MIN: CPT | Mod: ZF

## 2017-12-06 PROCEDURE — 92133 CPTRZD OPH DX IMG PST SGM ON: CPT | Mod: ZF | Performed by: OPHTHALMOLOGY

## 2017-12-06 ASSESSMENT — REFRACTION_WEARINGRX
OD_AXIS: 137
OS_CYLINDER: +1.00
OD_SPHERE: +4.25
OS_AXIS: 097
OS_SPHERE: +4.00
OD_CYLINDER: +1.00

## 2017-12-06 ASSESSMENT — VISUAL ACUITY
METHOD: SNELLEN - LINEAR
OD_CC+: -3
CORRECTION_TYPE: GLASSES
OD_CC: 20/20
OS_CC: 20/20

## 2017-12-06 ASSESSMENT — SLIT LAMP EXAM - LIDS
COMMENTS: NORMAL
COMMENTS: NORMAL

## 2017-12-06 ASSESSMENT — TONOMETRY
OD_IOP_MMHG: 16
OS_IOP_MMHG: 15
IOP_METHOD: APPLANATION

## 2017-12-06 ASSESSMENT — CONF VISUAL FIELD
OD_NORMAL: 1
OS_NORMAL: 1

## 2017-12-06 ASSESSMENT — CUP TO DISC RATIO
OD_RATIO: 0.8
OS_RATIO: 0.8

## 2017-12-06 NOTE — NURSING NOTE
Chief Complaints and History of Present Illnesses   Patient presents with     Follow Up For     Primary open angle glaucoma of both eyes     HPI    Affected eye(s):  Both   Symptoms:     No decreased vision   No floaters   No flashes   No foreign body sensation   No Dryness      Duration:  4 months   Frequency:  Constant       Do you have eye pain now?:  No      Comments:  4 month f/u POAG  Pt states vision stable since last visit    Ocular meds:  Travatan QHS, both eyes    Shahida CHUN 3:01 PM December 6, 2017

## 2017-12-06 NOTE — MR AVS SNAPSHOT
After Visit Summary   12/6/2017    Fredrick Mehta    MRN: [id number]           Patient Information     Date Of Birth          1940        Visit Information        Provider Department      12/6/2017 2:45 PM Kim Joel MD [facility]        Today's Diagnoses     Primary open angle glaucoma of both eyes, moderate stage    -  1       Follow-ups after your visit        Follow-up notes from your care team     Return in about 6 months (around 6/6/2018) for visual field 24-2.      Your next 10 appointments already scheduled     Dec 19, 2017  3:45 PM CST   Anticoagulation Visit with  [facility] CLINIC   [facility]    [address]  [facility] [address]   [phone]            Jun 06, 2018  1:45 PM CDT   RETURN GLAUCOMA with Kim Joel MD   Eye Clinic ([facility])    [address]  [address] [facility]  [address]  [facility] [address] [phone]              Who to contact     Please call your clinic at [phone] to:    Ask questions about your health    Make or cancel appointments    Discuss your medicines    Learn about your test results    Speak to your doctor   If you have compliments or concerns about an experience at your clinic, or if you wish to file a complaint, please contact [facility] Patient Relations at [phone] or email us at [email or web address]         Additional Information About Your Visit        Care EveryWhere ID     This is your Care EveryWhere ID. This could be used by other organizations to access your [place] medical records  [id number]         Blood Pressure from Last 3 Encounters:   10/24/17 127/81   10/17/17 127/80   08/02/17 111/71    Weight from Last 3 Encounters:   10/17/17 77.1 kg (170 lb)   08/02/17 78.4 kg (172 lb 12.8 oz)   09/13/16 82.6 kg (182 lb)              We Performed the Following     OCT Optic Nerve RNFL Spectralis OU (both eyes)           Today's Medication Changes          These changes are accurate as of: 12/6/17  4:25 PM.  If you have any questions, ask your nurse or doctor.               These medicines have changed or have updated prescriptions.        Dose/Directions    warfarin 5 MG tablet   Commonly known as:  COUMADIN   This may have changed:  additional instructions   Used for:  Long term current use of anticoagulant therapy        Take 5 mg by mouth MWF and 7.5 mg ROW, or as directed per Coumadin clinic.   Quantity:  135 tablet   Refills:  0                Primary Care Provider Office Phone # Fax #    Pieter Ulloa [phone] [address] [place] [address]        Equal Access to Services     [facility]: Hadii allie Lund, waирина franco, carmenybmitch bergermamary pizano, alma walton . So [facility] [phone].    ATENCIÓN: Si habla español, tiene a myers disposición servicios gratuitos de asistencia lingüística. [facility] [phone].    We comply with applicable federal civil rights laws and [place] laws. We do not discriminate on the basis of race, color, national origin, age, disability, sex, sexual orientation, or gender identity.            Thank you!     Thank you for choosing EYE CLINIC  for your care. Our goal is always to provide you with excellent care. Hearing back from our patients is one way we can continue to improve our services. Please take a few minutes to complete the written survey that you may receive in the mail after your visit with us. Thank you!             Your Updated Medication List - Protect others around you: Learn how to safely use, store and throw away your medicines at www.disposemymeds.org.          This list is accurate as of: 12/6/17  4:25 PM.  Always use your most recent med list.                   Brand Name Dispense Instructions for use Diagnosis    ARICEPT 10 MG tablet   Generic drug:  donepezil     30 tablet    Take 1  tablet (10 mg) by mouth At Bedtime    Memory loss       diltiazem 120 MG 24 hr capsule    CARTIA XT    180 capsule    Take 1 capsule (120 mg) by mouth 2 times daily    Chronic atrial fibrillation (H)       diltiazem 240 MG 24 hr capsule    DILACOR XR    180 capsule    Take 1 capsule (240 mg) by mouth two times daily    Essential hypertension       IBUPROFEN PO           levothyroxine 50 MCG tablet    SYNTHROID/LEVOTHROID    90 tablet    Take 1 tablet (50 mcg) by mouth daily    Hypothyroidism, unspecified type       * pravastatin 10 MG tablet    PRAVACHOL    90 tablet    Take 1 tablet (10 mg) by mouth daily Take one 10 mg tablet and one 20 mg tablet to total 30 mg every evening    Mixed hyperlipidemia       * pravastatin 20 MG tablet    PRAVACHOL    90 tablet    Take 1 tablet (20 mg) by mouth daily Take one 10 mg tablet and one 20 mg tablet to total 30 mg every evening    Mixed hyperlipidemia       Travoprost 0.004 % Soln     5 mL    Apply 1 drop to eye At Bedtime 1 drop to both eyes at bedtime    Primary open angle glaucoma of both eyes, moderate stage       TYLENOL PO           warfarin 5 MG tablet    COUMADIN    135 tablet    Take 5 mg by mouth MWF and 7.5 mg ROW, or as directed per Coumadin clinic.    Long term current use of anticoagulant therapy       ZYRTEC ALLERGY PO      Take  by mouth daily as needed.        * Notice:  This list has 2 medication(s) that are the same as other medications prescribed for you. Read the directions carefully, and ask your doctor or other care provider to review them with you.

## 2017-12-06 NOTE — PROGRESS NOTES
Primary open angle glaucoma (POAG)  mod  Selected laser trabeculoplasty (SLT) left eye 8-1-14    HPI: Overall feels vision stable, no significant changes since last visit in 11/2016.  Reports good drop compliance.    Current eye meds:  Travatan at bedtime both eyes    OCT retinal nerve fiber layer stable both eyes      Assessment:  1. Primary open angle glaucoma (POAG), moderate.   S/p Selected laser trabeculoplasty (SLT) right eye 2007   S/p Selected laser trabeculoplasty (SLT) left eye Aug 2014    Using Tavatan at bedtime both eyes   Octopus 24-2 stable - fluctuations but overall stable both eyes    2. Cataract both eyes still 20/20    Monitor    3. Narrow angles    S/p periperal iridotomy both eyes - patent    Plan:  Visual fields today relatively stable again today, some fluctuation  IOPs good today on current regimen  Return in 6 months for 24-2 ovf. Goes to [place] for the winter  Continue Travatan at bedtime, both eyes    Attending Physician Attestation:  Complete documentation of historical and exam elements from today's encounter can be found in the full encounter summary report (not reduplicated in this progress note). I personally obtained the chief complaint(s) and history of present illness. I confirmed and edited asnecessary the review of systems, past medical/surgical history, family history, social history, and examination findings as documented by others; and I examined the patient myself. I personally reviewed the relevant tests, images, and reports as documented above. I formulated and edited as necessary the assessment and plan and discussed the findings and management plan with the patient and family.  - Kim Joel MD 4:12 PM 12/6/2017

## 2017-12-19 ENCOUNTER — ANTICOAGULATION THERAPY VISIT (OUTPATIENT)
Dept: NURSING | Facility: CLINIC | Age: 77
End: 2017-12-19
Payer: COMMERCIAL

## 2017-12-19 DIAGNOSIS — Z79.01 LONG-TERM (CURRENT) USE OF ANTICOAGULANTS: ICD-10-CM

## 2017-12-19 DIAGNOSIS — I48.0 PAROXYSMAL ATRIAL FIBRILLATION (H): ICD-10-CM

## 2017-12-19 LAB — INR POINT OF CARE: 2.8 (ref 0.86–1.14)

## 2017-12-19 PROCEDURE — 36416 COLLJ CAPILLARY BLOOD SPEC: CPT

## 2017-12-19 PROCEDURE — 99207 ZZC NO CHARGE NURSE ONLY: CPT

## 2017-12-19 PROCEDURE — 85610 PROTHROMBIN TIME: CPT | Mod: QW

## 2017-12-19 NOTE — PROGRESS NOTES
ANTICOAGULATION FOLLOW-UP CLINIC VISIT    Patient Name:  Fredrick Mehta  Date:  12/19/2017  Contact Type:  Face to Face    SUBJECTIVE:        OBJECTIVE    INR Protime   Date Value Ref Range Status   12/19/2017 2.8 (A) 0.86 - 1.14 Final       ASSESSMENT / PLAN  INR assessment THER    Recheck INR In: 4 WEEKS    INR Location Outside lab [place]     Anticoagulation Summary as of 12/19/2017     INR goal 2.0-3.0   Prior goal 2.0-2.5   Today's INR 2.8!   Maintenance plan 5 mg (5 mg x 1) on Mon, Thu; 7.5 mg (5 mg x 1.5) all other days   Full instructions 5 mg on Mon, Thu; 7.5 mg all other days   Weekly total 47.5 mg   No change documented Tiffanie Salomon RN   Plan last modified Jessica Coker RN (10/20/2017)   Next INR check 5/31/2018   Priority INR   Target end date     Indications   Atrial fibrillation (H) [I48.91]  Long-term (current) use of anticoagulants [Z79.01] [Z79.01]         Anticoagulation Episode Summary     INR check location [facility]    Preferred lab     Send INR reminders to [facility] ANTICOAGULATION    Comments       Anticoagulation Care Providers     Provider Role Specialty Phone number    Yobani Sarkar MD Referring Cardiology [phone]            See the Encounter Report to view Anticoagulation Flowsheet and Dosing Calendar (Go to Encounters tab in chart review, and find the Anticoagulation Therapy Visit)    Patient was here with his wife Rosalva.  They will be leaving January - May for [place].  Outside lab orders given to patient to bring with for INR draws.  Also, INR range was noted as 2.0-2.5 however, newest INR referral orders notes 2.0-3.0 by Dr. Yobani Sarkar Cardiologist.  INR chart updated to reflect newest Range.  Patient denies any history with internal or external bleeding. Dosing based on [facility] Protocol and Provider directed care plan.      Tiffanie Salomon, TEMO

## 2017-12-21 DIAGNOSIS — Z79.01 LONG TERM CURRENT USE OF ANTICOAGULANT THERAPY: ICD-10-CM

## 2017-12-21 RX ORDER — WARFARIN SODIUM 5 MG/1
TABLET ORAL
Qty: 135 TABLET | Refills: 0 | Status: SHIPPED | OUTPATIENT
Start: 2017-12-21 | End: 2018-03-12

## 2017-12-21 NOTE — TELEPHONE ENCOUNTER
Last office visit: 10/24/2017, no future appointment.    Prescription approved per [facility] Refill Protocol.

## 2017-12-26 DIAGNOSIS — I48.20 CHRONIC ATRIAL FIBRILLATION (H): ICD-10-CM

## 2017-12-27 RX ORDER — DILTIAZEM HYDROCHLORIDE 240 MG/1
240 CAPSULE, COATED, EXTENDED RELEASE ORAL 2 TIMES DAILY
Qty: 180 CAPSULE | Refills: 3
Start: 2017-12-27 | End: 2019-01-16

## 2018-01-15 LAB — INR PPP: 2.4

## 2018-01-16 ENCOUNTER — ANTICOAGULATION THERAPY VISIT (OUTPATIENT)
Dept: NURSING | Facility: CLINIC | Age: 78
End: 2018-01-16
Payer: COMMERCIAL

## 2018-01-16 DIAGNOSIS — I48.0 PAROXYSMAL ATRIAL FIBRILLATION (H): ICD-10-CM

## 2018-01-16 DIAGNOSIS — Z79.01 LONG-TERM (CURRENT) USE OF ANTICOAGULANTS: ICD-10-CM

## 2018-01-16 NOTE — MR AVS SNAPSHOT
Fredrickbrody Mehta   1/16/2018   Anticoagulation Therapy Visit    Description:  77 year old male   Provider:  Pieter Ulloa MD   Department:  [facility]           INR as of 1/16/2018     Today's INR 2.4 (1/15/2018)      Anticoagulation Summary as of 1/16/2018     INR goal 2.0-3.0   Today's INR 2.4 (1/15/2018)   Full instructions 5 mg on Mon, Thu; 7.5 mg all other days   Next INR check 1/30/2018    Indications   Atrial fibrillation (H) [I48.91]  Long-term (current) use of anticoagulants [Z79.01] [Z79.01]         Contact Numbers     Clinic Number:         January 2018 Details    Sun Mon Tue Wed Thu Fri Sat      1               2               3               4               5               6                 7               8               9               10               11               12               13                 14               15               16      7.5 mg   See details      17      7.5 mg         18      5 mg         19      7.5 mg         20      7.5 mg           21      7.5 mg         22      5 mg         23      7.5 mg         24      7.5 mg         25      5 mg         26      7.5 mg         27      7.5 mg           28      7.5 mg         29      5 mg         30            31                   Date Details   01/16 This INR check       Date of next INR:  1/30/2018         How to take your warfarin dose     To take:  5 mg Take 1 of the 5 mg tablets.    To take:  7.5 mg Take 1.5 of the 5 mg tablets.

## 2018-01-16 NOTE — PROGRESS NOTES
ANTICOAGULATION FOLLOW-UP CLINIC VISIT    Patient Name:  Fredrick Mehta  Date:  1/16/2018  Contact Type:  Telephone/ Attempted to reach patient on both phone numbers listed in chart. Left VM on home phone. Patient currently in [place] for winter months and having INR drawn through [facility]. Unable to get a hold of patient due to [place] phone number being invalid.     SUBJECTIVE:     Patient Findings     Comments Patient is having INR drawn through [facility]            OBJECTIVE    INR   Date Value Ref Range Status   01/15/2018 2.4  Final       ASSESSMENT / PLAN  INR assessment THER    Recheck INR In: 2 WEEKS    INR Location Outside lab      Anticoagulation Summary as of 1/16/2018     INR goal 2.0-3.0   Today's INR 2.4 (1/15/2018)   Maintenance plan 5 mg (5 mg x 1) on Mon, Thu; 7.5 mg (5 mg x 1.5) all other days   Full instructions 5 mg on Mon, Thu; 7.5 mg all other days   Weekly total 47.5 mg   No change documented Rachel Pretty RN   Plan last modified Jessica Coker RN (10/20/2017)   Next INR check 1/30/2018   Priority INR   Target end date     Indications   Atrial fibrillation (H) [I48.91]  Long-term (current) use of anticoagulants [Z79.01] [Z79.01]         Anticoagulation Episode Summary     INR check location [facility]    Preferred lab     Send INR reminders to [facility] ANTICOAG[facility]ATION    Comments Patient having INR drawn through [facility] [place] through May 2018.      Anticoagulation Care Providers     Provider Role Specialty Phone number    Yobani Sarkar MD Referring Cardiology [phone]            See the Encounter Report to view Anticoagulation Flowsheet and Dosing Calendar (Go to Encounters tab in chart review, and find the Anticoagulation Therapy Visit)    Dosage adjustment made based on physician directed care plan.      Rachel Pretty, RN

## 2018-01-18 ENCOUNTER — TELEPHONE (OUTPATIENT)
Dept: FAMILY MEDICINE | Facility: CLINIC | Age: 78
End: 2018-01-18

## 2018-01-18 NOTE — TELEPHONE ENCOUNTER
"Received fax from patient in [place], [place], stating he has not received results/recommendations for Warfarin and next INR date.   Message from patient requests info be called to [phone] \"new mobile number, until about April\".     Results, Warfarin dosing instructions, and next INR date (1/29/18) were left as voice message for patient on 1/15/18.  # [phone]  (same number as recommended by patient).   Also faxed to [employer] \"DEQ\" #[phone]    Tried to call same number now.  Message immediately \"We're sorry your call can not be completed as dialed.  Please check the number and dial again.\".      Will try again later.      Jessica RAYO RN,BSN        "

## 2018-01-19 NOTE — TELEPHONE ENCOUNTER
Reached spouse, Rosalva, on corrected phone number below.  Dosing instructions given; no change.  Recheck week of Feb 5.  Shwetha Escobar RN

## 2018-01-19 NOTE — TELEPHONE ENCOUNTER
Patient called clinic.    They realized they gave the wrong phone number!  So sorry!    The correct phone number to reach patient is:   [phone]  Please call asap with next dosing and date to check INR.  Please do not leave a message.

## 2018-02-09 ENCOUNTER — ANTICOAGULATION THERAPY VISIT (OUTPATIENT)
Dept: NURSING | Facility: CLINIC | Age: 78
End: 2018-02-09
Payer: COMMERCIAL

## 2018-02-09 DIAGNOSIS — Z79.01 LONG-TERM (CURRENT) USE OF ANTICOAGULANTS: ICD-10-CM

## 2018-02-09 DIAGNOSIS — I48.0 PAROXYSMAL ATRIAL FIBRILLATION (H): ICD-10-CM

## 2018-02-09 LAB — INR PPP: 2.6

## 2018-02-09 NOTE — MR AVS SNAPSHOT
Fredrick Mehta   2/9/2018   Anticoagulation Therapy Visit    Description:  77 year old male   Provider:  Pieter Ulloa MD   Department:  [facility]           INR as of 2/9/2018     Today's INR 2.6 (2/8/2018)      Anticoagulation Summary as of 2/9/2018     INR goal 2.0-3.0   Today's INR 2.6 (2/8/2018)   Full instructions 5 mg on Mon, Thu; 7.5 mg all other days   Next INR check 3/8/2018    Indications   Atrial fibrillation (H) [I48.91]  Long-term (current) use of anticoagulants [Z79.01] [Z79.01]         Contact Numbers     Clinic Number:         February 2018 Details    Sun Mon Tue Wed Thu Fri Sat         1               2               3                 4               5               6               7               8               9      7.5 mg   See details      10      7.5 mg           11      7.5 mg         12      5 mg         13      7.5 mg         14      7.5 mg         15      5 mg         16      7.5 mg         17      7.5 mg           18      7.5 mg         19      5 mg         20      7.5 mg         21      7.5 mg         22      5 mg         23      7.5 mg         24      7.5 mg           25      7.5 mg         26      5 mg         27      7.5 mg         28      7.5 mg             Date Details   02/09 This INR check               How to take your warfarin dose     To take:  5 mg Take 1 of the 5 mg tablets.    To take:  7.5 mg Take 1.5 of the 5 mg tablets.           March 2018 Details    Sun Mon Tue Wed Thu Fri Sat         1      5 mg         2      7.5 mg         3      7.5 mg           4      7.5 mg         5      5 mg         6      7.5 mg         7      7.5 mg         8            9               10                 11               12               13               14               15               16               17                 18               19               20               21               22               23               24                 25               26               27                28               29               30               31                Date Details   No additional details    Date of next INR:  3/8/2018         How to take your warfarin dose     To take:  5 mg Take 1 of the 5 mg tablets.    To take:  7.5 mg Take 1.5 of the 5 mg tablets.

## 2018-02-09 NOTE — PROGRESS NOTES
ANTICOAGULATION FOLLOW-UP CLINIC VISIT    Patient Name:  Fredrick Mehta  Date:  2/9/2018  Contact Type:  Telephone/ Left detailed VM for patient with dosing instruction and recheck date to have completed through [facility] out in [place].     SUBJECTIVE:        OBJECTIVE    INR   Date Value Ref Range Status   02/08/2018 2.6  Final       ASSESSMENT / PLAN  INR assessment THER    Recheck INR In: 4 WEEKS    INR Location Outside lab      Anticoagulation Summary as of 2/9/2018     INR goal 2.0-3.0   Today's INR 2.6 (2/8/2018)   Maintenance plan 5 mg (5 mg x 1) on Mon, Thu; 7.5 mg (5 mg x 1.5) all other days   Full instructions 5 mg on Mon, Thu; 7.5 mg all other days   Weekly total 47.5 mg   No change documented Rachel Pretty RN   Plan last modified Jessica Coker RN (10/20/2017)   Next INR check 3/8/2018   Priority INR   Target end date     Indications   Atrial fibrillation (H) [I48.91]  Long-term (current) use of anticoagulants [Z79.01] [Z79.01]         Anticoagulation Episode Summary     INR check location [facility]    Preferred lab     Send INR reminders to [facility] ANTICOAGULATION    Comments Patient having INR drawn through [facility] [place] through May 2018. Reach patient [phone].      Anticoagulation Care Providers     Provider Role Specialty Phone number    sharla, Yobani Paniagua MD Referring Cardiology [phone]            See the Encounter Report to view Anticoagulation Flowsheet and Dosing Calendar (Go to Encounters tab in chart review, and find the Anticoagulation Therapy Visit)    Dosage adjustment made based on physician directed care plan.  No changes.     Rachel Pretty RN

## 2018-03-08 ENCOUNTER — ANTICOAGULATION THERAPY VISIT (OUTPATIENT)
Dept: FAMILY MEDICINE | Facility: CLINIC | Age: 78
End: 2018-03-08
Payer: COMMERCIAL

## 2018-03-08 DIAGNOSIS — Z79.01 LONG-TERM (CURRENT) USE OF ANTICOAGULANTS: ICD-10-CM

## 2018-03-08 DIAGNOSIS — I48.0 PAROXYSMAL ATRIAL FIBRILLATION (H): ICD-10-CM

## 2018-03-08 LAB — INR PPP: 2.8

## 2018-03-08 PROCEDURE — 99207 ZZC NO CHARGE NURSE ONLY: CPT | Performed by: FAMILY MEDICINE

## 2018-03-08 NOTE — MR AVS SNAPSHOT
Fredrick Janine   3/8/2018   Anticoagulation Therapy Visit    Description:  77 year old male   Provider:  Pieter Ulloa MD   Department:  [facility]           INR as of 3/8/2018     Today's INR 2.8 (3/7/2018)      Anticoagulation Summary as of 3/8/2018     INR goal 2.0-3.0   Today's INR 2.8 (3/7/2018)   Full instructions 5 mg on Mon, Thu; 7.5 mg all other days   Next INR check 4/13/2018    Indications   Atrial fibrillation (H) [I48.91]  Long-term (current) use of anticoagulants [Z79.01] [Z79.01]         Your next Anticoagulation Clinic appointment(s)     Apr 13, 2018  2:30 PM CDT   Anticoagulation Visit with  ANTICOAGULATION CLINIC   [facility]    [address]  [place] [address]   [phone]              March 2018 Details    Sun Mon Tue Wed Thu Fri Sat         1               2               3                 4               5               6               7               8      5 mg   See details      9      7.5 mg         10      7.5 mg           11      7.5 mg         12      5 mg         13      7.5 mg         14      7.5 mg         15      5 mg         16      7.5 mg         17      7.5 mg           18      7.5 mg         19      5 mg         20      7.5 mg         21      7.5 mg         22      5 mg         23      7.5 mg         24      7.5 mg           25      7.5 mg         26      5 mg         27      7.5 mg         28      7.5 mg         29      5 mg         30      7.5 mg         31      7.5 mg          Date Details   03/08 This INR check               How to take your warfarin dose     To take:  5 mg Take 1 of the 5 mg tablets.    To take:  7.5 mg Take 1.5 of the 5 mg tablets.           April 2018 Details    Sun Mon Tue Wed Thu Fri Sat     1      7.5 mg         2      5 mg         3      7.5 mg         4      7.5 mg         5      5 mg         6      7.5 mg         7      7.5 mg           8      7.5 mg         9      5 mg         10       7.5 mg         11      7.5 mg         12      5 mg         13            14                 15               16               17               18               19               20               21                 22               23               24               25               26               27               28                 29               30                     Date Details   No additional details    Date of next INR:  4/13/2018         How to take your warfarin dose     To take:  5 mg Take 1 of the 5 mg tablets.    To take:  7.5 mg Take 1.5 of the 5 mg tablets.

## 2018-03-08 NOTE — PROGRESS NOTES
ANTICOAGULATION FOLLOW-UP CLINIC VISIT    Patient Name:  Fredrick Mehta  Date:  3/8/2018  Contact Type:  Face to Face    SUBJECTIVE:     Patient Findings     Positives No Problem Findings           OBJECTIVE    INR   Date Value Ref Range Status   03/07/2018 2.8  Final       ASSESSMENT / PLAN  INR assessment THER    Recheck INR In: 5 WEEKS    INR Location Outside lab      Anticoagulation Summary as of 3/8/2018     INR goal 2.0-3.0   Today's INR 2.8 (3/7/2018)   Maintenance plan 5 mg (5 mg x 1) on Mon, Thu; 7.5 mg (5 mg x 1.5) all other days   Full instructions 5 mg on Mon, Thu; 7.5 mg all other days   Weekly total 47.5 mg   No change documented Shalini Daniels RN   Plan last modified Jessica Coker RN (10/20/2017)   Next INR check 4/13/2018   Target end date     Indications   Atrial fibrillation (H) [I48.91]  Long-term (current) use of anticoagulants [Z79.01] [Z79.01]         Anticoagulation Episode Summary     INR check location [facility]    Preferred lab     Send INR reminders to [facility] ANTICOAGULATION    Comments Patient having INR drawn through [facility] through May 2018. Reach patient [phone].      Anticoagulation Care Providers     Provider Role Specialty Phone number    [facility], Yobani Paniagua MD Referring Cardiology [phone]            See the Encounter Report to view Anticoagulation Flowsheet and Dosing Calendar (Go to Encounters tab in chart review, and find the Anticoagulation Therapy Visit)    Dosage adjustment made based on physician directed care plan.  Next INR will be in Clinic when pt back from vacation.    Shalini Daniels RN

## 2018-03-10 DIAGNOSIS — E78.2 MIXED HYPERLIPIDEMIA: ICD-10-CM

## 2018-03-12 DIAGNOSIS — H40.1132 PRIMARY OPEN ANGLE GLAUCOMA OF BOTH EYES, MODERATE STAGE: ICD-10-CM

## 2018-03-12 DIAGNOSIS — Z79.01 LONG TERM CURRENT USE OF ANTICOAGULANT THERAPY: ICD-10-CM

## 2018-03-12 RX ORDER — WARFARIN SODIUM 5 MG/1
TABLET ORAL
Qty: 135 TABLET | Refills: 0 | Status: SHIPPED | OUTPATIENT
Start: 2018-03-12 | End: 2018-06-19

## 2018-03-12 RX ORDER — PRAVASTATIN SODIUM 10 MG
TABLET ORAL
Qty: 90 TABLET | Refills: 3 | Status: SHIPPED | OUTPATIENT
Start: 2018-03-12 | End: 2019-02-13

## 2018-03-12 NOTE — TELEPHONE ENCOUNTER
Last office visit: 10/24/2017, no future appointment.    INR's are UTD with coumadin clinic.    OK to refill    Marie Amador RN  March 12, 2018 1:47 PM

## 2018-04-13 ENCOUNTER — ANTICOAGULATION THERAPY VISIT (OUTPATIENT)
Dept: NURSING | Facility: CLINIC | Age: 78
End: 2018-04-13
Payer: COMMERCIAL

## 2018-04-13 DIAGNOSIS — Z79.01 LONG-TERM (CURRENT) USE OF ANTICOAGULANTS: ICD-10-CM

## 2018-04-13 LAB — INR POINT OF CARE: 2.9 (ref 0.86–1.14)

## 2018-04-13 PROCEDURE — 99207 ZZC NO CHARGE NURSE ONLY: CPT

## 2018-04-13 PROCEDURE — 85610 PROTHROMBIN TIME: CPT | Mod: QW

## 2018-04-13 PROCEDURE — 36416 COLLJ CAPILLARY BLOOD SPEC: CPT

## 2018-04-13 NOTE — MR AVS SNAPSHOT
Fredrick Mehta   4/13/2018 2:30 PM   Anticoagulation Therapy Visit    Description:  77 year old male   Provider:   ANTICOAGULATION CLINIC   Department:  [facility] Nurse           INR as of 4/13/2018     Today's INR 2.9      Anticoagulation Summary as of 4/13/2018     INR goal 2.0-3.0   Today's INR 2.9   Full instructions 5 mg on Mon, Thu; 7.5 mg all other days   Next INR check 5/24/2018    Indications   Atrial fibrillation (H) [I48.91]  Long-term (current) use of anticoagulants [Z79.01] [Z79.01]         Your next Anticoagulation Clinic appointment(s)     May 24, 2018  3:45 PM CDT   Anticoagulation Visit with  ANTICOAGULATION CLINIC   [facility]    [address]  [place] [address] [phone]              Contact Numbers     Clinic Number:         April 2018 Details    Sun Mon Tue Wed Thu Fri Sat     1               2               3               4               5               6               7                 8               9               10               11               12               13      7.5 mg   See details      14      7.5 mg           15      7.5 mg         16      5 mg         17      7.5 mg         18      7.5 mg         19      5 mg         20      7.5 mg         21      7.5 mg           22      7.5 mg         23      5 mg         24      7.5 mg         25      7.5 mg         26      5 mg         27      7.5 mg         28      7.5 mg           29      7.5 mg         30      5 mg               Date Details   04/13 This INR check               How to take your warfarin dose     To take:  5 mg Take 1 of the 5 mg tablets.    To take:  7.5 mg Take 1.5 of the 5 mg tablets.           May 2018 Details    Sun Mon Tue Wed Thu Fri Sat       1      7.5 mg         2      7.5 mg         3      5 mg         4      7.5 mg         5      7.5 mg           6      7.5 mg         7      5 mg         8      7.5 mg         9      7.5 mg         10      5 mg         11       7.5 mg         12      7.5 mg           13      7.5 mg         14      5 mg         15      7.5 mg         16      7.5 mg         17      5 mg         18      7.5 mg         19      7.5 mg           20      7.5 mg         21      5 mg         22      7.5 mg         23      7.5 mg         24            25               26                 27               28               29               30               31                  Date Details   No additional details    Date of next INR:  5/24/2018         How to take your warfarin dose     To take:  5 mg Take 1 of the 5 mg tablets.    To take:  7.5 mg Take 1.5 of the 5 mg tablets.

## 2018-04-13 NOTE — PROGRESS NOTES
ANTICOAGULATION FOLLOW-UP CLINIC VISIT    Patient Name:  Fredrick Mehta  Date:  4/13/2018  Contact Type:  Face to Face    SUBJECTIVE:     Patient Findings     Positives No Problem Findings           OBJECTIVE    INR Protime   Date Value Ref Range Status   04/13/2018 2.9 (A) 0.86 - 1.14 Final       ASSESSMENT / PLAN  INR assessment THER    Recheck INR In: 6 WEEKS    INR Location Clinic      Anticoagulation Summary as of 4/13/2018     INR goal 2.0-3.0   Today's INR 2.9   Maintenance plan 5 mg (5 mg x 1) on Mon, Thu; 7.5 mg (5 mg x 1.5) all other days   Full instructions 5 mg on Mon, Thu; 7.5 mg all other days   Weekly total 47.5 mg   No change documented Ashley Kinney RN   Plan last modified Jessica Coker RN (10/20/2017)   Next INR check 5/24/2018   Target end date     Indications   Atrial fibrillation (H) [I48.91]  Long-term (current) use of anticoagulants [Z79.01] [Z79.01]         Anticoagulation Episode Summary     INR check location [facility]    Preferred lab     Send INR reminders to  ANTICOAGULATION    Comments Patient having INR drawn through [employer] [place] through May 2018. Reach patient [phone].      Anticoagulation Care Providers     Provider Role Specialty Phone number    [facility] MD Referring Cardiology [phone]            See the Encounter Report to view Anticoagulation Flowsheet and Dosing Calendar (Go to Encounters tab in chart review, and find the Anticoagulation Therapy Visit)    Dosage adjustment made based on physician directed care plan.    Ashley Kinney RN

## 2018-05-15 ENCOUNTER — TRANSFERRED RECORDS (OUTPATIENT)
Dept: HEALTH INFORMATION MANAGEMENT | Facility: CLINIC | Age: 78
End: 2018-05-15

## 2018-05-24 ENCOUNTER — ANTICOAGULATION THERAPY VISIT (OUTPATIENT)
Dept: NURSING | Facility: CLINIC | Age: 78
End: 2018-05-24
Payer: COMMERCIAL

## 2018-05-24 DIAGNOSIS — I48.91 ATRIAL FIBRILLATION (H): ICD-10-CM

## 2018-05-24 DIAGNOSIS — Z79.01 LONG-TERM (CURRENT) USE OF ANTICOAGULANTS: ICD-10-CM

## 2018-05-24 LAB — INR POINT OF CARE: 2.8 (ref 0.86–1.14)

## 2018-05-24 PROCEDURE — 99207 ZZC NO CHARGE NURSE ONLY: CPT

## 2018-05-24 PROCEDURE — 36416 COLLJ CAPILLARY BLOOD SPEC: CPT

## 2018-05-24 PROCEDURE — 85610 PROTHROMBIN TIME: CPT | Mod: QW

## 2018-05-24 NOTE — PROGRESS NOTES
ANTICOAGULATION FOLLOW-UP CLINIC VISIT    Patient Name:  Fredrick Mehta  Date:  5/24/2018  Contact Type:  Face to Face    SUBJECTIVE:     Patient Findings     Positives No Problem Findings           OBJECTIVE    INR Protime   Date Value Ref Range Status   05/24/2018 2.8 (A) 0.86 - 1.14 Final       ASSESSMENT / PLAN  INR assessment THER    Recheck INR In: 6 WEEKS    INR Location Clinic      Anticoagulation Summary as of 5/24/2018     INR goal 2.0-3.0   Today's INR 2.8   Warfarin maintenance plan 5 mg (5 mg x 1) on Mon, Thu; 7.5 mg (5 mg x 1.5) all other days   Full warfarin instructions 5 mg on Mon, Thu; 7.5 mg all other days   Weekly warfarin total 47.5 mg   No change documented Jessica Coker RN   Plan last modified Jessica Coker RN (10/20/2017)   Next INR check 7/9/2018   Target end date     Indications   Atrial fibrillation (H) [I48.91]  Long-term (current) use of anticoagulants [Z79.01] [Z79.01]         Anticoagulation Episode Summary     INR check location [facility]    Preferred lab     Send INR reminders to  ANTICOAGULATION    Comments Patient having INR drawn through [employer] [place] through May 2018. Reach patient [phone].      Anticoagulation Care Providers     Provider Role Specialty Phone number    [facility], Yobani Paniagua MD Referring Cardiology [phone]    Pieter Ulloa MD [facility] [phone]            See the Encounter Report to view Anticoagulation Flowsheet and Dosing Calendar (Go to Encounters tab in chart review, and find the Anticoagulation Therapy Visit)    Dosage adjustment made based on physician directed care plan.    Jessica Coker RN

## 2018-05-24 NOTE — MR AVS SNAPSHOT
Fredrick Mehta   5/24/2018 3:45 PM   Anticoagulation Therapy Visit    Description:  77 year old male   Provider:   ANTICOAGULATION CLINIC   Department:   Nurse           INR as of 5/24/2018     Today's INR 2.8      Anticoagulation Summary as of 5/24/2018     INR goal 2.0-3.0   Today's INR 2.8   Full warfarin instructions 5 mg on Mon, Thu; 7.5 mg all other days   Next INR check 7/9/2018    Indications   Atrial fibrillation (H) [I48.91]  Long-term (current) use of anticoagulants [Z79.01] [Z79.01]         Your next Anticoagulation Clinic appointment(s)     Jul 09, 2018  3:30 PM CDT   Anticoagulation Visit with  ANTICOAGULATION CLINIC   [facility]    [address]  [place] [address]   [phone]              Contact Numbers     Clinic Number:         May 2018 Details    Sun Mon Tue Wed Thu Fri Sat       1               2               3               4               5                 6               7               8               9               10               11               12                 13               14               15               16               17               18               19                 20               21               22               23               24      5 mg   See details      25      7.5 mg         26      7.5 mg           27      7.5 mg         28      5 mg         29      7.5 mg         30      7.5 mg         31      5 mg            Date Details   05/24 This INR check               How to take your warfarin dose     To take:  5 mg Take 1 of the 5 mg tablets.    To take:  7.5 mg Take 1.5 of the 5 mg tablets.           June 2018 Details    Sun Mon Tue Wed Thu Fri Sat          1      7.5 mg         2      7.5 mg           3      7.5 mg         4      5 mg         5      7.5 mg         6      7.5 mg         7      5 mg         8      7.5 mg         9      7.5 mg           10      7.5 mg         11      5 mg         12      7.5 mg          13      7.5 mg         14      5 mg         15      7.5 mg         16      7.5 mg           17      7.5 mg         18      5 mg         19      7.5 mg         20      7.5 mg         21      5 mg         22      7.5 mg         23      7.5 mg           24      7.5 mg         25      5 mg         26      7.5 mg         27      7.5 mg         28      5 mg         29      7.5 mg         30      7.5 mg          Date Details   No additional details            How to take your warfarin dose     To take:  5 mg Take 1 of the 5 mg tablets.    To take:  7.5 mg Take 1.5 of the 5 mg tablets.           July 2018 Details    Sun Mon Tue Wed Thu Fri Sat     1      7.5 mg         2      5 mg         3      7.5 mg         4      7.5 mg         5      5 mg         6      7.5 mg         7      7.5 mg           8      7.5 mg         9            10               11               12               13               14                 15               16               17               18               19               20               21                 22               23               24               25               26               27               28                 29               30               31                    Date Details   No additional details    Date of next INR:  7/9/2018         How to take your warfarin dose     To take:  5 mg Take 1 of the 5 mg tablets.    To take:  7.5 mg Take 1.5 of the 5 mg tablets.

## 2018-06-06 ENCOUNTER — OFFICE VISIT (OUTPATIENT)
Dept: OPHTHALMOLOGY | Facility: CLINIC | Age: 78
End: 2018-06-06
Attending: OPHTHALMOLOGY
Payer: MEDICARE

## 2018-06-06 DIAGNOSIS — H40.1132 PRIMARY OPEN ANGLE GLAUCOMA OF BOTH EYES, MODERATE STAGE: Primary | ICD-10-CM

## 2018-06-06 PROCEDURE — 92083 EXTENDED VISUAL FIELD XM: CPT | Mod: ZF | Performed by: OPHTHALMOLOGY

## 2018-06-06 PROCEDURE — G0463 HOSPITAL OUTPT CLINIC VISIT: HCPCS | Mod: ZF

## 2018-06-06 ASSESSMENT — REFRACTION_WEARINGRX
OS_CYLINDER: +1.00
OD_SPHERE: +4.25
OS_SPHERE: +4.00
OD_CYLINDER: +1.00
OD_AXIS: 137
OS_AXIS: 097

## 2018-06-06 ASSESSMENT — CONF VISUAL FIELD
OS_NORMAL: 1
METHOD: COUNTING FINGERS
OD_NORMAL: 1

## 2018-06-06 ASSESSMENT — VISUAL ACUITY
CORRECTION_TYPE: GLASSES
OS_CC+: -2
OD_CC: 20/20
METHOD: SNELLEN - LINEAR
OD_CC+: -3
OS_CC: 20/20

## 2018-06-06 ASSESSMENT — SLIT LAMP EXAM - LIDS
COMMENTS: NORMAL
COMMENTS: NORMAL

## 2018-06-06 ASSESSMENT — TONOMETRY
IOP_METHOD: APPLANATION
OS_IOP_MMHG: 16
OD_IOP_MMHG: 14

## 2018-06-06 NOTE — NURSING NOTE
Chief Complaints and History of Present Illnesses   Patient presents with     Glaucoma Follow Up     6 month follow up both eyes.     HPI    Affected eye(s):  Both   Symptoms:     No floaters   No flashes   No redness   No tearing   No Dryness         Do you have eye pain now?:  No      Comments:  Pt states vision has been good, no changes.    Jennifer GROSS June 6, 2018 2:22 PM

## 2018-06-06 NOTE — MR AVS SNAPSHOT
After Visit Summary   6/6/2018    Fredrick Mehta    MRN: [id number]           Patient Information     Date Of Birth          1940        Visit Information        Provider Department      6/6/2018 1:45 PM Kim Joel MD [facility]        Today's Diagnoses     Primary open angle glaucoma of both eyes, moderate stage    -  1       Follow-ups after your visit        Follow-up notes from your care team     Return in about 6 months (around 12/6/2018) for OCT rnfl, dilation.      Your next 10 appointments already scheduled     Jul 09, 2018  3:30 PM CDT   Anticoagulation Visit with  ANTICOAGULATION CLINIC   [facility]    [address] [facility] [address]   [phone]            Aug 06, 2018  2:45 PM CDT   EP NEW with Joseluis Moscoso MD   [facility] ([facility])    [address] [place] [address] [address]  [facility] [address]   [phone] OPT 2            Dec 12, 2018  2:45 PM CST   RETURN GLAUCOMA with Kim Joel MD   [facility] ([facility])    [address]  [facility] [address]   [phone]              Who to contact     Please call your clinic at [phone] to:    Ask questions about your health    Make or cancel appointments    Discuss your medicines    Learn about your test results    Speak to your doctor            Additional Information About Your Visit        MyChart Information     [employer] is an electronic gateway that provides easy, online access to your medical records. With [employer], you can request a clinic appointment, read your test results, renew a prescription or communicate with your care team.     To sign up for [employer] visit the website at [email or web address]   You will be asked to enter the access code listed below, as well as some personal information. Please follow the directions to create your username  and password.     Your access code is: [email or web address]  Expires: 2018  6:31 AM     Your access code will  in 90 days. If you need help or a new code, please contact your [facility] or call [phone] for assistance.        Care EveryWhere ID     This is your Care EveryWhere ID. This could be used by other organizations to access your [facility] medical records  [id number]         Blood Pressure from Last 3 Encounters:   10/24/17 127/81   10/17/17 127/80   17 111/71    Weight from Last 3 Encounters:   10/17/17 77.1 kg (170 lb)   17 78.4 kg (172 lb 12.8 oz)   16 82.6 kg (182 lb)              We Performed the Following     OVF 24-2 Dynamic OU          Where to get your medicines      These medications were sent to [facility] [address] [place] [address] AT [address] [address]    Hours:  24-hours Phone:  [phone]     Travoprost 0.004 % Soln          Primary Care Provider Office Phone # Fax #    Pieter Ulloa [phone]       [address] [place] [address]        Equal Access to Services     JAYSHREE DILLARD AH: Hadii allie zuniga hadasho Soomaali, waaxda luqadaha, qaybta kaalmada adeegyada, alma morales haytemo walton . So [facility] [phone].    ATENCIÓN: Si habla español, tiene a myers disposición servicios gratuitos de asistencia lingüística. Llame al [phone].    We comply with applicable federal civil rights laws and [place] laws. We do not discriminate on the basis of race, color, national origin, age, disability, sex, sexual orientation, or gender identity.            Thank you!     Thank you for choosing EYE CLINIC  for your care. Our goal is always to provide you with excellent care. Hearing back from our patients is one way we can continue to improve our services. Please take a few minutes to complete the written survey that you may receive in the mail after  your visit with us. Thank you!             Your Updated Medication List - Protect others around you: Learn how to safely use, store and throw away your medicines at www.disposemymeds.org.          This list is accurate as of 6/6/18  3:36 PM.  Always use your most recent med list.                   Brand Name Dispense Instructions for use Diagnosis    ARICEPT 10 MG tablet   Generic drug:  donepezil     30 tablet    Take 1 tablet (10 mg) by mouth At Bedtime    Memory loss       diltiazem 240 MG 24 hr capsule    CARTIA XT    180 capsule    Take 1 capsule (240 mg) by mouth 2 times daily    Chronic atrial fibrillation (H)       IBUPROFEN PO           levothyroxine 50 MCG tablet    SYNTHROID/LEVOTHROID    90 tablet    Take 1 tablet (50 mcg) by mouth daily    Hypothyroidism, unspecified type       * pravastatin 20 MG tablet    PRAVACHOL    90 tablet    Take 1 tablet (20 mg) by mouth daily Take one 10 mg tablet and one 20 mg tablet to total 30 mg every evening    Mixed hyperlipidemia       * pravastatin 10 MG tablet    PRAVACHOL    90 tablet    TAKE 1 TABLET BY MOUTH EVERY EVENING. TAKE TOGETHER WITH 20MG.    Mixed hyperlipidemia       Travoprost 0.004 % Soln     7.5 mL    Place 1 drop into both eyes At Bedtime    Primary open angle glaucoma of both eyes, moderate stage       TYLENOL PO           warfarin 5 MG tablet    COUMADIN    135 tablet    Take 5 mg by mouth M and Th, and 7.5 mg ROW, or as directed per Coumadin clinic.    Long term current use of anticoagulant therapy       ZYRTEC ALLERGY PO      Take  by mouth daily as needed.        * Notice:  This list has 2 medication(s) that are the same as other medications prescribed for you. Read the directions carefully, and ask your doctor or other care provider to review them with you.

## 2018-06-06 NOTE — PROGRESS NOTES
Primary open angle glaucoma (POAG)  mod  Selected laser trabeculoplasty (SLT) left eye 8-1-14    HPI: Overall feels vision stable, no significant changes since last visit in 12/2017.  Reports good drop compliance.    Current eye meds:  Travatan at bedtime both eyes    OCT retinal nerve fiber layer stable both eyes      Assessment:  1. Primary open angle glaucoma (POAG), moderate.   S/p Selected laser trabeculoplasty (SLT) right eye 2007   S/p Selected laser trabeculoplasty (SLT) left eye Aug 2014    Using Travatan at bedtime both eyes   Octopus 24-2 stable - fluctuations but overall stable both eyes    2. Cataract both eyes still 20/20 but increasingly dense   Patient feels he sees well    3. Narrow angles    S/p periperal iridotomy both eyes - patent    Plan:  Visual fields today relatively stable again today, some fluctuation, worsening MD probably due to cataract and small pupil  IOPs good today on current regimen  Return in 6 months for OCT retinal nerve fiber layer and dilation. Goes to [place] for the winter  Continue Travatan at bedtime, both eyes    Jelani Trinh MD  Ophthalmology, PGY-3    Attending Physician Attestation:  Complete documentation of historical and exam elements from today's encounter can be found in the full encounter summary report (not reduplicated in this progress note). I personally obtained the chief complaint(s) and history of present illness. I confirmed and edited asnecessary the review of systems, past medical/surgical history, family history, social history, and examination findings as documented by others; and I examined the patient myself. I personally reviewed the relevant tests, images, and reports as documented above. I formulated and edited as necessary the assessment and plan and discussed the findings and management plan with the patient and family.  - Kim Joel MD 3:24 PM 6/6/2018

## 2018-06-18 DIAGNOSIS — E78.2 MIXED HYPERLIPIDEMIA: ICD-10-CM

## 2018-06-19 DIAGNOSIS — Z79.01 LONG TERM CURRENT USE OF ANTICOAGULANT THERAPY: ICD-10-CM

## 2018-06-19 NOTE — TELEPHONE ENCOUNTER
Last office visit:  10/24/2017, no future appointment.     Pt is up to date with INR's and followed by coumadin clinic    OK to refill    Marie Amador RN  June 21, 2018 1:37 PM

## 2018-06-20 RX ORDER — PRAVASTATIN SODIUM 20 MG
TABLET ORAL
Qty: 90 TABLET | Refills: 0 | Status: SHIPPED | OUTPATIENT
Start: 2018-06-20 | End: 2018-12-18

## 2018-06-21 RX ORDER — WARFARIN SODIUM 5 MG/1
TABLET ORAL
Qty: 135 TABLET | Refills: 0 | Status: SHIPPED | OUTPATIENT
Start: 2018-06-21 | End: 2018-09-12

## 2018-06-25 ENCOUNTER — HOSPITAL ENCOUNTER (OUTPATIENT)
Dept: OCCUPATIONAL THERAPY | Facility: CLINIC | Age: 78
Setting detail: THERAPIES SERIES
End: 2018-06-25
Attending: PSYCHIATRY & NEUROLOGY
Payer: MEDICARE

## 2018-06-25 PROCEDURE — 97535 SELF CARE MNGMENT TRAINING: CPT | Mod: GO | Performed by: OCCUPATIONAL THERAPIST

## 2018-06-25 PROCEDURE — 97165 OT EVAL LOW COMPLEX 30 MIN: CPT | Mod: GO | Performed by: OCCUPATIONAL THERAPIST

## 2018-06-25 PROCEDURE — G9168 MEMORY CURRENT STATUS: HCPCS | Mod: GO,CJ | Performed by: OCCUPATIONAL THERAPIST

## 2018-06-25 PROCEDURE — G9169 MEMORY GOAL STATUS: HCPCS | Mod: GO,CJ | Performed by: OCCUPATIONAL THERAPIST

## 2018-06-25 PROCEDURE — G9170 MEMORY D/C STATUS: HCPCS | Mod: GO,CJ | Performed by: OCCUPATIONAL THERAPIST

## 2018-06-25 PROCEDURE — 96125 COGNITIVE TEST BY HC PRO: CPT | Mod: GO | Performed by: OCCUPATIONAL THERAPIST

## 2018-06-25 PROCEDURE — 40000125 ZZHC STATISTIC OT OUTPT VISIT: Performed by: OCCUPATIONAL THERAPIST

## 2018-06-26 NOTE — PROGRESS NOTES
06/25/18 1500   Quick Adds   Quick Adds Certification   Type of Visit Initial Outpatient Occupational Therapy Evaluation   General Information   Start Of Care Date 06/25/18   Referring Physician Rita Winston MD ([facility])   Orders Evaluate and treat as indicated   Orders Date 06/11/18   Medical Diagnosis Alzheimers Disease, memory problems   Onset of Illness/Injury or Date of Surgery 06/11/18   Special Instructions CPT/Robin testing   Surgical/Medical History Reviewed Yes   Additional Occupational Profile Info/Pertinent History of Current Problem Patient diagnosed with Alzheimers 2 years ago.  Referred from neurology at [facility] for CPT.     Comments/Observations Wife Rosalva present.  Wife reports that he has lost his sense of direction.     Role/Living Environment   Current Community Support Family/friend caregiver   Patient role/Employment history Retired  ( at the , retired 2007)   Community/Avocational Activities Patient enjoys walking, reading, doing crosswords, and doing yardwork.   Patient has been home alone for 3 hours at a time.   Current Living Environment House   Prior Level - Transfers Independent   Prior Level - Ambulation Independent   Prior Level - ADLS Independent   Prior Responsibilities - IADL Yardwork;Driving   Patient/family Goals Statement None stated   Pain   Patient currently in pain No   Fall Risk Screen   Have you fallen 2 or more times in the past year? No   Have you fallen and had an injury in the past year? No   Fall screen comments Wife reprots that his balance isn't as good as it used to be.   Cognitive Status Examination   Orientation Person   Level of Consciousness Alert   Follows Commands and Answers Questions 100% of the time;Able to follow single-step instructions   Personal Safety and Judgment Impaired;At risk behaviors demonstrated  (999 (emergency number in [place]))   Memory Impaired   Memory Comments SLUMS: 20/30 (Score of 0-20 falls in the range  of dementia)   Attention Reports problems attending   Organization/Problem Solving Reports problems with organization   Cognitive Comment Looks to wife to answer questions.  Has large calandar at home.  Wife reports that he just recently fixed their  and drain without difficulty.   Visual Perception   Visual Perception No deficits were identified;Wears glasses   Planned Therapy Interventions   Planned Therapy Interventions Self care/Home management;Cognitive performance testing   Adult OT Eval Goals   OT Eval Goals (Adult) 1   OT Goal 1   Goal Identifier 1-CPT   Goal Description Patient to verbalize understanding of Cognitive Performance Test results and identify 3 strategies to increase patient's safety and independence in the home setting.   Target Date 06/25/18   Date Met 06/25/18   Clinical Impression   Criteria for Skilled Therapeutic Interventions Met Yes, treatment indicated   OT Diagnosis decreased independence and safety with ADL/IADL   Influenced by the following impairments cognitive changes   Assessment of Occupational Performance 1-3 Performance Deficits   Identified Performance Deficits affects ADL/IADL, household/community mobility, driving, recreational activities   Clinical Decision Making (Complexity) Low complexity   Therapy Frequency Evaluation and one treatment session   Risks and Benefits of Treatment have been explained. Yes   Patient, Family & other staff in agreement with plan of care Yes   Education Assessment   Barriers To Learning Cognitive   Preferred Learning Style Demonstration;Pictures/video   Therapy Certification   Certification date from 06/25/18   Certification date to 06/25/18   Total Evaluation Time   Total Evaluation Time 10

## 2018-06-26 NOTE — PROGRESS NOTES
Cognitive Performance Test    SUMMARY OF TEST:    The Cognitive Performance Test (CPT) is a standardized performance-based assessment to measure working memory/executive function processing capacities that underlie functional performance. Subtasks include common basic and instrumental activities of daily living (ADL/IADL) which are rated based on the manner in which patients respond to task demands of varying complexity. The total CPT score describes a level of functioning that indicates how information is processed, implications for functional activities, potential safety risks and a recommended level of supervision or assist based on cognitive function. The highest total score on this test is in the range of 5.6 to 5.8.    DATE OF TESTIN18    RESULTS OF TESTING:                                                                                         CPT Subtest Results    MEDBOX: 4.5/6 SHOP/GLOVES: 4.5/6 PHONE:    WASH:   TRAVEL:  TOAST:    DRESS:    TOTAL CPT SCORE:  36/39     Average CPT Score  5.1/5.6    INTERPRETATION OF TEST RESULTS:    Based on the Cognitive Performance Test, this patient scored at CPT Level 5.0  See CPT Levels reference below.    Summary of functional cognitive status:   Patient able to attend to all the subtests today, had mild impairment with attention to details with tasks.  He set up 2 of 4 medications correctly not paying attention to am/pm pill boxes, unable to correct with general cue.  Specific cues needed to correct.  Wife completes all medication management tasks for him.  He also did not pay attention with the shop task, needing cues to double check the correct amount of money given and purchase selected.      After the test, he was unable to recall any of the 7 subtests he had just completed.  Significant STM deficits but it did not affect his working memory.    Factors affecting performance:  Impaired vision  Impaired mobility     Recommendations:     Assist for ADL/IADL:  Shopping, Finances and Medication management  Supervision for ADL/IADL:  Meal preparation and Driving  Supervision in living setting:  Weekly checks                                                       TIME ADMINISTERING TEST: 45    TIME FOR INTERPRETATION AND PREPARATION OF REPORT: 10    TOTAL TIME: 55      CPT Levels Reference:    Patient's Average CPT Score:  5.0                                                                                                                                                  Individual scores range along a continuum as outlined below.  In addition to cognitive status, other factors may affect safety in a home environment.  Please refer to specific recommendations for this patient.    ___5.6-5.8  Normal functioning (absence of cognitive-functional disability).  Independent in managing personal affairs, monitors and directs own behavior.  Uses complex information to carry out daily activities with safety and accuracy.    Proficient with instrumental activities of daily living (IADL) and learning new activity.  Problems are anticipated, errors are avoided, and consequences of actions are considered.      _X__5.0   Mild cognitive-functional disability; deficits in working memory and executive thought processes. Difficulty using complex information. Problems may be observed with recent memory, judgment, reasoning and planning ahead. May be impulsive or have difficulty anticipating consequences.  Safety:  May require assistance to plan ahead; or to manage complex medication schedules, appointments or finances.  Hazardous activities may need to be monitored or limited.  ADL:  Mild functional decline.  Able to complete basic self-care and routine household tasks.  May have difficulty with complex daily tasks such as reading, writing, meal preparation, shopping or driving.   Learns through hands on teaching. Self-centered behavior or difficulty considering the needs  "of others may be seen related to trouble seeing the  whole picture\". Can appear disorganized or uninhibited.    ___4.5  Mild to moderate cognitive-functional disability. Significant deficits in working memory and executive thought processes. Judgment, reasoning and planning show obvious impairment.  Distractible with inability to shift attention/actions given competing stimuli.  Difficulty with problem solving and managing details. Complex daily tasks performed with inconsistency, difficulty, or error.     Safety:  Medications should be monitored, stove use may require supervision, and driving ability may be affected.  Impaired safety awareness with inability to anticipate potential problems.  May not recognize or respond to emergent situations. Requires frequent check-in support.   ADL:  Mild difficulty with simple everyday self-care tasks. Benefits from structured, routine activity.  Will likely need reminders to complete tasks outside of the routine. Requires assistance with planning and IADL tasks like shopping and finances. Learns concrete tasks through repetition, but performance may not generalize. Tends to be impulsive with poor insight. Self centered behavior or inability to consider the needs of others is common.    ___4.0  Moderate cognitive-functional disability; abstract to concrete thought processes. Working memory and executive function impairments are obvious. Difficulty with planning and problem solving.  Behavior is goal-directed, but unable to follow multi-step directions, is easily distracted, and may not recognize mistakes.  Inability to anticipate hazards or understand precautions.  Safety:  Recommend 24-hour supervision for safety. Supervision needed for medication management and for hazardous activities. May not be able to follow a restricted diet. Can get lost in unfamiliar surroundings. Generally, persons functioning at level 4 should not be driving.   ADL:  Some decline in quality or " frequency of ADL.  [place] enhanced by use of a routine, simple concrete directions, and caregiver set-up of needed items. Complex tasks such as money or home management typically requires assistance.  Relies heavily on vision to guide behavior; will ignore objects/hazards not in plain sight and can be distracted by irrelevant objects. Often has poor insight.  Able to carry out social conversation and may verbally  cover  for deficits leading caregivers to believe they are capable of functioning independently.       ___3.5  Moderate cognitive-functional disability; increased cues needed for task completion. Aware of concrete task steps but needs prompting or cues to initiate and complete simple tasks. Attention span is limited, simple directions may need to be repeated, and re-focus to a topic or task may be required.  Safety:  24-hour supervision required for safety and for assistance with daily tasks. Assistance required with medications, and access to medication should be limited. Meals, nutrition and dietary restrictions need to be monitored.  All hazardous activities should be restricted or supervised. Should not drive. Prone to wandering and can become lost.  ADL:  Moderate functional decline. Familiar tasks usually requires set-up of supplies and directions to complete steps. May need objects handed to them for task initiation. Function best with a set schedule in familiar surroundings with familiar people. All complex tasks must be done by others. Vocabulary is diminished and speech often unfocused.

## 2018-06-26 NOTE — PROGRESS NOTES
[facility]          OUTPATIENT OCCUPATIONAL THERAPY  EVALUATION  PLAN OF TREATMENT FOR OUTPATIENT REHABILITATION  (COMPLETE FOR INITIAL CLAIMS ONLY)  Patient's Last Name, First Name, M.I.  YOB: 1940  Fredrick Mehta                           Provider's Name  [facility] Medical Record No.  [id number]                               Onset Date:     06/11/18   Start of Care Date:     06/25/18   Type:     ___PT   _X_OT   ___SLP Medical Diagnosis:     Alzheimers Disease, memory problems                          OT Diagnosis:     decreased independence and safety with ADL/IADL Visits from SOC:  1   _________________________________________________________________________________  Plan of Treatment/Functional Goals:  Self care/Home management, Cognitive performance testing     Goals  Goal Identifier: 1-CPT  Goal Description: Patient to verbalize understanding of Cognitive Performance Test results and identify 3 strategies to increase patient's safety and independence in the home setting.  Target Date: 06/25/18  Date Met: 06/25/18             Therapy Frequency: Evaluation and one treatment session        JOELLEN Pastrana/DOMO         I CERTIFY THE NEED FOR THESE SERVICES FURNISHED UNDER        THIS PLAN OF TREATMENT AND WHILE UNDER MY CARE .             Physician Signature               Date    X_____________________________________________________                       ,    Certification date from: 06/25/18, Certification date to: 06/25/18               Referring Physician: Rita Winston MD ([facility])     Initial Assessment        See Epic Evaluation      Start Of Care Date: 06/25/18

## 2018-07-09 ENCOUNTER — ANTICOAGULATION THERAPY VISIT (OUTPATIENT)
Dept: NURSING | Facility: CLINIC | Age: 78
End: 2018-07-09
Payer: COMMERCIAL

## 2018-07-09 DIAGNOSIS — Z79.01 LONG-TERM (CURRENT) USE OF ANTICOAGULANTS: ICD-10-CM

## 2018-07-09 DIAGNOSIS — I48.91 ATRIAL FIBRILLATION (H): ICD-10-CM

## 2018-07-09 LAB — INR POINT OF CARE: 2.2 (ref 0.86–1.14)

## 2018-07-09 PROCEDURE — 36416 COLLJ CAPILLARY BLOOD SPEC: CPT

## 2018-07-09 PROCEDURE — 85610 PROTHROMBIN TIME: CPT | Mod: QW

## 2018-07-09 PROCEDURE — 99207 ZZC NO CHARGE NURSE ONLY: CPT

## 2018-07-09 NOTE — PROGRESS NOTES
ANTICOAGULATION FOLLOW-UP CLINIC VISIT    Patient Name:  Fredrick Mehta  Date:  7/9/2018  Contact Type:  Face to Face    SUBJECTIVE:     Patient Findings     Positives Change in medications (memantine started about a month ago - no noted interactions with warfarin)           OBJECTIVE    INR Protime   Date Value Ref Range Status   07/09/2018 2.2 (A) 0.86 - 1.14 Final       ASSESSMENT / PLAN  INR assessment THER    Recheck INR In: 6 WEEKS    INR Location Clinic      Anticoagulation Summary as of 7/9/2018     INR goal 2.0-3.0   Today's INR 2.2   Warfarin maintenance plan 5 mg (5 mg x 1) on Mon, Thu; 7.5 mg (5 mg x 1.5) all other days   Full warfarin instructions 5 mg on Mon, Thu; 7.5 mg all other days   Weekly warfarin total 47.5 mg   No change documented Kalyn Molina RN   Plan last modified Jessica Coker RN (10/20/2017)   Next INR check 8/21/2018   Target end date     Indications   Atrial fibrillation (H) [I48.91]  Long-term (current) use of anticoagulants [Z79.01] [Z79.01]         Anticoagulation Episode Summary     INR check location [facility]    Preferred lab     Send INR reminders to [facility]    Comments Patient having INR drawn through [facility] through May 2018. Reach patient [phone].      Anticoagulation Care Providers     Provider Role Specialty Phone number    Yobani Sarkar MD Referring Cardiology [phone]    Pieter Ulloa MD Responsible Family Practice [phone]            See the Encounter Report to view Anticoagulation Flowsheet and Dosing Calendar (Go to Encounters tab in chart review, and find the Anticoagulation Therapy Visit)    No changes in diet. No missed/extra warfarin doses. Memantine started about 1 month ago (no noted interaction with warfarin). No unusual bruising/bleeding or other changes. Pt will continue same warfarin dose and recheck INR in 6 weeks.     Pt aware if signs of clotting (pain, tenderness, swelling, color change  in leg or arm, SOB) and bleeding occur (blood in stool, urine, large bruising, bleeding gums, nosebleeds) to have INR check sooner. If sx severe report to ER or concerned for stroke call 911. If general questions or concerns arise, call clinic.    Kalyn Molina RN

## 2018-07-09 NOTE — MR AVS SNAPSHOT
Fredrickbrody Mehta   7/9/2018 3:30 PM   Anticoagulation Therapy Visit    Description:  77 year old male   Provider:   ANTICOAGULATION CLINIC   Department:   Nurse           INR as of 7/9/2018     Today's INR 2.2      Anticoagulation Summary as of 7/9/2018     INR goal 2.0-3.0   Today's INR 2.2   Full warfarin instructions 5 mg on Mon, Thu; 7.5 mg all other days   Next INR check 8/21/2018    Indications   Atrial fibrillation (H) [I48.91]  Long-term (current) use of anticoagulants [Z79.01] [Z79.01]         Your next Anticoagulation Clinic appointment(s)     Jul 09, 2018  3:30 PM CDT   Anticoagulation Visit with  ANTICOAGULATION CLINIC   [facility]    [address]  [place] [address]   [phone]            Aug 21, 2018  3:00 PM CDT   Anticoagulation Visit with  ANTICOAGULATION CLINIC   [facility] ([facility])    [address]  [place] [address]   [phone]              Contact Numbers     Clinic Number:         July 2018 Details    Sun Mon Tue Wed Thu Fri Sat     1               2               3               4               5               6               7                 8               9      5 mg   See details      10      7.5 mg         11      7.5 mg         12      5 mg         13      7.5 mg         14      7.5 mg           15      7.5 mg         16      5 mg         17      7.5 mg         18      7.5 mg         19      5 mg         20      7.5 mg         21      7.5 mg           22      7.5 mg         23      5 mg         24      7.5 mg         25      7.5 mg         26      5 mg         27      7.5 mg         28      7.5 mg           29      7.5 mg         30      5 mg         31      7.5 mg              Date Details   07/09 This INR check               How to take your warfarin dose     To take:  5 mg Take 1 of the 5 mg tablets.    To take:  7.5 mg Take 1.5 of the 5 mg tablets.           August 2018 Details    Sun Mon Tue  Wed Thu Fri Sat        1      7.5 mg         2      5 mg         3      7.5 mg         4      7.5 mg           5      7.5 mg         6      5 mg         7      7.5 mg         8      7.5 mg         9      5 mg         10      7.5 mg         11      7.5 mg           12      7.5 mg         13      5 mg         14      7.5 mg         15      7.5 mg         16      5 mg         17      7.5 mg         18      7.5 mg           19      7.5 mg         20      5 mg         21            22               23               24               25                 26               27               28               29               30               31                 Date Details   No additional details    Date of next INR:  8/21/2018         How to take your warfarin dose     To take:  5 mg Take 1 of the 5 mg tablets.    To take:  7.5 mg Take 1.5 of the 5 mg tablets.

## 2018-07-14 DIAGNOSIS — E78.2 MIXED HYPERLIPIDEMIA: ICD-10-CM

## 2018-07-16 NOTE — TELEPHONE ENCOUNTER
[facility]    Phone Message    May a detailed message be left on voicemail: yes    Reason for Call: Medication Refill Request    Has the patient contacted the pharmacy for the refill? Yes   Name of medication being requested: Pt wife calling asking for Medication refill of Rx. pravastatin (PRAVACHOL) 20 MG tablet. Per pt wife they are going out of town tomorrow morning and would like to  the script today. Please call pt wife back with status.   Provider who prescribed the medication: Dr. Sarkar  Pharmacy: [facility] [address] [place] [address] AT [facility]  Date medication is needed: 7/16/18         Action Taken: Message routed to:  [facility] ([facility] Cardiology

## 2018-07-18 RX ORDER — PRAVASTATIN SODIUM 20 MG
TABLET ORAL
Qty: 90 TABLET | Refills: 0 | Status: SHIPPED | OUTPATIENT
Start: 2018-07-18 | End: 2018-08-06

## 2018-08-02 ENCOUNTER — RADIANT APPOINTMENT (OUTPATIENT)
Dept: GENERAL RADIOLOGY | Facility: CLINIC | Age: 78
End: 2018-08-02
Attending: FAMILY MEDICINE
Payer: COMMERCIAL

## 2018-08-02 ENCOUNTER — OFFICE VISIT (OUTPATIENT)
Dept: ORTHOPEDICS | Facility: CLINIC | Age: 78
End: 2018-08-02
Payer: COMMERCIAL

## 2018-08-02 VITALS — DIASTOLIC BLOOD PRESSURE: 66 MMHG | SYSTOLIC BLOOD PRESSURE: 116 MMHG | HEIGHT: 71 IN | HEART RATE: 60 BPM

## 2018-08-02 DIAGNOSIS — M25.572 ACUTE LEFT ANKLE PAIN: ICD-10-CM

## 2018-08-02 DIAGNOSIS — M25.572 ACUTE LEFT ANKLE PAIN: Primary | ICD-10-CM

## 2018-08-02 RX ORDER — MEMANTINE HYDROCHLORIDE 5 MG/1
10 TABLET ORAL
Refills: 1 | COMMUNITY
Start: 2018-07-20 | End: 2023-08-01

## 2018-08-02 NOTE — PROGRESS NOTES
"[facility] Walk[facility] Note      Patient is a 77 year old male who presents to the office today with wife for: left ankle pain and swelling.  Twisted right ankle 5 days ago.  Did not have significant pain at the time of the injury or the day after.  However, 2 days after the injury began to notice increase pain and swelling in the foot.  Has also had increasing bruising.  Localizes to dorsal aspect of foot and lateral ankle.  Is able to stand without pain but worse pain with walking.  No pain at rest.  No prior history of significant ankle sprain or trauma.  Denies any tingling or numbness.      Past Medical History, Current Medications, and Allergies are reviewed in the electronic medical record as appropriate.     ROS: Pertinent items are noted in HPI.  Constitutional: negative for fevers, chills and malaise  Cardiovascular: negative for dyspnea, fatigue, lower extremity edema  Integument/breast: negative for rash, skin lesion(s) and skin color change  Neurological: negative for paresthesia and weakness      EXAM:/66  Pulse 60  Ht 5' 11\" (1.803 m)    The patient is alert in no acute distress, pleasant and conversational.    Gait: antalgic gait    left foot  Skin is intact, no erythema or ecchymosis.  Moderate soft tissue swelling over the dorsal foot and lateral ankle.  Ecchymosis over the left lateral heel.  +2/4 dorsalis pedis pulse, sensation is grossly intact throughout foot and ankle.    AROM: Globally restricted because of swelling but only minor discomfort with dorsiflexion and eversion.    Strength testing: dorsiflexion: 5/5, plantarflexion: 5/5, inversion: 5/5, eversion: 5/5, compared to the contralateral ankle  Patient is able to flex and extend toes against resistance      Mild TTP of ATFL and diffusely over the dorsal midfoot.  No tenderness to palpation of the medial or lateral malleolus.  No tenderness to palpation of the PTFL or CFL.  No tenderness palpation of the " peroneal tendon, posterior tibialis tendon or Achilles tendon    Calf is soft and nontender, no tenderness to palpation of the Lisfranc joint, no tenderness to palpation of the base of fifth metatarsal.      Imaging: xrays of left ankle performed and reviewed independently demonstrating no obvious acute change. There is a calcified fragment over the lateral ankle that appears chronic but given the distribution of his current pain and symptoms, may be acute.     Assessment: Patient is a 77 year old male with left ankle pain after injury earlier this week. Suspect mostly soft tissue injury, but given findings above, fracture possible.     Recommendations:   Reviewed imaging and assessment with patient in detail.   Recommended conservative treatment with two weeks in a boot.   Ice and elevate as much as possible.   Follow up in two weeks for re-eval. Possibly reimaging based on symptoms at that time.       Josh Campos MD

## 2018-08-02 NOTE — MR AVS SNAPSHOT
"              After Visit Summary   8/2/2018    Fredrick Mehta    MRN: [id number]           Patient Information     Date Of Birth          1940        Visit Information        Provider Department      8/2/2018 2:30 PM Josh Campos MD  [facility]        Today's Diagnoses     Acute left ankle pain    -  1       Follow-ups after your visit        Your next 10 appointments already scheduled     Aug 06, 2018  2:45 PM CDT   EP NEW with Joseluis Moscoso MD   [facility] ([facility])    [address]  [facility] [address]   [phone] OPT 2            Aug 17, 2018  1:00 PM CDT   Return Walk In Ortho with Josh Campos MD   [facility] ([facility])    [address]  4th [address]  [place] [facility] [id number] [phone]            Aug 21, 2018  3:00 PM CDT   Anticoagulation Visit with  ANTICOAGULATION CLINIC   [facility] ([facility]    [address] [address]   [phone]            Dec 12, 2018  2:45 PM CST   RETURN GLAUCOMA with Kim Joel MD   [facility] ([facility])    [address]  [place] [facility] [id number] [phone]              Who to contact     Please call your clinic at [phone] to:    Ask questions about your health    Make or cancel appointments    Discuss your medicines    Learn about your test results    Speak to your doctor            Additional Information About Your Visit        Care EveryWhere ID     This is your Care EveryWhere ID. This could be used by other organizations to access your [place] medical records  [id number]        Your Vitals Were     Pulse Height                60 1.803 m (5' 11\")           Blood Pressure from Last 3 Encounters:   08/02/18 116/66   10/24/17 127/81   10/17/17 " 127/80    Weight from Last 3 Encounters:   10/17/17 77.1 kg (170 lb)   08/02/17 78.4 kg (172 lb 12.8 oz)   09/13/16 82.6 kg (182 lb)               Primary Care Provider Office Phone # Fax #    Pieter Ulloa [phone]        [address] [place] [address]        Equal Access to Services     JAYSHREE DILLARD : Hadii aad ku hadasho Soomaali, waaxda luqadaha, qaybta kaalmada adeegyada, waxay idiin hayaan adeeg eladio lawilder . So [facility] [phone].    ATENCIÓN: Si habla espreg, tiene a myers disposición servicios gratuitos de asistencia lingüística. Vimalame al [phone].    We comply with applicable federal civil rights laws and [place] laws. We do not discriminate on the basis of race, color, national origin, age, disability, sex, sexual orientation, or gender identity.            Thank you!     Thank you for choosing [facility]  for your care. Our goal is always to provide you with excellent care. Hearing back from our patients is one way we can continue to improve our services. Please take a few minutes to complete the written survey that you may receive in the mail after your visit with us. Thank you!             Your Updated Medication List - Protect others around you: Learn how to safely use, store and throw away your medicines at www.disposemymeds.org.          This list is accurate as of 8/2/18  3:52 PM.  Always use your most recent med list.                   Brand Name Dispense Instructions for use Diagnosis    ARICEPT 10 MG tablet   Generic drug:  donepezil     30 tablet    Take 1 tablet (10 mg) by mouth At Bedtime    Memory loss       diltiazem 240 MG 24 hr capsule    CARTIA XT    180 capsule    Take 1 capsule (240 mg) by mouth 2 times daily    Chronic atrial fibrillation (H)       IBUPROFEN PO           levothyroxine 50 MCG tablet    SYNTHROID/LEVOTHROID    90 tablet    Take 1 tablet (50 mcg) by mouth daily    Hypothyroidism, unspecified  type       memantine 5 MG tablet    NAMENDA          * pravastatin 10 MG tablet    PRAVACHOL    90 tablet    TAKE 1 TABLET BY MOUTH EVERY EVENING. TAKE TOGETHER WITH 20MG.    Mixed hyperlipidemia       * pravastatin 20 MG tablet    PRAVACHOL    90 tablet    TAKE ONE TABLET BY MOUTH EVERY EVENING. TAKE TOGETHER WITH 10MG.    Mixed hyperlipidemia       * pravastatin 20 MG tablet    PRAVACHOL    90 tablet    TAKE ONE TABLET BY MOUTH EVERY EVENING. TAKE TOGETHER WITH 10MG.    Mixed hyperlipidemia       Travoprost 0.004 % Soln     7.5 mL    Place 1 drop into both eyes At Bedtime    Primary open angle glaucoma of both eyes, moderate stage       TYLENOL PO           warfarin 5 MG tablet    COUMADIN    135 tablet    Take 5 mg by mouth M and Th, and 7.5 mg ROW, or as directed per Coumadin clinic.    Long term current use of anticoagulant therapy       ZYRTEC ALLERGY PO      Take  by mouth daily as needed.        * Notice:  This list has 3 medication(s) that are the same as other medications prescribed for you. Read the directions carefully, and ask your doctor or other care provider to review them with you.

## 2018-08-02 NOTE — LETTER
8/2/2018       RE: Fredrick Mehta  [address]  [facility] [address]     Dear Colleague,    Thank you for referring your patient, Fredrick Mehta, to the [facility] SPORTS AND ORTHOPAEDIC WALK IN CLINIC at [facility]. Please see a copy of my visit note below.          SPORTS & ORTHOPEDIC WALK-IN VISIT 8/2/2018    Primary Care Physician: Dr. Ulloa    Twisted ankle on 7/28, started getting worse on 7/31. Seems to be getting better again but PCP is worried about a clot due to being on warfarin. Swollen and bruised.     Reason for visit:     What part of your body is injured / painful?  left ankle    What caused the injury /pain? twisted    How long ago did your injury occur or pain begin? 7/28    What are your most bothersome symptoms? Pain and Swelling and numbness    How would you characterize your symptom?  numb    What makes your symptoms better? Ibuprofen and tylenol    What makes your symptoms worse? Walking    Have you been previously seen for this problem? No    Medical History:    Any recent changes to your medical history? No    Any new medication prescribed since last visit? No    Have you had surgery on this body part before? No    Social History:    Occupation: Retired    Handedness: Right    Exercise:     Review of Systems:    Do you have fever, chills, weight loss? No    Do you have any vision problems? No    Do you have any chest pain or edema? No    Do you have any shortness of breath or wheezing?  No    Do you have stomach problems? No    Do you have any numbness or focal weakness? No    Do you have diabetes? No    Do you have problems with bleeding or clotting? Yes, on warfarin    Do you have an rashes or other skin lesions? Yes, eczema            [facility] Sports and Orthopedic Walk-in Clinic Note      Patient is a 77 year old male who presents to the office today with wife for: left ankle pain and swelling.  Twisted right ankle 5 days ago.  Did not  "have significant pain at the time of the injury or the day after.  However, 2 days after the injury began to notice increase pain and swelling in the foot.  Has also had increasing bruising.  Localizes to dorsal aspect of foot and lateral ankle.  Is able to stand without pain but worse pain with walking.  No pain at rest.  No prior history of significant ankle sprain or trauma.  Denies any tingling or numbness.      Past Medical History, Current Medications, and Allergies are reviewed in the electronic medical record as appropriate.     ROS: Pertinent items are noted in HPI.  Constitutional: negative for fevers, chills and malaise  Cardiovascular: negative for dyspnea, fatigue, lower extremity edema  Integument/breast: negative for rash, skin lesion(s) and skin color change  Neurological: negative for paresthesia and weakness      EXAM:/66  Pulse 60  Ht 5' 11\" (1.803 m)    The patient is alert in no acute distress, pleasant and conversational.    Gait: antalgic gait    left foot  Skin is intact, no erythema or ecchymosis.  Moderate soft tissue swelling over the dorsal foot and lateral ankle.  Ecchymosis over the left lateral heel.  +2/4 dorsalis pedis pulse, sensation is grossly intact throughout foot and ankle.    AROM: Globally restricted because of swelling but only minor discomfort with dorsiflexion and eversion.    Strength testing: dorsiflexion: 5/5, plantarflexion: 5/5, inversion: 5/5, eversion: 5/5, compared to the contralateral ankle  Patient is able to flex and extend toes against resistance      Mild TTP of ATFL and diffusely over the dorsal midfoot.  No tenderness to palpation of the medial or lateral malleolus.  No tenderness to palpation of the PTFL or CFL.  No tenderness palpation of the peroneal tendon, posterior tibialis tendon or Achilles tendon    Calf is soft and nontender, no tenderness to palpation of the Lisfranc joint, no tenderness to palpation of the base of fifth " metatarsal.      Imaging: xrays of left ankle performed and reviewed independently demonstrating no obvious acute change. There is a calcified fragment over the lateral ankle that appears chronic but given the distribution of his current pain and symptoms, may be acute.     Assessment: Patient is a 77 year old male with left ankle pain after injury earlier this week. Suspect mostly soft tissue injury, but given findings above, fracture possible.     Recommendations:   Reviewed imaging and assessment with patient in detail.   Recommended conservative treatment with two weeks in a boot.   Ice and elevate as much as possible.   Follow up in two weeks for re-eval. Possibly reimaging based on symptoms at that time.       Josh Campos MD

## 2018-08-02 NOTE — PROGRESS NOTES
SPORTS & ORTHOPEDIC WALK-IN VISIT 8/2/2018    Primary Care Physician: Dr. Ulloa    Twisted ankle on 7/28, started getting worse on 7/31. Seems to be getting better again but PCP is worried about a clot due to being on warfarin. Swollen and bruised.     Reason for visit:     What part of your body is injured / painful?  left ankle    What caused the injury /pain? twisted    How long ago did your injury occur or pain begin? 7/28    What are your most bothersome symptoms? Pain and Swelling and numbness    How would you characterize your symptom?  numb    What makes your symptoms better? Ibuprofen and tylenol    What makes your symptoms worse? Walking    Have you been previously seen for this problem? No    Medical History:    Any recent changes to your medical history? No    Any new medication prescribed since last visit? No    Have you had surgery on this body part before? No    Social History:    Occupation: Retired    Handedness: Right    Exercise:     Review of Systems:    Do you have fever, chills, weight loss? No    Do you have any vision problems? No    Do you have any chest pain or edema? No    Do you have any shortness of breath or wheezing?  No    Do you have stomach problems? No    Do you have any numbness or focal weakness? No    Do you have diabetes? No    Do you have problems with bleeding or clotting? Yes, on warfarin    Do you have an rashes or other skin lesions? Yes, eczema

## 2018-08-06 ENCOUNTER — OFFICE VISIT (OUTPATIENT)
Dept: CARDIOLOGY | Facility: CLINIC | Age: 78
End: 2018-08-06
Attending: FAMILY MEDICINE
Payer: COMMERCIAL

## 2018-08-06 VITALS
HEART RATE: 66 BPM | DIASTOLIC BLOOD PRESSURE: 64 MMHG | HEIGHT: 71 IN | SYSTOLIC BLOOD PRESSURE: 116 MMHG | WEIGHT: 168 LBS | BODY MASS INDEX: 23.52 KG/M2

## 2018-08-06 DIAGNOSIS — E78.5 HYPERLIPIDEMIA LDL GOAL <100: ICD-10-CM

## 2018-08-06 DIAGNOSIS — I48.0 PAROXYSMAL ATRIAL FIBRILLATION (H): Primary | ICD-10-CM

## 2018-08-06 PROCEDURE — 99204 OFFICE O/P NEW MOD 45 MIN: CPT | Mod: 25 | Performed by: INTERNAL MEDICINE

## 2018-08-06 PROCEDURE — 93000 ELECTROCARDIOGRAM COMPLETE: CPT | Performed by: INTERNAL MEDICINE

## 2018-08-06 NOTE — LETTER
8/6/2018      Pieter Ulloa MD, MD  [address] [place] [address]      RE: Fredrick Mehta       Dear Colleague,    I had the pleasure of seeing Fredrick Mehta in the [facility].    Service Date: 08/06/2018      HISTORY OF PRESENT ILLNESS:  It is my pleasure seeing Mr. Fredrick Mehta, a very pleasant 77-year-old retired , for paroxysmal atrial fibrillation.  He has the following medical issues:    1.  Paroxysmal atrial fibrillation.  Until 1 year ago, seen at the [facility] by my colleague, Dr. James Sarkar.  Managed with rate control and warfarin.   2.  Dyslipidemia.   3.  Alzheimer dementia, on donepezil and memantine.   4.  Hypothyroidism.      Mr. Mehta lives in [place] and it is easier for him to come to our clinic rather than the [place].  Thus, he has requested a change in his cardiology clinic.  He has been seen for many years by Dr. James Sarkar at the [facility].  The patient has a history of paroxysmal atrial fibrillation for over 10 years.  He has been treated with rate control and anticoagulation (warfarin) and as far as I can tell, he has never been tried on antiarrhythmic drug therapy.  Since 2017, he has been on high-dose diltiazem  mg b.i.d.  The patient is clearly rather forgetful at this point, but as far as he can tell, he has not had symptomatic atrial fibrillation in years.  He is very happy with that because he recalls that when he had atrial fibrillation, his heart was beating fast and he was uncomfortable with that.  He has had no issues with anticoagulation.  He has had no syncope or near-syncope.  He is very modestly physically active and with no symptoms to suggest angina.  No orthopnea, PND or lower extremity edema.      PHYSICAL EXAMINATION:   VITAL SIGNS:  Blood pressure 116/64, pulse 66 and regular, weight 76 kg.  Height 180 cm.   GENERAL:  He is a pleasant gentleman who is alert.  He is accompanied  by his spouse.   HEENT:  Normocephalic, atraumatic.  He wears glasses.   NECK:  Supple, without carotid bruits, 2+ carotid pulses.   LUNGS:  Clear.   CARDIOVASCULAR:  Normal JVP, regular rhythm, no gallop, murmur or rub.   ABDOMEN:  Soft, nontender.  Negative HJR.   EXTREMITIES:  No clubbing, cyanosis or edema.   SKIN:  No rashes.   EXTREMITIES:  He is wearing an immobilizing boot on the right foot.  Because of the recent fall and fracture.   SKIN:  Multiple seborrheic keratosis lesions.   BACK:  No CVA tenderness.      DIAGNOSTIC STUDIES:  His 12-lead ECG today showed mild sinus bradycardia, otherwise normal.      His most recent echocardiogram was from 2012 showing normal LV function, no significant valvular abnormalities.      Most recent laboratory studies include INR of 2.2.  In 10/2017, creatinine was 1.1, sodium 141, potassium 4.0.  Cholesterol 200, HDL 59, .      IMPRESSION:   1.  Paroxysmal atrial fibrillation.  I have reviewed previous tracing from 2012 and 2007 that show clear atrial fibrillation.  The patient has been treated with rate control (high dose diltiazem ER) and warfarin.  He has done very well with this.  He has no apparent side effects from the warfarin and his blood pressure and heart rate are perfect on this.  This treatment plan is working very well for the patient and there is no reason to make any changes.   His anticoagulation has been monitored by Dr. Ulloa's clinic.  Overall, he has had stable INR status without much ups and downs.  Therefore, we do not need to consider switch to a DOAC.     2.  Dyslipidemia.  Monitored by Dr. Ulloa.  He is on pravastatin 40 mg daily.      RECOMMENDATIONS:   1.  Continue current medical therapy.   2.  Echocardiogram, as it has been almost 7 years since previous study.  This is to look for LV function and possible valvular lesions.   3.  I have requested followup in our clinic in 1 year, but would of course see the patient sooner if he has  any problems.      Thank you for the opportunity to be involved in Mr. Mehta's care.      cc:      Pieter Ulloa MD   [facility]   [address] [place] [address]         KJ ALBA MD             D: 2018   T: 2018   MT: MARIBEL      Name:     CAITLYN MEHTA   MRN:      [id number]        Account:      [id number]   :      1940           Service Date: 2018      Document: [id number]         Outpatient Encounter Prescriptions as of 2018   Medication Sig Dispense Refill     Cetirizine HCl (ZYRTEC ALLERGY PO) Take  by mouth daily as needed.       diltiazem 240 MG 24 hr capsule Take 1 capsule (240 mg) by mouth 2 times daily 180 capsule 3     donepezil (ARICEPT) 10 MG tablet Take 1 tablet (10 mg) by mouth At Bedtime 30 tablet 0     IBUPROFEN PO        levothyroxine (SYNTHROID/LEVOTHROID) 50 MCG tablet Take 1 tablet (50 mcg) by mouth daily 90 tablet 3     memantine (NAMENDA) 5 MG tablet   1     pravastatin (PRAVACHOL) 10 MG tablet TAKE 1 TABLET BY MOUTH EVERY EVENING. TAKE TOGETHER WITH 20MG. 90 tablet 3     pravastatin (PRAVACHOL) 20 MG tablet TAKE ONE TABLET BY MOUTH EVERY EVENING. TAKE TOGETHER WITH 10MG. 90 tablet 0     Travoprost 0.004 % SOLN Place 1 drop into both eyes At Bedtime 7.5 mL 3     warfarin (COUMADIN) 5 MG tablet Take 5 mg by mouth M and Th, and 7.5 mg ROW, or as directed per Coumadin clinic. 135 tablet 0     Acetaminophen (TYLENOL PO)        [DISCONTINUED] pravastatin (PRAVACHOL) 20 MG tablet TAKE ONE TABLET BY MOUTH EVERY EVENING. TAKE TOGETHER WITH 10MG. 90 tablet 0     No facility-administered encounter medications on file as of 2018.        Again, thank you for allowing me to participate in the care of your patient.      Sincerely,    Kj Alba MD     [facility]

## 2018-08-06 NOTE — LETTER
8/6/2018    Pieter Ulloa MD, MD  [address] [facility] [address]  [facility] [address]    RE: Fredrick Mehta       Dear Colleague,    I had the pleasure of seeing Fredrick Mehta in the [facility].    HPI and Plan:   See dictation    Orders Placed This Encounter   Procedures     Follow-Up with Cardiac Advanced Practice Provider     EKG 12-lead complete w/read - Clinics (performed today)     Echocardiogram       No orders of the defined types were placed in this encounter.      Medications Discontinued During This Encounter   Medication Reason     pravastatin (PRAVACHOL) 20 MG tablet Medication Reconciliation Clean Up         Encounter Diagnosis   Name Primary?     Chronic atrial fibrillation (H) Yes       CURRENT MEDICATIONS:  Current Outpatient Prescriptions   Medication Sig Dispense Refill     Cetirizine HCl (ZYRTEC ALLERGY PO) Take  by mouth daily as needed.       diltiazem 240 MG 24 hr capsule Take 1 capsule (240 mg) by mouth 2 times daily 180 capsule 3     donepezil (ARICEPT) 10 MG tablet Take 1 tablet (10 mg) by mouth At Bedtime 30 tablet 0     IBUPROFEN PO        levothyroxine (SYNTHROID/LEVOTHROID) 50 MCG tablet Take 1 tablet (50 mcg) by mouth daily 90 tablet 3     memantine (NAMENDA) 5 MG tablet   1     pravastatin (PRAVACHOL) 10 MG tablet TAKE 1 TABLET BY MOUTH EVERY EVENING. TAKE TOGETHER WITH 20MG. 90 tablet 3     pravastatin (PRAVACHOL) 20 MG tablet TAKE ONE TABLET BY MOUTH EVERY EVENING. TAKE TOGETHER WITH 10MG. 90 tablet 0     Travoprost 0.004 % SOLN Place 1 drop into both eyes At Bedtime 7.5 mL 3     warfarin (COUMADIN) 5 MG tablet Take 5 mg by mouth M and Th, and 7.5 mg ROW, or as directed per Coumadin clinic. 135 tablet 0     Acetaminophen (TYLENOL PO)        [DISCONTINUED] pravastatin (PRAVACHOL) 20 MG tablet TAKE ONE TABLET BY MOUTH EVERY EVENING. TAKE TOGETHER WITH 10MG. 90 tablet 0       ALLERGIES     Allergies   Allergen Reactions     Hay Fever & [A.R.M.]      Latex       Seasonal Allergies        PAST MEDICAL HISTORY:  Past Medical History:   Diagnosis Date     Anatomical narrow angle      Benign positional vertigo      Cataracts      Glaucoma     OAG     Hypercholesterolemia      Migraine      Paroxysmal atrial fibrillation (H)        PAST SURGICAL HISTORY:  Past Surgical History:   Procedure Laterality Date     PROSTATE SURGERY  1999     SELECTIVE LASER TRABECULOPLASTY (SLT) OD (RIGHT EYE)  6/2007    RE       FAMILY HISTORY:  Family History   Problem Relation Age of Onset     Glaucoma Maternal Grandfather      Macular Degeneration No family hx of        SOCIAL HISTORY:  Social History     Social History     Marital status:      Spouse name: N/A     Number of children: N/A     Years of education: N/A     Social History Main Topics     Smoking status: Former Smoker     Packs/day: 1.00     Types: Cigarettes     Start date: 1958     Quit date: 1986     Smokeless tobacco: Never Used     Alcohol use No     Drug use: No     Sexual activity: Not Asked     Other Topics Concern     None     Social History Narrative       Review of Systems:  Skin:  Negative       Eyes:  Positive for glasses    ENT:  Negative      Respiratory:  Negative       Cardiovascular:  Negative      Gastroenterology: Negative      Genitourinary:  not assessed      Musculoskeletal:  Negative      Neurologic:  Negative      Psychiatric:  Negative      Heme/Lymph/Imm:  Negative      Endocrine:  Negative        [id number]          Thank you for allowing me to participate in the care of your patient.      Sincerely,     Joseluis Moscoso MD     [facility]    cc:   Pieter Ulloa MD  [address] [facility] [address] [facility] [place] [address]

## 2018-08-06 NOTE — MR AVS SNAPSHOT
After Visit Summary   8/6/2018    Fredrick Mehta    MRN: [id number]           Patient Information     Date Of Birth          1940        Visit Information        Provider Department      8/6/2018 2:45 PM Joseluis Moscoso MD [facility]        Today's Diagnoses     Paroxysmal atrial fibrillation (H)    -  1    Hyperlipidemia LDL goal <100           Follow-ups after your visit        Additional Services     Follow-Up with Cardiac Advanced Practice Provider                 Your next 10 appointments already scheduled     Aug 14, 2018  1:45 PM CDT   Ech Complete with [id number]   [facility] CV Echocardiography (Cardiovascular Imaging at [facility])    [address] [id number] [phone]           1.  Please bring or wear a comfortable two-piece outfit. 2.  You may eat, drink and take your normal medicines. 3.  For any questions that cannot be answered, please contact the ordering physician 4.  Please do not wear perfumes or scented lotions on the day of your exam.            Aug 17, 2018  1:00 PM CDT   Return Walk In Ortho with Josh Campos MD   [facility] Sports and Orthopaedic Walk In Clinic ([facility] [place])    [address] [facility]  [address] [facility] [id number] [phone]            Aug 21, 2018  3:00 PM CDT   Anticoagulation Visit with  [facility] CLINIC   [facility]    [address] [facility] [address] [phone]            Dec 12, 2018  2:45 PM CST   RETURN GLAUCOMA with Kim Joel MD   [facility] ([facility])    [address]  [facility] [address] [phone]              Future tests that were ordered for you today     Open Future Orders        Priority Expected Expires Ordered    Follow-Up with Cardiac Advanced Practice Provider  "Routine 8/6/2019 8/7/2019 8/6/2018    Echocardiogram Routine 8/15/2018 8/7/2019 8/6/2018            Who to contact     If you have questions or need follow up information about today's clinic visit or your schedule please contact [facility] directly at [phone].  Normal or non-critical lab and imaging results will be communicated to you by MyChart, letter or phone within 4 business days after the clinic has received the results. If you do not hear from us within 7 days, please contact the clinic through MyChart or phone. If you have a critical or abnormal lab result, we will notify you by phone as soon as possible.  Submit refill requests through [employer] or call your pharmacy and they will forward the refill request to us. Please allow 3 business days for your refill to be completed.          Additional Information About Your Visit        Care EveryWhere ID     This is your Care EveryWhere ID. This could be used by other organizations to access your [place] medical records  [id number]        Your Vitals Were     Pulse Height BMI (Body Mass Index)             66 1.803 m (5' 10.98\") 23.44 kg/m2          Blood Pressure from Last 3 Encounters:   08/06/18 116/64   08/02/18 116/66   10/24/17 127/81    Weight from Last 3 Encounters:   08/06/18 76.2 kg (168 lb)   10/17/17 77.1 kg (170 lb)   08/02/17 78.4 kg (172 lb 12.8 oz)              We Performed the Following     EKG 12-lead complete w/read - Clinics (performed today)        Primary Care Provider Office Phone # Fax #    Pieter Ulloa [phone] [address] [facility] [address] [facility] [address]  [facility] [address]        Equal Access to Services     JAYSHREE DILLARD : Hadii allie Lnud, wadevanda luqadaha, qasandrine kaalmada jerica, alma smyth. So [facility] [phone].    ATENCIÓN: Si habla español, tiene a myers disposición servicios gratuitos de asistencia lingüística. Llame al " [phone].    We comply with applicable federal civil rights laws and [place] laws. We do not discriminate on the basis of race, color, national origin, age, disability, sex, sexual orientation, or gender identity.            Thank you!     Thank you for choosing [facility]  for your care. Our goal is always to provide you with excellent care. Hearing back from our patients is one way we can continue to improve our services. Please take a few minutes to complete the written survey that you may receive in the mail after your visit with us. Thank you!             Your Updated Medication List - Protect others around you: Learn how to safely use, store and throw away your medicines at www.disposemymeds.org.          This list is accurate as of 8/6/18  3:20 PM.  Always use your most recent med list.                   Brand Name Dispense Instructions for use Diagnosis    ARICEPT 10 MG tablet   Generic drug:  donepezil     30 tablet    Take 1 tablet (10 mg) by mouth At Bedtime    Memory loss       diltiazem 240 MG 24 hr capsule    CARTIA XT    180 capsule    Take 1 capsule (240 mg) by mouth 2 times daily    Chronic atrial fibrillation (H)       IBUPROFEN PO           levothyroxine 50 MCG tablet    SYNTHROID/LEVOTHROID    90 tablet    Take 1 tablet (50 mcg) by mouth daily    Hypothyroidism, unspecified type       memantine 5 MG tablet    NAMENDA          * pravastatin 10 MG tablet    PRAVACHOL    90 tablet    TAKE 1 TABLET BY MOUTH EVERY EVENING. TAKE TOGETHER WITH 20MG.    Mixed hyperlipidemia       * pravastatin 20 MG tablet    PRAVACHOL    90 tablet    TAKE ONE TABLET BY MOUTH EVERY EVENING. TAKE TOGETHER WITH 10MG.    Mixed hyperlipidemia       Travoprost 0.004 % Soln     7.5 mL    Place 1 drop into both eyes At Bedtime    Primary open angle glaucoma of both eyes, moderate stage       TYLENOL PO           warfarin 5 MG tablet    COUMADIN    135 tablet    Take 5 mg by mouth M  and Th, and 7.5 mg ROW, or as directed per Coumadin clinic.    Long term current use of anticoagulant therapy       ZYRTEC ALLERGY PO      Take  by mouth daily as needed.        * Notice:  This list has 2 medication(s) that are the same as other medications prescribed for you. Read the directions carefully, and ask your doctor or other care provider to review them with you.

## 2018-08-06 NOTE — PROGRESS NOTES
HPI and Plan:   See dictation    Orders Placed This Encounter   Procedures     Follow-Up with Cardiac Advanced Practice Provider     EKG 12-lead complete w/read - Clinics (performed today)     Echocardiogram       No orders of the defined types were placed in this encounter.      Medications Discontinued During This Encounter   Medication Reason     pravastatin (PRAVACHOL) 20 MG tablet Medication Reconciliation Clean Up         Encounter Diagnosis   Name Primary?     Chronic atrial fibrillation (H) Yes       CURRENT MEDICATIONS:  Current Outpatient Prescriptions   Medication Sig Dispense Refill     Cetirizine HCl (ZYRTEC ALLERGY PO) Take  by mouth daily as needed.       diltiazem 240 MG 24 hr capsule Take 1 capsule (240 mg) by mouth 2 times daily 180 capsule 3     donepezil (ARICEPT) 10 MG tablet Take 1 tablet (10 mg) by mouth At Bedtime 30 tablet 0     IBUPROFEN PO        levothyroxine (SYNTHROID/LEVOTHROID) 50 MCG tablet Take 1 tablet (50 mcg) by mouth daily 90 tablet 3     memantine (NAMENDA) 5 MG tablet   1     pravastatin (PRAVACHOL) 10 MG tablet TAKE 1 TABLET BY MOUTH EVERY EVENING. TAKE TOGETHER WITH 20MG. 90 tablet 3     pravastatin (PRAVACHOL) 20 MG tablet TAKE ONE TABLET BY MOUTH EVERY EVENING. TAKE TOGETHER WITH 10MG. 90 tablet 0     Travoprost 0.004 % SOLN Place 1 drop into both eyes At Bedtime 7.5 mL 3     warfarin (COUMADIN) 5 MG tablet Take 5 mg by mouth M and Th, and 7.5 mg ROW, or as directed per Coumadin clinic. 135 tablet 0     Acetaminophen (TYLENOL PO)        [DISCONTINUED] pravastatin (PRAVACHOL) 20 MG tablet TAKE ONE TABLET BY MOUTH EVERY EVENING. TAKE TOGETHER WITH 10MG. 90 tablet 0       ALLERGIES     Allergies   Allergen Reactions     Hay Fever & [A.R.M.]      Latex      Seasonal Allergies        PAST MEDICAL HISTORY:  Past Medical History:   Diagnosis Date     Anatomical narrow angle      Benign positional vertigo      Cataracts      Glaucoma     OAG     Hypercholesterolemia      Migraine       Paroxysmal atrial fibrillation (H)        PAST SURGICAL HISTORY:  Past Surgical History:   Procedure Laterality Date     PROSTATE SURGERY  1999     SELECTIVE LASER TRABECULOPLASTY (SLT) OD (RIGHT EYE)  6/2007    RE       FAMILY HISTORY:  Family History   Problem Relation Age of Onset     Glaucoma Maternal Grandfather      Macular Degeneration No family hx of        SOCIAL HISTORY:  Social History     Social History     Marital status:      Spouse name: N/A     Number of children: N/A     Years of education: N/A     Social History Main Topics     Smoking status: Former Smoker     Packs/day: 1.00     Types: Cigarettes     Start date: 1958     Quit date: 1986     Smokeless tobacco: Never Used     Alcohol use No     Drug use: No     Sexual activity: Not Asked     Other Topics Concern     None     Social History Narrative       Review of Systems:  Skin:  Negative       Eyes:  Positive for glasses    ENT:  Negative      Respiratory:  Negative       Cardiovascular:  Negative      Gastroenterology: Negative      Genitourinary:  not assessed      Musculoskeletal:  Negative      Neurologic:  Negative      Psychiatric:  Negative      Heme/Lymph/Imm:  Negative      Endocrine:  Negative        [id number]

## 2018-08-07 NOTE — PROGRESS NOTES
Service Date: 08/06/2018      HISTORY OF PRESENT ILLNESS:    It is my pleasure seeing Mr. Fredrick Mehta, a pleasant 77-year-old retired , for paroxysmal atrial fibrillation.  He has the following medical issues:    1.  Paroxysmal atrial fibrillation.  Previously followed at the [facility] by Dr. James Sarkar.  Managed with rate control and warfarin.   2.  Dyslipidemia.   3.  Alzheimer's dementia, on donepezil and memantine.   4.  Hypothyroidism.      Mr. Mehta lives in [facility] and it is easier for him to come to our clinic rather than the [facility].  Thus, he requested a change in his cardiology care.  He has been followed by Dr. James Sarkar.  The patient has had paroxysmal atrial fibrillation for over 10 years.  He has been treated with rate control and anticoagulation (warfarin).  As far as I can tell, he has never been on antiarrhythmic drug therapy.  Since 2017, he has taken high-dose diltiazem  mg b.i.d.      The patient is forgetful but, as far as he recalls, he has not had atrial fibrillation in some time.  He is happy with that because he recalls that during atrial fibrillation his heart rate was very fast and he became uncomfortable.  He has had no issues with anticoagulation.  No syncope or near-syncope.  He is only modestly physically active with no symptoms to suggest angina.  No orthopnea, PND or lower extremity edema.      PHYSICAL EXAMINATION:   VITAL SIGNS:  Blood pressure 116/64, pulse 66 and regular, weight 76 kg.  Height 180 cm.   GENERAL:  He is a pleasant gentleman who is alert.  He is accompanied by his spouse.   HEENT:  Normocephalic, atraumatic.  He wears glasses.   NECK:  Supple, without carotid bruits, 2+ carotid pulses.   LUNGS:  Clear.   CARDIOVASCULAR:  Normal JVP, regular rhythm, no gallop, murmur or rub.   ABDOMEN:  Soft, nontender.  Negative HJR.   EXTREMITIES:  No clubbing, cyanosis or edema.   SKIN:  No rashes.   EXTREMITIES:  He is  wearing an immobilizing boot on the right foot because of a recent fall and fracture.   SKIN:  Multiple seborrheic keratosis lesions.   BACK:  No CVA tenderness.      DIAGNOSTIC STUDIES:    His 12-lead ECG today showed mild sinus bradycardia, otherwise normal.    His most recent echocardiogram was from  showing normal LV function, no significant valvular abnormalities.    Most recent laboratory studies include INR of 2.2.  In 10/2017, creatinine was 1.1, sodium 141, potassium 4.0.  Cholesterol 200, HDL 59, .      IMPRESSION:   1.  Paroxysmal atrial fibrillation.  ECG tracings from  and  show atrial fibrillation.  The patient has been treated with high-dose diltiazem ER and warfarin.  He has done really well.  He has no apparent side-effects from the diltiazem and both his blood pressure and heart rate were perfect today.  This treatment plan is working well for the patient and there is no reason to make changes.         His anticoagulation has been monitored by Dr. Ulloa's clinic.  Overall, Mr. Mcclure has had stable INR status without much ups and downs.  We do not need to consider switching to a DOAC.     2.  Dyslipidemia.  Monitored by Dr. Ulloa.  He is on pravastatin 40 mg daily.      RECOMMENDATIONS:   1.  Continue current medical therapy.   2.  Echocardiogram, as it has been almost 7 years since his most recent study.  This is to reassess LV function and look for valvular lesions.   3.  Follow-up in our clinic in 1 year, but would see the patient sooner if he has concerns.      Thank you for the opportunity to be involved in Mr. Mcclure's care.        KJ ALBA MD, FACC          cc:      Pieter Ulloa MD   [address] [address]          D: 2018   T: 2018   MT: MARIBEL      Name:     CAITLYN MCCLURE   MRN:      [id number]        Account:      [id number]   :      1940           Service Date: 2018      Document:  [id number]

## 2018-08-10 ENCOUNTER — TELEPHONE (OUTPATIENT)
Dept: CARDIOLOGY | Facility: CLINIC | Age: 78
End: 2018-08-10

## 2018-08-14 ENCOUNTER — HOSPITAL ENCOUNTER (OUTPATIENT)
Dept: CARDIOLOGY | Facility: CLINIC | Age: 78
Discharge: HOME | End: 2018-08-14
Attending: INTERNAL MEDICINE | Admitting: INTERNAL MEDICINE
Payer: MEDICARE

## 2018-08-14 DIAGNOSIS — I48.0 PAROXYSMAL ATRIAL FIBRILLATION (H): ICD-10-CM

## 2018-08-14 PROCEDURE — 93306 TTE W/DOPPLER COMPLETE: CPT

## 2018-08-14 PROCEDURE — 93306 TTE W/DOPPLER COMPLETE: CPT | Mod: 26 | Performed by: INTERNAL MEDICINE

## 2018-08-15 ENCOUNTER — TELEPHONE (OUTPATIENT)
Dept: CARDIOLOGY | Facility: CLINIC | Age: 78
End: 2018-08-15

## 2018-08-15 NOTE — TELEPHONE ENCOUNTER
Notified pt of normal echo results per Dr Moscoso comments. TEMO Miranda                  Looks good, EF was normal, no clinically significant valvular abnormalities.   Please call.   JESSICA Vazquez

## 2018-08-17 ENCOUNTER — OFFICE VISIT (OUTPATIENT)
Dept: ORTHOPEDICS | Facility: CLINIC | Age: 78
End: 2018-08-17
Payer: COMMERCIAL

## 2018-08-17 VITALS — HEART RATE: 65 BPM | WEIGHT: 168 LBS | HEIGHT: 70 IN | BODY MASS INDEX: 24.05 KG/M2

## 2018-08-17 DIAGNOSIS — M25.572 ACUTE LEFT ANKLE PAIN: Primary | ICD-10-CM

## 2018-08-17 NOTE — PROGRESS NOTES
"      SPORTS & ORTHOPEDIC WALK-IN FOLLOW-UP VISIT 8/17/2018    Interval History:     Follow up reason: L ankle    Date of injury: 7/28/18 twisted    Date last seen: 8/2/18    Following Therapeutic Plan: Yes     Pain: Improving    Function: Unchanged    Interval History: NA              HISTORY OF PRESENT ILLNESS  Mr. Mehta is a pleasant 77 year old year old male following up with a left ankle injury.  Fredrick was originally seen a couple of weeks ago after a twisted ankle.  He was put into a boot for immobilization and pain control.  Fortunately, he is doing quite a bit better today.  He has not felt pain for over a week.  Additional history: as documented      REVIEW OF SYSTEMS (8/17/2018)  10 point ROS of systems including Constitutional, Eyes, Respiratory, Cardiovascular, Gastroenterology, Genitourinary, Integumentary, Musculoskeletal, Psychiatric were all negative except for pertinent positives noted in my HPI.     PHYSICAL EXAM  Vitals:    08/17/18 1254   Pulse: 65   Weight: 76.2 kg (168 lb)   Height: 1.778 m (5' 10\")     General  - normal appearance, in no obvious distress  CV  - normal pulses at posterior tib and dorsalis pedis  Pulm  - normal respiratory pattern, non-labored  Musculoskeletal - left ankle  - stance: normal gait without limp  - inspection: no swelling or effusion, normal bone and joint alignment, no obvious deformity  - palpation: no soft tissue tenderness, malleoli and tarsal bones non-tender  - ROM: normal dorsiflexion, plantar flexion, inversion, eversion, not painful  Neuro  - no sensory or motor deficit, grossly normal coordination, normal muscle tone  Skin  - no ecchymosis, erythema, warmth, or induration, no obvious rash  Psych  - interactive, appropriate, normal mood and affect        ASSESSMENT & PLAN  Mr. Mehta is a 77 year old year old male following up with a left ankle injury.    I am happy that Fredrick is feeling better.  We did discuss a transition out of the boot, he was hoping " this would be the case.  He should transition safely out of the boot over the next half a week to a week by wearing the boot when out of the house and not wearing the boot at home.  He does know that if he has a return of pain he should return to the boot.    Fredrick can follow-up as needed or if worsening or concerned.    It was a pleasure seeing Fredrick.        Hugo Jaramillo DO, CAM

## 2018-08-17 NOTE — LETTER
"8/17/2018       RE: Fredrick Mehta  [address]  [place] [facility] [address]     Dear Colleague,    Thank you for referring your patient, Fredrick Mehta, to the [facility] SPORTS AND ORTHOPAEDIC WALK IN CLINIC at [facility]. Please see a copy of my visit note below.          SPORTS & ORTHOPEDIC WALK-IN FOLLOW-UP VISIT 8/17/2018    Interval History:     Follow up reason: L ankle    Date of injury: 7/28/18 twisted    Date last seen: 8/2/18    Following Therapeutic Plan: Yes     Pain: Improving    Function: Unchanged    Interval History: NA              HISTORY OF PRESENT ILLNESS  Mr. Mehta is a pleasant 77 year old year old male following up with a left ankle injury.  Fredrick was originally seen a couple of weeks ago after a twisted ankle.  He was put into a boot for immobilization and pain control.  Fortunately, he is doing quite a bit better today.  He has not felt pain for over a week.  Additional history: as documented     PHYSICAL EXAM  Vitals:    08/17/18 1254   Pulse: 65   Weight: 76.2 kg (168 lb)   Height: 1.778 m (5' 10\")     General  - normal appearance, in no obvious distress  CV  - normal pulses at posterior tib and dorsalis pedis  Pulm  - normal respiratory pattern, non-labored  Musculoskeletal - left ankle  - stance: normal gait without limp  - inspection: no swelling or effusion, normal bone and joint alignment, no obvious deformity  - palpation: no soft tissue tenderness, malleoli and tarsal bones non-tender  - ROM: normal dorsiflexion, plantar flexion, inversion, eversion, not painful  Neuro  - no sensory or motor deficit, grossly normal coordination, normal muscle tone  Skin  - no ecchymosis, erythema, warmth, or induration, no obvious rash  Psych  - interactive, appropriate, normal mood and affect        ASSESSMENT & PLAN  Mr. Mehta is a 77 year old year old male following up with a left ankle injury.    I am happy that Fredrick is feeling better.  We did " discuss a transition out of the boot, he was hoping this would be the case.  He should transition safely out of the boot over the next half a week to a week by wearing the boot when out of the house and not wearing the boot at home.  He does know that if he has a return of pain he should return to the boot.    Fredrick can follow-up as needed or if worsening or concerned.    It was a pleasure seeing Fredrick.        Hugo Jaramillo DO, CAQSM

## 2018-08-17 NOTE — MR AVS SNAPSHOT
"              After Visit Summary   8/17/2018    Fredrick Mehta    MRN: [id number]           Patient Information     Date Of Birth          1940        Visit Information        Provider Department      8/17/2018 1:00 PM Hugo Jaramillo DO [facility] Sports [facility] Orthopa[facility]        Today's Diagnoses     Acute left ankle pain    -  1       Follow-ups after your visit        Your next 10 appointments already scheduled     Aug 21, 2018  3:00 PM CDT   Anticoagulation [facility] with [facility]    [address]  [facility] [address] [phone]            Dec 12, 2018  2:45 PM CST   RETURN GLAUCOMA with Kim Joel MD   [facility] ([facility])    [address]  [address] [address]  [facility] [address] [phone]              Who to contact     Please call your clinic at [phone] to:    Ask questions about your health    Make or cancel appointments    Discuss your medicines    Learn about your test results    Speak to your doctor            Additional Information About Your Visit        Care EveryWhere ID     This is your Care EveryWhere ID. This could be used by other organizations to access your [place] medical records  [id number]        Your Vitals Were     Pulse Height BMI (Body Mass Index)             65 1.778 m (5' 10\") 24.11 kg/m2          Blood Pressure from Last 3 Encounters:   08/06/18 116/64   08/02/18 116/66   10/24/17 127/81    Weight from Last 3 Encounters:   08/17/18 76.2 kg (168 lb)   08/06/18 76.2 kg (168 lb)   10/17/17 77.1 kg (170 lb)              Today, you had the following     No orders found for display       Primary Care Provider Office Phone # Fax #    Pieter Ulloa [phone]       [address] [place] [address]        Equal Access to Services     JAYSHREE DILLARD AH: Hadii allie huizaro Sodanika, waaxda luqadaha, qaybta kaalmada " alma pizanochris banuelosaan ah. [facility] [phone].    ATENCIÓN: Si antoniola erasto, tiene a myers disposición servicios gratuitos de asistencia lingüística. Bere al [phone].    We comply with applicable federal civil rights laws and [place] laws. We do not discriminate on the basis of race, color, national origin, age, disability, sex, sexual orientation, or gender identity.            Thank you!     Thank you for choosing [facility] SPORTS AND ORTHOPAEDIC WALK IN CLINIC  for your care. Our goal is always to provide you with excellent care. Hearing back from our patients is one way we can continue to improve our services. Please take a few minutes to complete the written survey that you may receive in the mail after your visit with us. Thank you!             Your Updated Medication List - Protect others around you: Learn how to safely use, store and throw away your medicines at www.disposemymeds.org.          This list is accurate as of 8/17/18  1:59 PM.  Always use your most recent med list.                   Brand Name Dispense Instructions for use Diagnosis    ARICEPT 10 MG tablet   Generic drug:  donepezil     30 tablet    Take 1 tablet (10 mg) by mouth At Bedtime    Memory loss       diltiazem 240 MG 24 hr capsule    CARTIA XT    180 capsule    Take 1 capsule (240 mg) by mouth 2 times daily    Chronic atrial fibrillation (H)       IBUPROFEN PO           levothyroxine 50 MCG tablet    SYNTHROID/LEVOTHROID    90 tablet    Take 1 tablet (50 mcg) by mouth daily    Hypothyroidism, unspecified type       memantine 5 MG tablet    NAMENDA          * pravastatin 10 MG tablet    PRAVACHOL    90 tablet    TAKE 1 TABLET BY MOUTH EVERY EVENING. TAKE TOGETHER WITH 20MG.    Mixed hyperlipidemia       * pravastatin 20 MG tablet    PRAVACHOL    90 tablet    TAKE ONE TABLET BY MOUTH EVERY EVENING. TAKE TOGETHER WITH 10MG.    Mixed hyperlipidemia       Travoprost 0.004 % Soln     7.5 mL    Place 1 drop  into both eyes At Bedtime    Primary open angle glaucoma of both eyes, moderate stage       TYLENOL PO           warfarin 5 MG tablet    COUMADIN    135 tablet    Take 5 mg by mouth M and Th, and 7.5 mg ROW, or as directed per Coumadin clinic.    Long term current use of anticoagulant therapy       ZYRTEC ALLERGY PO      Take  by mouth daily as needed.        * Notice:  This list has 2 medication(s) that are the same as other medications prescribed for you. Read the directions carefully, and ask your doctor or other care provider to review them with you.

## 2018-08-21 ENCOUNTER — ANTICOAGULATION THERAPY VISIT (OUTPATIENT)
Dept: NURSING | Facility: CLINIC | Age: 78
End: 2018-08-21
Payer: COMMERCIAL

## 2018-08-21 ENCOUNTER — TELEPHONE (OUTPATIENT)
Dept: FAMILY MEDICINE | Facility: CLINIC | Age: 78
End: 2018-08-21

## 2018-08-21 DIAGNOSIS — Z79.01 LONG-TERM (CURRENT) USE OF ANTICOAGULANTS: ICD-10-CM

## 2018-08-21 DIAGNOSIS — I48.91 ATRIAL FIBRILLATION (H): ICD-10-CM

## 2018-08-21 DIAGNOSIS — I48.20 CHRONIC ATRIAL FIBRILLATION (H): ICD-10-CM

## 2018-08-21 LAB — INR POINT OF CARE: 3.4 (ref 0.86–1.14)

## 2018-08-21 PROCEDURE — 36416 COLLJ CAPILLARY BLOOD SPEC: CPT

## 2018-08-21 PROCEDURE — 85610 PROTHROMBIN TIME: CPT | Mod: QW

## 2018-08-21 PROCEDURE — 99207 ZZC NO CHARGE NURSE ONLY: CPT

## 2018-08-21 NOTE — MR AVS SNAPSHOT
Fredrick Mehta   8/21/2018 3:00 PM   Anticoagulation Therapy Visit    Description:  77 year old male   Provider:   ANTICOAGULATION CLINIC   Department:   Nurse           INR as of 8/21/2018     Today's INR 3.4!      Anticoagulation Summary as of 8/21/2018     INR goal 2.0-3.0   Today's INR 3.4!   Full warfarin instructions 8/21: 5 mg; Otherwise 5 mg on Mon, Thu; 7.5 mg all other days   Next INR check 9/4/2018    Indications   Atrial fibrillation (H) [I48.91]  Long-term (current) use of anticoagulants [Z79.01] [Z79.01]         Your next Anticoagulation Clinic appointment(s)     Sep 04, 2018  3:00 PM CDT   Anticoagulation Visit with  ANTICOAGULATION CLINIC   [facility] [place] ([facility])    [address]  [place] [address]   [phone]              Contact Numbers     Clinic Number:         August 2018 Details    Sun Mon Tue Wed Thu Fri Sat        1               2               3               4                 5               6               7               8               9               10               11                 12               13               14               15               16               17               18                 19               20               21      5 mg   See details      22      7.5 mg         23      5 mg         24      7.5 mg         25      7.5 mg           26      7.5 mg         27      5 mg         28      7.5 mg         29      7.5 mg         30      5 mg         31      7.5 mg           Date Details   08/21 This INR check               How to take your warfarin dose     To take:  5 mg Take 1 of the 5 mg tablets.    To take:  7.5 mg Take 1.5 of the 5 mg tablets.           September 2018 Details    Sun Mon Tue Wed Thu Fri Sat           1      7.5 mg           2      7.5 mg         3      5 mg         4            5               6               7               8                 9               10               11               12                13               14               15                 16               17               18               19               20               21               22                 23               24               25               26               27               28               29                 30                      Date Details   No additional details    Date of next INR:  9/4/2018         How to take your warfarin dose     To take:  5 mg Take 1 of the 5 mg tablets.    To take:  7.5 mg Take 1.5 of the 5 mg tablets.

## 2018-08-21 NOTE — PROGRESS NOTES
ANTICOAGULATION FOLLOW-UP CLINIC VISIT    Patient Name:  Fredrick Mehta  Date:  8/21/2018  Contact Type:  Face to Face    SUBJECTIVE:     Patient Findings     Positives Change in medications (Namenda started about a month ago- no noted interactions with warfarin ), Change in diet/appetite (Fewer salads than usual recently)           OBJECTIVE    INR Protime   Date Value Ref Range Status   08/21/2018 3.4 (A) 0.86 - 1.14 Final       ASSESSMENT / PLAN  INR assessment SUPRA    Recheck INR In: 2 WEEKS    INR Location Clinic      Anticoagulation Summary as of 8/21/2018     INR goal 2.0-3.0   Today's INR 3.4!   Warfarin maintenance plan 5 mg (5 mg x 1) on Mon, Thu; 7.5 mg (5 mg x 1.5) all other days   Full warfarin instructions 8/21: 5 mg; Otherwise 5 mg on Mon, Thu; 7.5 mg all other days   Weekly warfarin total 47.5 mg   Plan last modified Jessica Coker RN (10/20/2017)   Next INR check 9/4/2018   Target end date     Indications   Atrial fibrillation (H) [I48.91]  Long-term (current) use of anticoagulants [Z79.01] [Z79.01]         Anticoagulation Episode Summary     INR check location [facility]in Clinic    Preferred lab     Send INR reminders to CS ANTICOAGULATION    Comments Patient having INR drawn through [facility] through May 2018. Reach patient [phone].      Anticoagulation Care Providers     Provider Role Specialty Phone number    Yobani Sarkar MD Referring Cardiology [phone]    Pieter Ulloa MD Responsible Family Practice [phone]            See the Encounter Report to view Anticoagulation Flowsheet and Dosing Calendar (Go to Encounters tab in chart review, and find the Anticoagulation Therapy Visit)    Fewer greens recently. Plans to resume usual intake. Dosage adjustment made based on physician directed care plan. Pt will recheck INR in 2 weeks, sooner if concerns. Slight decrease in today's dosage due to supratherapeutic INR.     Pt aware if signs of clotting (pain,  tenderness, swelling, color change in leg or arm, SOB) and bleeding occur (blood in stool, urine, large bruising, bleeding gums, nosebleeds) to have INR check sooner. If sx severe report to ER or concerned for stroke call 911. If general questions or concerns arise, call clinic.    Kalyn Molina RN

## 2018-08-21 NOTE — TELEPHONE ENCOUNTER
Patient due for annual INR clinic referral renewal     Order pended - please review/sign     Thank you  Kalyn HAGEN RN

## 2018-09-05 ENCOUNTER — ANTICOAGULATION THERAPY VISIT (OUTPATIENT)
Dept: NURSING | Facility: CLINIC | Age: 78
End: 2018-09-05
Payer: COMMERCIAL

## 2018-09-05 DIAGNOSIS — Z79.01 LONG-TERM (CURRENT) USE OF ANTICOAGULANTS: ICD-10-CM

## 2018-09-05 DIAGNOSIS — I48.91 ATRIAL FIBRILLATION (H): ICD-10-CM

## 2018-09-05 LAB — INR POINT OF CARE: 2.7 (ref 0.86–1.14)

## 2018-09-05 PROCEDURE — 36416 COLLJ CAPILLARY BLOOD SPEC: CPT

## 2018-09-05 PROCEDURE — 85610 PROTHROMBIN TIME: CPT | Mod: QW

## 2018-09-05 PROCEDURE — 99207 ZZC NO CHARGE NURSE ONLY: CPT

## 2018-09-05 NOTE — PROGRESS NOTES
ANTICOAGULATION FOLLOW-UP CLINIC VISIT    Patient Name:  Fredrick Mehta  Date:  9/5/2018  Contact Type:  Face to Face    SUBJECTIVE:     Patient Findings     Positives No Problem Findings           OBJECTIVE    INR Protime   Date Value Ref Range Status   09/05/2018 2.7 (A) 0.86 - 1.14 Final       ASSESSMENT / PLAN  INR assessment THER    Recheck INR In: 6 WEEKS    INR Location Clinic      Anticoagulation Summary as of 9/5/2018     INR goal 2.0-3.0   Today's INR 2.7   Warfarin maintenance plan 5 mg (5 mg x 1) on Mon, Thu; 7.5 mg (5 mg x 1.5) all other days   Full warfarin instructions 5 mg on Mon, Thu; 7.5 mg all other days   Weekly warfarin total 47.5 mg   No change documented Sandi Wasserman RN   Plan last modified Jessica Coker RN (10/20/2017)   Next INR check 10/16/2018   Target end date     Indications   Atrial fibrillation (H) [I48.91]  Long-term (current) use of anticoagulants [Z79.01] [Z79.01]         Anticoagulation Episode Summary     INR check location [facility]in Clinic    Preferred lab     Send INR reminders to CS ANTICOAGULATION    Comments Patient having INR drawn through [facility] through May 2018. Reach patient [phone].      Anticoagulation Care Providers     Provider Role Specialty Phone number    [facility], Yobani Paniagua MD Referring Cardiology [phone]    Pieter Ulloa MD Responsible Family Practice [phone]            See the Encounter Report to view Anticoagulation Flowsheet and Dosing Calendar (Go to Encounters tab in chart review, and find the Anticoagulation Therapy Visit)    Dosage adjustment made based on physician directed care plan.    Sandi Wasserman RN

## 2018-09-05 NOTE — MR AVS SNAPSHOT
Fredrick Janine   9/5/2018 3:45 PM   Anticoagulation Therapy Visit    Description:  77 year old male   Provider:   ANTICOAGULATION CLINIC   Department:  [facility] Nurse           INR as of 9/5/2018     Today's INR 2.7      Anticoagulation Summary as of 9/5/2018     INR goal 2.0-3.0   Today's INR 2.7   Full warfarin instructions 5 mg on Mon, Thu; 7.5 mg all other days   Next INR check 10/16/2018    Indications   Atrial fibrillation (H) [I48.91]  Long-term (current) use of anticoagulants [Z79.01] [Z79.01]         Your next Anticoagulation Clinic appointment(s)     Oct 16, 2018  3:15 PM CDT   Anticoagulation Visit with  ANTICOAGULATION CLINIC   [facility] ([facility]    [address]  [place] [address]   [phone]              Contact Numbers     Clinic Number:         September 2018 Details    Sun Mon Tue Wed Thu Fri Sat           1                 2               3               4               5      7.5 mg   See details      6      5 mg         7      7.5 mg         8      7.5 mg           9      7.5 mg         10      5 mg         11      7.5 mg         12      7.5 mg         13      5 mg         14      7.5 mg         15      7.5 mg           16      7.5 mg         17      5 mg         18      7.5 mg         19      7.5 mg         20      5 mg         21      7.5 mg         22      7.5 mg           23      7.5 mg         24      5 mg         25      7.5 mg         26      7.5 mg         27      5 mg         28      7.5 mg         29      7.5 mg           30      7.5 mg                Date Details   09/05 This INR check               How to take your warfarin dose     To take:  5 mg Take 1 of the 5 mg tablets.    To take:  7.5 mg Take 1.5 of the 5 mg tablets.           October 2018 Details    Sun Mon Tue Wed Thu Fri Sat      1      5 mg         2      7.5 mg         3      7.5 mg         4      5 mg         5      7.5 mg         6      7.5 mg           7      7.5 mg         8      5  mg         9      7.5 mg         10      7.5 mg         11      5 mg         12      7.5 mg         13      7.5 mg           14      7.5 mg         15      5 mg         16            17               18               19               20                 21               22               23               24               25               26               27                 28               29               30               31                   Date Details   No additional details    Date of next INR:  10/16/2018         How to take your warfarin dose     To take:  5 mg Take 1 of the 5 mg tablets.    To take:  7.5 mg Take 1.5 of the 5 mg tablets.

## 2018-09-11 DIAGNOSIS — E78.2 MIXED HYPERLIPIDEMIA: ICD-10-CM

## 2018-09-12 DIAGNOSIS — Z79.01 LONG TERM CURRENT USE OF ANTICOAGULANT THERAPY: ICD-10-CM

## 2018-09-12 DIAGNOSIS — E03.9 HYPOTHYROIDISM, UNSPECIFIED TYPE: ICD-10-CM

## 2018-09-12 RX ORDER — WARFARIN SODIUM 5 MG/1
TABLET ORAL
Qty: 135 TABLET | Refills: 0 | Status: SHIPPED | OUTPATIENT
Start: 2018-09-12 | End: 2018-12-13

## 2018-09-12 RX ORDER — LEVOTHYROXINE SODIUM 50 UG/1
50 TABLET ORAL DAILY
Qty: 90 TABLET | Refills: 0 | Status: SHIPPED | OUTPATIENT
Start: 2018-09-12 | End: 2018-12-21

## 2018-09-14 RX ORDER — PRAVASTATIN SODIUM 20 MG
TABLET ORAL
Qty: 90 TABLET | Refills: 0 | Status: SHIPPED | OUTPATIENT
Start: 2018-09-14 | End: 2019-02-13

## 2018-10-16 ENCOUNTER — ANTICOAGULATION THERAPY VISIT (OUTPATIENT)
Dept: NURSING | Facility: CLINIC | Age: 78
End: 2018-10-16
Payer: COMMERCIAL

## 2018-10-16 LAB — INR POINT OF CARE: 2.2 (ref 0.86–1.14)

## 2018-10-16 PROCEDURE — 85610 PROTHROMBIN TIME: CPT | Mod: QW

## 2018-10-16 PROCEDURE — 36416 COLLJ CAPILLARY BLOOD SPEC: CPT

## 2018-10-16 NOTE — TELEPHONE ENCOUNTER
",    Pt is overdue for INR renewal/referral. Order pended. Please click the \"sign order\" button in  after review.    Thank you!  "

## 2018-10-16 NOTE — PROGRESS NOTES
ANTICOAGULATION FOLLOW-UP CLINIC VISIT    Patient Name:  Fredrick Mehta  Date:  10/16/2018  Contact Type:  Face to Face    SUBJECTIVE:     Patient Findings     Positives No Problem Findings           OBJECTIVE    INR Protime   Date Value Ref Range Status   10/16/2018 2.2 (A) 0.86 - 1.14 Final       ASSESSMENT / PLAN  INR assessment THER    Recheck INR In: 6 WEEKS    INR Location Clinic      Anticoagulation Summary as of 10/16/2018     INR goal 2.0-3.0   Today's INR 2.2   Warfarin maintenance plan 5 mg (5 mg x 1) on Mon, Thu; 7.5 mg (5 mg x 1.5) all other days   Full warfarin instructions 5 mg on Mon, Thu; 7.5 mg all other days   Weekly warfarin total 47.5 mg   No change documented Maryjane Grey RN   Plan last modified Jessica Coker RN (10/20/2017)   Next INR check 11/27/2018   Target end date     Indications   Atrial fibrillation (H) [I48.91]  Long-term (current) use of anticoagulants [Z79.01] [Z79.01]         Anticoagulation Episode Summary     INR check location [facility]    Preferred lab     Send INR reminders to CS ANTICOAGULATION    Comments Patient having INR drawn through [facility] through May 2018. Reach patient [phone].      Anticoagulation Care Providers     Provider Role Specialty Phone number    [facility], Yobani Paniagua MD Referring Cardiology [phone]    Pieter Ulloa MD Responsible Family Practice [phone]            See the Encounter Report to view Anticoagulation Flowsheet and Dosing Calendar (Go to Encounters tab in chart review, and find the Anticoagulation Therapy Visit)    Pt is 2.2 today. Will continue dosing the same at 5 mg on Monday Thursday and 7.5 mg all the other days. Will recheck in 6 weeks. A vitamin K handout given with a list of foods with vitamin K in them. Pt's wife was asking about eating brussel sprouts. A handout with signs and sxs of bleed and stroke given to pt to have on hand for reference.    Maryjane Grey, RN

## 2018-10-16 NOTE — MR AVS SNAPSHOT
Fredrick Mehta   10/16/2018 3:15 PM   Anticoagulation Therapy Visit    Description:  77 year old male   Provider:   ANTICOAGULATION CLINIC   Department:  [facility] Nurse           INR as of 10/16/2018     Today's INR 2.2      Anticoagulation Summary as of 10/16/2018     INR goal 2.0-3.0   Today's INR 2.2   Full warfarin instructions 5 mg on Mon, Thu; 7.5 mg all other days   Next INR check 11/27/2018    Indications   Atrial fibrillation (H) [I48.91]  Long-term (current) use of anticoagulants [Z79.01] [Z79.01]         Your next Anticoagulation Clinic appointment(s)     Nov 27, 2018  3:30 PM CST   Anticoagulation Visit with  ANTICOAGULATION CLINIC   [facility] ([facility])    [address]  [place] [address]   [phone]              Contact Numbers     Clinic Number:         October 2018 Details    Sun Mon Tue Wed Thu Fri Sat      1               2               3               4               5               6                 7               8               9               10               11               12               13                 14               15               16      7.5 mg   See details      17      7.5 mg         18      5 mg         19      7.5 mg         20      7.5 mg           21      7.5 mg         22      5 mg         23      7.5 mg         24      7.5 mg         25      5 mg         26      7.5 mg         27      7.5 mg           28      7.5 mg         29      5 mg         30      7.5 mg         31      7.5 mg             Date Details   10/16 This INR check               How to take your warfarin dose     To take:  5 mg Take 1 of the 5 mg tablets.    To take:  7.5 mg Take 1.5 of the 5 mg tablets.           November 2018 Details    Sun Mon Tue Wed Thu Fri Sat         1      5 mg         2      7.5 mg         3      7.5 mg           4      7.5 mg         5      5 mg         6      7.5 mg         7      7.5 mg         8      5 mg         9      7.5 mg         10       7.5 mg           11      7.5 mg         12      5 mg         13      7.5 mg         14      7.5 mg         15      5 mg         16      7.5 mg         17      7.5 mg           18      7.5 mg         19      5 mg         20      7.5 mg         21      7.5 mg         22      5 mg         23      7.5 mg         24      7.5 mg           25      7.5 mg         26      5 mg         27            28               29               30                 Date Details   No additional details    Date of next INR:  11/27/2018         How to take your warfarin dose     To take:  5 mg Take 1 of the 5 mg tablets.    To take:  7.5 mg Take 1.5 of the 5 mg tablets.

## 2018-11-27 ENCOUNTER — TELEPHONE (OUTPATIENT)
Dept: FAMILY MEDICINE | Facility: CLINIC | Age: 78
End: 2018-11-27

## 2018-11-27 ENCOUNTER — ANTICOAGULATION THERAPY VISIT (OUTPATIENT)
Dept: NURSING | Facility: CLINIC | Age: 78
End: 2018-11-27
Payer: COMMERCIAL

## 2018-11-27 DIAGNOSIS — I48.0 PAROXYSMAL ATRIAL FIBRILLATION (H): ICD-10-CM

## 2018-11-27 DIAGNOSIS — Z79.01 LONG TERM CURRENT USE OF ANTICOAGULANT THERAPY: ICD-10-CM

## 2018-11-27 LAB — INR POINT OF CARE: 2.6 (ref 0.86–1.14)

## 2018-11-27 PROCEDURE — 85610 PROTHROMBIN TIME: CPT | Mod: QW

## 2018-11-27 PROCEDURE — 99207 ZZC NO CHARGE NURSE ONLY: CPT

## 2018-11-27 PROCEDURE — 36416 COLLJ CAPILLARY BLOOD SPEC: CPT

## 2018-11-27 NOTE — MR AVS SNAPSHOT
Fredrick Mehta   11/27/2018 3:30 PM   Anticoagulation Therapy Visit    Description:  77 year old male   Provider:   ANTICOAGULATION CLINIC   Department:  [facility] Nurse           INR as of 11/27/2018     Today's INR 2.6      Anticoagulation Summary as of 11/27/2018     INR goal 2.0-3.0   Today's INR 2.6   Full warfarin instructions 5 mg on Mon, Thu; 7.5 mg all other days   Next INR check 1/2/2019    Indications   Atrial fibrillation (H) [I48.91]  Long-term (current) use of anticoagulants [Z79.01] [Z79.01]         Your next Anticoagulation Clinic appointment(s)     Jan 02, 2019  2:30 PM CST   Anticoagulation Visit with  ANTICOAGULATION CLINIC   [facility] ([facility]    [address]  [place] [address]   [phone]              Contact Numbers     Clinic Number:         November 2018 Details    Sun Mon Tue Wed Thu Fri Sat         1               2               3                 4               5               6               7               8               9               10                 11               12               13               14               15               16               17                 18               19               20               21               22               23               24                 25               26               27      7.5 mg   See details      28      7.5 mg         29      5 mg         30      7.5 mg           Date Details   11/27 This INR check               How to take your warfarin dose     To take:  5 mg Take 1 of the 5 mg tablets.    To take:  7.5 mg Take 1.5 of the 5 mg tablets.           December 2018 Details    Sun Mon Tue Wed Thu Fri Sat           1      7.5 mg           2      7.5 mg         3      5 mg         4      7.5 mg         5      7.5 mg         6      5 mg         7      7.5 mg         8      7.5 mg           9      7.5 mg         10      5 mg         11      7.5 mg         12      7.5 mg         13      5 mg          14      7.5 mg         15      7.5 mg           16      7.5 mg         17      5 mg         18      7.5 mg         19      7.5 mg         20      5 mg         21      7.5 mg         22      7.5 mg           23      7.5 mg         24      5 mg         25      7.5 mg         26      7.5 mg         27      5 mg         28      7.5 mg         29      7.5 mg           30      7.5 mg         31      5 mg               Date Details   No additional details            How to take your warfarin dose     To take:  5 mg Take 1 of the 5 mg tablets.    To take:  7.5 mg Take 1.5 of the 5 mg tablets.           January 2019 Details    Sun Mon Tue Wed Thu Fri Sat       1      7.5 mg         2            3               4               5                 6               7               8               9               10               11               12                 13               14               15               16               17               18               19                 20               21               22               23               24               25               26                 27               28               29               30               31                  Date Details   No additional details    Date of next INR:  1/2/2019         How to take your warfarin dose     To take:  7.5 mg Take 1.5 of the 5 mg tablets.

## 2018-11-27 NOTE — TELEPHONE ENCOUNTER
Pt has not seen his PCP since 10/17/2017. Left detailed message for pt to call his doctor to schedule an appointment before he plans to go to [place] on January 4 th. This is to ensure that he is seen by a doctor at least once a year and to continue to receive anticoagulation services.

## 2018-11-27 NOTE — PROGRESS NOTES
ANTICOAGULATION FOLLOW-UP CLINIC VISIT    Patient Name:  Fredrick Mehta  Date:  11/27/2018  Contact Type:  Face to Face    SUBJECTIVE:     Patient Findings     Positives No Problem Findings           OBJECTIVE    INR Protime   Date Value Ref Range Status   11/27/2018 2.6 (A) 0.86 - 1.14 Final       ASSESSMENT / PLAN  INR assessment THER    Recheck INR In: 6 WEEKS    INR Location Clinic      Anticoagulation Summary as of 11/27/2018     INR goal 2.0-3.0   Today's INR 2.6   Warfarin maintenance plan 5 mg (5 mg x 1) on Mon, Thu; 7.5 mg (5 mg x 1.5) all other days   Full warfarin instructions 5 mg on Mon, Thu; 7.5 mg all other days   Weekly warfarin total 47.5 mg   No change documented Maryjane Grey RN   Plan last modified Jessica Coker RN (10/20/2017)   Next INR check 1/2/2019   Target end date     Indications   Atrial fibrillation (H) [I48.91]  Long-term (current) use of anticoagulants [Z79.01] [Z79.01]         Anticoagulation Episode Summary     INR check location [facility]    Preferred lab     Send INR reminders to [facility] ANTICOAGULATION    Comments Patient having INR drawn through [facility] through May 2018. Reach patient [phone].      Anticoagulation Care Providers     Provider Role Specialty Phone number    [facility], Yobani Paniagua MD Referring Cardiology [phone]    Pieter Ulloa MD Responsible Family Practice [phone]            See the Encounter Report to view Anticoagulation Flowsheet and Dosing Calendar (Go to Encounters tab in chart review, and find the Anticoagulation Therapy Visit)    Pt is 2.6 today. Pt is here with his wife. Pt and his wife advised to have pt continue current maintenance dose of 5 mg on Monday, Thursday and 7.5 mg all the other days. Recheck in 6 weeks. Pt denies any changes in health,medication,diet or activity. Fredrick and his wife aware if signs of clotting (pain, tenderness, swelling, color change in leg or arm, SOB) and bleeding occur (blood  in stool, urine, large bruising, bleeding gums, nosebleeds) to have INR check sooner. If sx severe report to ER or concerned for stroke call 911. If general questions or concerns arise, call clinic.         Maryjane Grey RN

## 2018-12-12 ENCOUNTER — OFFICE VISIT (OUTPATIENT)
Dept: OPHTHALMOLOGY | Facility: CLINIC | Age: 78
End: 2018-12-12
Attending: OPHTHALMOLOGY
Payer: MEDICARE

## 2018-12-12 DIAGNOSIS — H40.1132 PRIMARY OPEN ANGLE GLAUCOMA OF BOTH EYES, MODERATE STAGE: Primary | ICD-10-CM

## 2018-12-12 PROCEDURE — 92133 CPTRZD OPH DX IMG PST SGM ON: CPT | Mod: ZF | Performed by: OPHTHALMOLOGY

## 2018-12-12 PROCEDURE — G0463 HOSPITAL OUTPT CLINIC VISIT: HCPCS | Mod: ZF

## 2018-12-12 ASSESSMENT — VISUAL ACUITY
OS_CC+: -2
METHOD: SNELLEN - LINEAR
OS_CC: 20/20
OD_CC: 20/20
CORRECTION_TYPE: GLASSES
OD_CC+: -3

## 2018-12-12 ASSESSMENT — CUP TO DISC RATIO
OD_RATIO: 0.8
OS_RATIO: 0.8

## 2018-12-12 ASSESSMENT — CONF VISUAL FIELD
METHOD: COUNTING FINGERS
OD_NORMAL: 1
OS_NORMAL: 1

## 2018-12-12 ASSESSMENT — TONOMETRY
IOP_METHOD: APPLANATION
OD_IOP_MMHG: 16
OS_IOP_MMHG: 14

## 2018-12-12 ASSESSMENT — SLIT LAMP EXAM - LIDS
COMMENTS: NORMAL
COMMENTS: NORMAL

## 2018-12-12 NOTE — PROGRESS NOTES
Primary open angle glaucoma (POAG)  mod  Selected laser trabeculoplasty (SLT) left eye 8-1-14    HPI: Overall feels vision stable, no significant changes since last visit in 12/2017.  Reports good drop compliance.    Current eye meds:  Travatan at bedtime both eyes    OCT retinal nerve fiber layer stable both eyes      Assessment:  1. Primary open angle glaucoma (POAG), moderate.   S/p Selected laser trabeculoplasty (SLT) right eye 2007   S/p Selected laser trabeculoplasty (SLT) left eye Aug 2014    Using Travatan at bedtime both eyes   Octopus 24-2 stable - fluctuations but overall stable both eyes    2. Cataract both eyes still 20/20 but increasingly dense   Patient feels he sees well    3. Narrow angles    S/p periperal iridotomy both eyes - patent    Plan:  Visual fields today relatively stable , some fluctuation, worsening MD probably due to cataract and small pupil  IOPs good today on current regimen  Return in 6 months for OCT retinal nerve fiber layer and dilation. Goes to [place] for the winter  Continue Travatan at bedtime, both eyes    Attending Physician Attestation:  Complete documentation of historical and exam elements from today's encounter can be found in the full encounter summary report (not reduplicated in this progress note). I personally obtained the chief complaint(s) and history of present illness. I confirmed and edited asnecessary the review of systems, past medical/surgical history, family history, social history, and examination findings as documented by others; and I examined the patient myself. I personally reviewed the relevant tests, images, and reports as documented above. I formulated and edited as necessary the assessment and plan and discussed the findings and management plan with the patient and family.  - Kim Joel MD 4:38 PM 12/12/2018

## 2018-12-13 DIAGNOSIS — Z79.01 LONG TERM CURRENT USE OF ANTICOAGULANT THERAPY: ICD-10-CM

## 2018-12-13 RX ORDER — WARFARIN SODIUM 5 MG/1
TABLET ORAL
Qty: 60 TABLET | Refills: 0 | Status: SHIPPED | OUTPATIENT
Start: 2018-12-13 | End: 2019-02-07

## 2018-12-13 NOTE — TELEPHONE ENCOUNTER
Last time prescribed: 9/12/18 135 tabs x 0 refills  Last office visit: 10/24/17  Next appointment: No Future Appointment Scheduled    PT is followed by coumadin clinic, and is UTD with his INR's  He is overdue for clinic visit.    Medication is being filled for 1 time refill only due to:  Patient needs to be seen because it has been more than one year since last visit.     Marie Amador RN  December 13, 2018 8:12 PM

## 2018-12-18 DIAGNOSIS — E78.2 MIXED HYPERLIPIDEMIA: ICD-10-CM

## 2018-12-18 RX ORDER — PRAVASTATIN SODIUM 20 MG
TABLET ORAL
Qty: 90 TABLET | Refills: 0 | Status: SHIPPED | OUTPATIENT
Start: 2018-12-18 | End: 2019-02-28

## 2018-12-18 NOTE — TELEPHONE ENCOUNTER
Last time prescribed: 9/14/18 90 tabs x 0 refills  Last office visit: 10/24/17  Next appointment: No Future Appointment Scheduled    Medication is being filled for 1 time refill only due to:  Patient needs labs lipid panel plus. Future labs ordered yes. Patient needs to be seen because it has been more than one year since last visit.     Marie Amador RN  December 18, 2018 6:15 PM

## 2018-12-21 DIAGNOSIS — E03.9 HYPOTHYROIDISM, UNSPECIFIED TYPE: ICD-10-CM

## 2018-12-21 RX ORDER — LEVOTHYROXINE SODIUM 50 UG/1
50 TABLET ORAL DAILY
Qty: 90 TABLET | Refills: 0 | Status: SHIPPED | OUTPATIENT
Start: 2018-12-21 | End: 2019-02-28

## 2018-12-21 NOTE — TELEPHONE ENCOUNTER
Last office visit was on 10/24/2017, no future visits scheduled.     Routing refill request to provider for review/approval because:  Salma given x1 and patient did not follow up, please advise  Labs not current:  Thyroid panel, futures placed   Patient needs to be seen because it has been more than 1 year since last office visit.    Marie Amador RN  December 21, 2018 12:38 PM

## 2019-01-02 ENCOUNTER — ANTICOAGULATION THERAPY VISIT (OUTPATIENT)
Dept: NURSING | Facility: CLINIC | Age: 79
End: 2019-01-02
Payer: COMMERCIAL

## 2019-01-02 DIAGNOSIS — E03.9 HYPOTHYROIDISM, UNSPECIFIED TYPE: ICD-10-CM

## 2019-01-02 DIAGNOSIS — E78.2 MIXED HYPERLIPIDEMIA: ICD-10-CM

## 2019-01-02 DIAGNOSIS — I48.91 ATRIAL FIBRILLATION (H): ICD-10-CM

## 2019-01-02 LAB — INR POINT OF CARE: 2.2 (ref 0.86–1.14)

## 2019-01-02 PROCEDURE — 85610 PROTHROMBIN TIME: CPT | Mod: QW

## 2019-01-02 PROCEDURE — 84443 ASSAY THYROID STIM HORMONE: CPT | Performed by: FAMILY MEDICINE

## 2019-01-02 PROCEDURE — 80061 LIPID PANEL: CPT | Performed by: FAMILY MEDICINE

## 2019-01-02 PROCEDURE — 36416 COLLJ CAPILLARY BLOOD SPEC: CPT

## 2019-01-02 NOTE — PROGRESS NOTES
ANTICOAGULATION FOLLOW-UP CLINIC VISIT    Patient Name:  Fredrick Mehta  Date:  2019  Contact Type:  Face to Face    SUBJECTIVE:     Patient Findings     Positives:   No Problem Findings           OBJECTIVE    INR Protime   Date Value Ref Range Status   2019 2.2 (A) 0.86 - 1.14 Final       ASSESSMENT / PLAN  INR assessment THER    Recheck INR In: 6 WEEKS    INR Location Outside lab      Anticoagulation Summary  As of 2019    INR goal:   2.0-3.0   TTR:   84.9 % (1.3 y)   INR used for dosin.2 (2019)   Warfarin maintenance plan:   5 mg (5 mg x 1) every Mon, Thu; 7.5 mg (5 mg x 1.5) all other days   Full warfarin instructions:   5 mg every Mon, Thu; 7.5 mg all other days   Weekly warfarin total:   47.5 mg   No change documented:   Maryjane Grey RN   Plan last modified:   Jessica Coker RN (10/20/2017)   Next INR check:   2019   Target end date:       Indications    Atrial fibrillation (H) [I48.91]  Long-term (current) use of anticoagulants [Z79.01] [Z79.01]             Anticoagulation Episode Summary     INR check location[facility]   [facility]    Preferred lab:       Send INR reminders to:    ANTICOAGULATION    Comments:   Patient having INR drawn through [facility] through May 2018. Reach patient [phone].      Anticoagulation Care Providers     Provider Role Specialty Phone number    Yobani Sarkar MD Referring Cardiology [phone]    Pieter Ulloa MD Responsible Family Practice [phone]            See the Encounter Report to view Anticoagulation Flowsheet and Dosing Calendar (Go to Encounters tab in chart review, and find the Anticoagulation Therapy Visit)    Pt is 2.2 today. Pt is here with his wife today. Will have pt maintenance dosing of 5 mg on  and  and 7.5 mg all the other days. Pt leaving for [place], [place] on 19 and will be gone until 2019. Standing INR order printed out for pt to bring to [facility] in [place]. Pt next INR check should be on or around 2/13/19. Once [facility] faxes the INR to us, then please call the pt in [place] on their cell phone at [phone]. Pt denies any changes in medication, diet or health.    Maryjane Grey RN

## 2019-01-03 LAB
CHOLEST SERPL-MCNC: 207 MG/DL
HDLC SERPL-MCNC: 57 MG/DL
LDLC SERPL CALC-MCNC: 117 MG/DL
NONHDLC SERPL-MCNC: 150 MG/DL
TRIGL SERPL-MCNC: 165 MG/DL
TSH SERPL DL<=0.005 MIU/L-ACNC: 2.25 MU/L (ref 0.4–4)

## 2019-01-16 DIAGNOSIS — I48.20 CHRONIC ATRIAL FIBRILLATION (H): ICD-10-CM

## 2019-01-18 RX ORDER — DILTIAZEM HYDROCHLORIDE 240 MG/1
240 CAPSULE, COATED, EXTENDED RELEASE ORAL 2 TIMES DAILY
Qty: 180 CAPSULE | Refills: 3 | Status: SHIPPED | OUTPATIENT
Start: 2019-01-18 | End: 2019-01-21

## 2019-01-21 DIAGNOSIS — I48.20 CHRONIC ATRIAL FIBRILLATION (H): ICD-10-CM

## 2019-01-22 RX ORDER — DILTIAZEM HYDROCHLORIDE 240 MG/1
CAPSULE, COATED, EXTENDED RELEASE ORAL
Qty: 180 CAPSULE | Refills: 3 | Status: SHIPPED | OUTPATIENT
Start: 2019-01-22 | End: 2019-04-24

## 2019-02-07 DIAGNOSIS — Z79.01 LONG TERM CURRENT USE OF ANTICOAGULANT THERAPY: ICD-10-CM

## 2019-02-07 RX ORDER — WARFARIN SODIUM 5 MG/1
TABLET ORAL
Qty: 135 TABLET | Refills: 0 | Status: SHIPPED | OUTPATIENT
Start: 2019-02-07 | End: 2019-04-24

## 2019-02-07 NOTE — TELEPHONE ENCOUNTER
Last office visit was on 10/24/2017, next visit is scheduled for 04/23/2019 with Dr. Ulloa.    Medication is being filled for 1 time refill only due to:  Patient needs to be seen because it has been more than one year since last visit. has appt set up in April with Dr Ulloa.   Meche Tse RN  [facility]

## 2019-02-09 DIAGNOSIS — E78.2 MIXED HYPERLIPIDEMIA: ICD-10-CM

## 2019-02-12 LAB — INR PPP: 2.2

## 2019-02-12 RX ORDER — PRAVASTATIN SODIUM 10 MG
TABLET ORAL
Qty: 90 TABLET | Refills: 0 | OUTPATIENT
Start: 2019-02-12

## 2019-02-13 ENCOUNTER — ANTICOAGULATION THERAPY VISIT (OUTPATIENT)
Dept: FAMILY MEDICINE | Facility: CLINIC | Age: 79
End: 2019-02-13

## 2019-02-13 RX ORDER — PRAVASTATIN SODIUM 10 MG
10 TABLET ORAL DAILY
Qty: 90 TABLET | Refills: 0 | Status: SHIPPED | OUTPATIENT
Start: 2019-02-13 | End: 2019-04-24

## 2019-02-13 NOTE — PROGRESS NOTES
ANTICOAGULATION FOLLOW-UP CLINIC VISIT    Patient Name:  Fredrick Mehta  Date:  2019  Contact Type:  Telephone/Outside lab    SUBJECTIVE:     Patient Findings     Positives:   No Problem Findings           OBJECTIVE    INR   Date Value Ref Range Status   2019 2.2  Final       ASSESSMENT / PLAN  INR assessment THER    Recheck INR In: 6 WEEKS    INR Location Outside lab      Anticoagulation Summary  As of 2019    INR goal:   2.0-3.0   TTR:   86.1 % (1.4 y)   INR used for dosin.2 (2019)   Warfarin maintenance plan:   5 mg (5 mg x 1) every Mon, Thu; 7.5 mg (5 mg x 1.5) all other days   Full warfarin instructions:   5 mg every Mon, Thu; 7.5 mg all other days   Weekly warfarin total:   47.5 mg   No change documented:   Maryjane Grey RN   Plan last modified:   Jessica Coker RN (10/20/2017)   Next INR check:   3/26/2019   Target end date:       Indications    Atrial fibrillation (H) [I48.91]  Long-term (current) use of anticoagulants [Z79.01] [Z79.01]             Anticoagulation Episode Summary     INR check location[facility]   [facility]    Preferred lab:       Send INR reminders to:    ANTICOAGULATION    Comments:   Patient having INR drawn through [facility] [place] through May 2018. Reach patient [phone].      Anticoagulation Care Providers     Provider Role Specialty Phone number    Yobani Sarkar MD Referring Cardiology [phone]    Pieter Ulloa MD Responsible Family Practice [phone]            See the Encounter Report to view Anticoagulation Flowsheet and Dosing Calendar (Go to Encounters tab in chart review, and find the Anticoagulation Therapy Visit)    Pt is 2.2 per [facility] on 19. Pt and his wife called at [phone] and advised to continue maintenance dose of 5 mg  and  and 7.5 mg all the other days. Recheck in 6 weeks which would be on or around 3/26/19. Pt's wife and pt aware if signs of clotting (pain,  tenderness, swelling, color change in leg or arm, SOB) and bleeding occur (blood in stool, urine, large bruising, bleeding gums, nosebleeds) to have INR check sooner. If sx severe report to ER or concerned for stroke call 911. If general questions or concerns arise, call clinic.         Maryjane Grey RN

## 2019-02-13 NOTE — TELEPHONE ENCOUNTER
Last Office Visit: 10/2017  [facility] Appointments: 4/22/19 with Dr. Ulloa    Medication is being filled for 1 time refill only due to:  Patient needs to be seen because it has been more than one year since last visit. He must keep upcoming appointment.     Ana Rosa Hubbard RN  02/13/19  11:26 AM

## 2019-02-28 DIAGNOSIS — E78.2 MIXED HYPERLIPIDEMIA: ICD-10-CM

## 2019-02-28 DIAGNOSIS — E03.9 HYPOTHYROIDISM, UNSPECIFIED TYPE: ICD-10-CM

## 2019-02-28 RX ORDER — PRAVASTATIN SODIUM 20 MG
TABLET ORAL
Qty: 90 TABLET | Refills: 0 | Status: SHIPPED | OUTPATIENT
Start: 2019-02-28 | End: 2019-04-24

## 2019-02-28 RX ORDER — LEVOTHYROXINE SODIUM 50 UG/1
50 TABLET ORAL DAILY
Qty: 90 TABLET | Refills: 0 | Status: SHIPPED | OUTPATIENT
Start: 2019-02-28 | End: 2019-05-31

## 2019-02-28 NOTE — TELEPHONE ENCOUNTER
Last Office Visit: 10/17/17  [facility] [place] 4/23/17  Labs: 1/2/19    Medication is being filled for 1 time refill only due to:  Needs to keep upcoming office visit  Ana Rosa Hubbard RN  02/28/19  3:13 PM

## 2019-04-24 ENCOUNTER — OFFICE VISIT (OUTPATIENT)
Dept: FAMILY MEDICINE | Facility: CLINIC | Age: 79
End: 2019-04-24
Payer: COMMERCIAL

## 2019-04-24 VITALS
SYSTOLIC BLOOD PRESSURE: 118 MMHG | BODY MASS INDEX: 24.95 KG/M2 | HEART RATE: 64 BPM | DIASTOLIC BLOOD PRESSURE: 66 MMHG | HEIGHT: 70 IN | OXYGEN SATURATION: 95 % | TEMPERATURE: 98 F | WEIGHT: 174.25 LBS

## 2019-04-24 DIAGNOSIS — Z00.00 MEDICARE ANNUAL WELLNESS VISIT, SUBSEQUENT: Primary | ICD-10-CM

## 2019-04-24 DIAGNOSIS — E78.00 HYPERCHOLESTEROLEMIA: ICD-10-CM

## 2019-04-24 DIAGNOSIS — Z12.5 SCREENING FOR PROSTATE CANCER: ICD-10-CM

## 2019-04-24 DIAGNOSIS — N39.498 OTHER URINARY INCONTINENCE: ICD-10-CM

## 2019-04-24 DIAGNOSIS — Z13.1 SCREENING FOR DIABETES MELLITUS: ICD-10-CM

## 2019-04-24 DIAGNOSIS — H61.22 IMPACTED CERUMEN OF LEFT EAR: ICD-10-CM

## 2019-04-24 DIAGNOSIS — E78.2 MIXED HYPERLIPIDEMIA: ICD-10-CM

## 2019-04-24 DIAGNOSIS — I48.20 CHRONIC ATRIAL FIBRILLATION (H): ICD-10-CM

## 2019-04-24 DIAGNOSIS — Z79.01 LONG TERM CURRENT USE OF ANTICOAGULANT THERAPY: ICD-10-CM

## 2019-04-24 LAB
BILIRUBIN UR: NEGATIVE
BLOOD UR: NEGATIVE
GLUCOSE URINE: NEGATIVE
INR PPP: 2.94 (ref 0.86–1.14)
KETONES UR QL: NEGATIVE
LEUKOCYTE ESTERASE UR: NEGATIVE
NITRITE UR QL STRIP: NEGATIVE
PH UR STRIP: 5.5 [PH] (ref 4.5–8)
PROTEIN UR: NEGATIVE
PSA SERPL-ACNC: <0.01 UG/L (ref 0–4)
SP GR UR STRIP: >=1.03 (ref 1–1.03)
UROBILINOGEN UR STRIP-ACNC: NORMAL

## 2019-04-24 RX ORDER — PRAVASTATIN SODIUM 10 MG
10 TABLET ORAL DAILY
Qty: 90 TABLET | Refills: 4 | Status: SHIPPED | OUTPATIENT
Start: 2019-04-24 | End: 2020-05-26

## 2019-04-24 RX ORDER — DILTIAZEM HYDROCHLORIDE 240 MG/1
240 CAPSULE, COATED, EXTENDED RELEASE ORAL 2 TIMES DAILY
Qty: 180 CAPSULE | Refills: 4 | Status: SHIPPED | OUTPATIENT
Start: 2019-04-24 | End: 2020-03-30

## 2019-04-24 RX ORDER — PRAVASTATIN SODIUM 20 MG
TABLET ORAL
Qty: 90 TABLET | Refills: 4 | Status: SHIPPED | OUTPATIENT
Start: 2019-04-24 | End: 2020-05-26

## 2019-04-24 RX ORDER — WARFARIN SODIUM 5 MG/1
TABLET ORAL
Qty: 135 TABLET | Refills: 4 | Status: SHIPPED | OUTPATIENT
Start: 2019-04-24 | End: 2020-05-04

## 2019-04-24 ASSESSMENT — MIFFLIN-ST. JEOR: SCORE: 1511.64

## 2019-04-24 NOTE — LETTER
[facility]  [address]  [place] [address]  Phone: [phone]  Fax: [phone]       Date: April 25, 2019      Fredrick Mehta  [address]  [place] [address]        Dear Fredrick,    It was great to see you this (W) afternoon.    Your urinalysis shows no evidence of a bladder infection--which is great news!    Of note, your urine was very concentrated.  Therefore, I think it would be a good idea for you to drink more fluids throughout the day.    Take care!    Pieter    Resulted Orders   Prostate spec antigen screen   Result Value Ref Range    PSA <0.01 0 - 4 ug/L      Comment:      Assay Method:  Chemiluminescence using Siemens Vista analyzer   Urinalysis ([place])   Result Value Ref Range    Specific Gravity Urine >=1.030 1.005 - 1.030    pH Urine 5.5 4.5 - 8.0    Leukocyte Esterase UR Negative Negative    Nitrite Urine Negative Negative    Protein UR Negative Negative    Glucose Urine Negative Negative    Ketones Urine Negative Negative    Urobilinogen mg/dL 0.2 E.U./dL 0.2 E.U./dL    Bilirubin UR Negative Negative    Blood UR Negative Negative   INR   Result Value Ref Range    INR 2.94 (H) 0.86 - 1.14

## 2019-04-24 NOTE — NURSING NOTE
"78 year old  Chief Complaint   Patient presents with     Physical     Pt needs and INR done. Pt needs diltiazem refilled.       Blood pressure 118/66, pulse 64, temperature 98  F (36.7  C), temperature source Oral, height 1.778 m (5' 10\"), weight 79 kg (174 lb 4 oz), SpO2 95 %. Body mass index is 25 kg/m .  Patient Active Problem List   Diagnosis     Atrial fibrillation (H)     Hypercholesterolemia     Preventative health care     Hypothyroidism     Glaucoma     Long-term (current) use of anticoagulants [Z79.01]     Chronic atrial fibrillation (H)     Acquired hypothyroidism     Memory loss     Primary open angle glaucoma of both eyes, moderate stage       Wt Readings from Last 2 Encounters:   19 79 kg (174 lb 4 oz)   18 76.2 kg (168 lb)     BP Readings from Last 3 Encounters:   19 118/66   18 116/64   18 116/66         Current Outpatient Medications   Medication     Acetaminophen (TYLENOL PO)     Cetirizine HCl (ZYRTEC ALLERGY PO)     diltiazem ER COATED BEADS (CARDIZEM CD/CARTIA XT) 240 MG 24 hr capsule     donepezil (ARICEPT) 10 MG tablet     IBUPROFEN PO     levothyroxine (SYNTHROID/LEVOTHROID) 50 MCG tablet     memantine (NAMENDA) 5 MG tablet     pravastatin (PRAVACHOL) 10 MG tablet     pravastatin (PRAVACHOL) 20 MG tablet     Travoprost 0.004 % SOLN     warfarin (COUMADIN) 5 MG tablet     No current facility-administered medications for this visit.        Social History     Tobacco Use     Smoking status: Former Smoker     Packs/day: 1.00     Types: Cigarettes     Start date:      Last attempt to quit: 1986     Years since quittin.3     Smokeless tobacco: Never Used   Substance Use Topics     Alcohol use: No     Drug use: No       Health Maintenance Due   Topic Date Due     ADVANCE DIRECTIVE PLANNING Q5 YRS  1994     ZOSTER IMMUNIZATION (2 of 3) 2012     OP ANNUAL INR REFERRAL  10/16/2016     MEDICARE ANNUAL WELLNESS VISIT  10/17/2018     PHQ-2  2019 "       No results found for: PAP      April 24, 2019 1:37 PM

## 2019-04-24 NOTE — LETTER
[facility]  [address]  [place] [address]  Phone: [phone]  Fax: [phone]       Date: April 25, 2019      Fredrick Mehta  [address]  [place] [facility] [address]        Lexa Alcala,    I'm very happy to report that your PSA (prostate) test is perfectly low and normal.    Take care!    Pieter    Resulted Orders   Prostate spec antigen screen   Result Value Ref Range    PSA <0.01 0 - 4 ug/L      Comment:      Assay Method:  Chemiluminescence using Siemens Vista analyzer   Urinalysis ([place])   Result Value Ref Range    Specific Gravity Urine >=1.030 1.005 - 1.030    pH Urine 5.5 4.5 - 8.0    Leukocyte Esterase UR Negative Negative    Nitrite Urine Negative Negative    Protein UR Negative Negative    Glucose Urine Negative Negative    Ketones Urine Negative Negative    Urobilinogen mg/dL 0.2 E.U./dL 0.2 E.U./dL    Bilirubin UR Negative Negative    Blood UR Negative Negative   INR   Result Value Ref Range    INR 2.94 (H) 0.86 - 1.14

## 2019-05-02 ENCOUNTER — TELEPHONE (OUTPATIENT)
Dept: FAMILY MEDICINE | Facility: CLINIC | Age: 79
End: 2019-05-02

## 2019-05-02 NOTE — TELEPHONE ENCOUNTER
Please advise - what were you recommending for incontinence?  Meche Tse RN  [facility]

## 2019-05-02 NOTE — TELEPHONE ENCOUNTER
[facility] Call Center    Phone Message    May a detailed message be left on voicemail: yes    Reason for Call: Other: Patient's wife calling.  Patient was in to see Laila 4/24 and at that visit Dr. Ulloa told them he would prescribe something for incontinence, bladder leakage.  Nothing was sent to [facility].  Please check and let patient's wife know when prescription sent.       Action Taken: Message routed to:  [facility]: DOMINGUEZ

## 2019-05-03 DIAGNOSIS — N32.81 OVERACTIVE BLADDER: Primary | ICD-10-CM

## 2019-05-03 RX ORDER — TOLTERODINE 4 MG/1
4 CAPSULE, EXTENDED RELEASE ORAL DAILY
Qty: 30 CAPSULE | Refills: 1 | Status: SHIPPED | OUTPATIENT
Start: 2019-05-03 | End: 2020-04-17

## 2019-05-03 NOTE — TELEPHONE ENCOUNTER
Let's give him Detrol XL 2 mg once daily, #30, 1 RF.  (Dx: Overactive bladder)     Have him take it for 2 weeks and then have his wife report back to me.     Thanks!     Pieter     Script to [facility] at [address] , for detrol XL. Left detailed message for pt and wife re: this - they are to call back prn.  Meche Tse RN  [facility]

## 2019-05-06 ENCOUNTER — TRANSFERRED RECORDS (OUTPATIENT)
Dept: HEALTH INFORMATION MANAGEMENT | Facility: CLINIC | Age: 79
End: 2019-05-06

## 2019-05-06 PROBLEM — Z00.00 MEDICARE ANNUAL WELLNESS VISIT, SUBSEQUENT: Status: ACTIVE | Noted: 2019-05-06

## 2019-05-06 PROBLEM — N39.498 OTHER URINARY INCONTINENCE: Status: ACTIVE | Noted: 2019-05-06

## 2019-05-06 NOTE — PROGRESS NOTES
CHIEF COMPLAINT:  Medicare annual wellness visit, subsequent.      HISTORY OF PRESENT ILLNESS:  Fredrick is a 78-year-old gentleman accompanied by his wife to today's visit.  Overall, he is doing quite well.  His affect was extremely upbeat.  He answered questions appropriately.  He had a great sense of humor and laughed a lot.  However, it is also clear that he has some memory loss, which is well known.      ACTIVE MEDICAL PROBLEMS:  Reviewed, and these include atrial fibrillation, hyperlipidemia, hypothyroidism, glaucoma, chronic atrial fibrillation with the use of anticoagulants and the memory loss.      CURRENT MEDICATIONS:  Reviewed.      SOCIAL, FAMILY AND PAST SURGICAL HISTORIES:  Reviewed.  No changes to report.      MEDICARE QUESTIONS:  He is able to still do most all activities of daily living that he would like to do.  He has had no falls at home in the last year.  No symptoms of depression in the last month.  Memory loss issues are well known.      HEALTHCARE MAINTENANCE:  From an eye care standpoint, he is up to date.  Hearing seems fine.  We will check, as there is some question of possible wax.  Dental care is up to date.  Blood pressure is outstanding at 118/66.  BMI is perfect at 25.  Weight is up about 6 pounds from 08/2018.  He carries it well.      IMMUNIZATIONS:  Up to date.        Labs will consist of a PSA, urinalysis and an INR.  He had a lipid panel and TSH done in January.  Up to date with colon cancer screening, and after next year he really will not need more.       REVIEW OF SYSTEMS:  A 10 point review of systems reviewed.      OBJECTIVE:     GENERAL:  Fredrick is in absolutely no distress.  Affect is very upbeat as I reported above.  He is alert and oriented to person and place and to time to a large degree.   VITAL SIGNS:  BP is 118/66 with a heart rate of 64 and regular.  Temperature is 98 degrees.  He is 5 feet 10 and weighs 174 pounds 4 ounces, giving him a BMI of exactly 25.  O2 sats are  95% on room air.     HEENT:  Head is normocephalic, atraumatic.  Right ear reveals no cerumen, but the left ear reveals virtual complete impaction.  This might be affecting his hearing a bit.  Eyes are grossly normal.  There is no pain with palpation of the frontal or maxillary sinuses.  Nose is free of any congestion or discharge.  Teeth are in good repair.  Mucous members are moist.  Throat looks benign.   NECK:  Supple without adenopathy or thyromegaly.  No carotid bruits are appreciated.   BACK:  Smooth and straight.  No pain to percussion.  No CVA tenderness.   LUNGS:  Clear to auscultation bilaterally.   HEART:  Regular rate and rhythm without murmur.   ABDOMEN:  Benign.   EXTREMITIES:  Ankles are free of any edema.  Skin is warm and dry.  Good peripheral pulses.      LABORATORY:  Pending.      ASSESSMENT/PLAN:   1.  Medicare annual wellness visit, subsequent.  Up to date with healthcare maintenance items, including immunizations.   2.  History of hyperlipidemia, on statin.  Lipid panel plus previously done looked great.  No need to repeat that.   3.  Screening for prostate cancer with a PSA 50.   4.  Chronic AFib, on warfarin.  INR pending.   5.  He is having some issues with what sounds like bladder spasm.  He generally does not have any problems controlling his bladder and certainly no problems controlling his bowels.  However, when he has to go, he has almost no time to think about it.  We are going to go ahead and try some Detrol (tolterodine), extended release, 4 mg once daily.  We will see how he does with that, and they will report back to us.  I will check a urinalysis just to make sure there is no sign of a urinary tract infection.  PSA 50 to some extent for the same purpose.   6.  Memory loss, likely Alzheimer.  He is on Aricept (donepezil) 10 mg nightly.  He is also on Namenda (memantine) 5 mg daily.  We are going to hold off increasing those doses so that we have something to work with.  His wife  fully agrees with that.   7.  Call with any problems or questions.  I look forward to seeing him back in approximately 1 year.

## 2019-05-16 ENCOUNTER — TELEPHONE (OUTPATIENT)
Dept: OPHTHALMOLOGY | Facility: CLINIC | Age: 79
End: 2019-05-16

## 2019-05-16 NOTE — TELEPHONE ENCOUNTER
Note to  for assistance with records request  Jonathan Goodson RN 2:44 PM 05/16/19        [facility] Call Center    Phone Message    May a detailed message be left on voicemail: yes    Reason for Call: Other: Pt's wife stated she would like Dr. Joel records to be sent by fax to the [facility] eye clinic at [phone]     Action Taken: Message routed to:  [facility] Eye

## 2019-05-16 NOTE — TELEPHONE ENCOUNTER
Called pharmacy and verified they received the Rx for Detrol sent 5/3/19 and pharmacist confirmed this resides with patient's profile. Called patient's wife Rosalva and informed her that the pharmacy did receive the Rx, and advised her to call to fill this. If there is a problem, please have pharmacy call the clinic.  All questions answered and patient's wife agrees with this plan.   Ana Rosa Hubbard RN  05/16/19  10:40 AM

## 2019-05-16 NOTE — TELEPHONE ENCOUNTER
[facility] Call Center    Phone Message    May a detailed message be left on voicemail: yes    Reason for Call: Other: Pt's wife said [facility] never recieved the perscription. Please call pt's wife to let her know what's going on.          [facility] [id number] [facility] [address] [place] [address] AT [facility]      Action Taken: Message routed to:  [facility]: DOMINGUEZ

## 2019-05-31 ENCOUNTER — TELEPHONE (OUTPATIENT)
Dept: FAMILY MEDICINE | Facility: CLINIC | Age: 79
End: 2019-05-31

## 2019-05-31 DIAGNOSIS — E03.9 HYPOTHYROIDISM, UNSPECIFIED TYPE: ICD-10-CM

## 2019-05-31 DIAGNOSIS — N32.81 OVERACTIVE BLADDER: Primary | ICD-10-CM

## 2019-05-31 RX ORDER — LEVOTHYROXINE SODIUM 50 UG/1
50 TABLET ORAL DAILY
Qty: 90 TABLET | Refills: 3 | Status: SHIPPED | OUTPATIENT
Start: 2019-05-31 | End: 2020-05-04

## 2019-05-31 NOTE — TELEPHONE ENCOUNTER
Health Call Center    Phone Message    May a detailed message be left on voicemail: yes    Reason for Call: Medication Question or concern regarding medication   Prescription Clarification  Name of Medication: tolterodine ER (DETROL LA) 4 MG 24 hr capsule  Prescribing Provider:    Pharmacy: n/a   What on the order needs clarification? Medication has helped but hasn't cured the problem. Looking for next steps.          Action Taken: Message routed to:  [facility]: [email or web address]   [facility]    Date of Study: 8/29/18    The following information was gathered from the patients study log:    · Lights off: 10:30P  · Estimated sleep onset: 10:45P    · Awakened a total of 4-5 times  · The patient felt they slept 5.5 hours  · Patient took a sleep aid before starting the test  · Sleep quality was worse compared to a usual nights sleep. Further information provided: \"I woke up due to equipment. \"

## 2019-05-31 NOTE — TELEPHONE ENCOUNTER
Last time prescribed: 2/28/19 , 90 tabs/caps x 0 refills  Last office visit: 4/24/19  Next appointment: No future appointments  Last labs: 1/10/19    Prescription approved per [facility] Refill Protocol.  Ana Rosa Hubbard RN  05/31/19  12:38 PM

## 2019-06-03 ENCOUNTER — ANTICOAGULATION THERAPY VISIT (OUTPATIENT)
Dept: NURSING | Facility: CLINIC | Age: 79
End: 2019-06-03
Payer: COMMERCIAL

## 2019-06-03 LAB — INR POINT OF CARE: 2.7 (ref 0.86–1.14)

## 2019-06-03 PROCEDURE — 36416 COLLJ CAPILLARY BLOOD SPEC: CPT

## 2019-06-03 PROCEDURE — 99207 ZZC NO CHARGE NURSE ONLY: CPT

## 2019-06-03 PROCEDURE — 85610 PROTHROMBIN TIME: CPT | Mod: QW

## 2019-06-03 NOTE — PROGRESS NOTES
ANTICOAGULATION FOLLOW-UP CLINIC VISIT    Patient Name:  Fredrick Mehta  Date:  6/3/2019  Contact Type:  Face to Face    SUBJECTIVE:  Patient Findings         Clinical Outcomes     Negatives:   Major bleeding event, Thromboembolic event, Anticoagulation-related hospital admission, Anticoagulation-related ED visit, Anticoagulation-related fatality           OBJECTIVE    INR Protime   Date Value Ref Range Status   2019 2.7 (A) 0.86 - 1.14 Final       ASSESSMENT / PLAN  INR assessment THER    Recheck INR In: 6 WEEKS    INR Location Clinic      Anticoagulation Summary  As of 6/3/2019    INR goal:   2.0-3.0   TTR:   88.6 % (1.7 y)   INR used for dosin.7 (6/3/2019)   Warfarin maintenance plan:   5 mg (5 mg x 1) every Mon, Thu; 7.5 mg (5 mg x 1.5) all other days   Full warfarin instructions:   5 mg every Mon, Thu; 7.5 mg all other days   Weekly warfarin total:   47.5 mg   No change documented:   Maryjane Grey RN   Plan last modified:   Jessica Coker RN (10/20/2017)   Next INR check:   7/15/2019   Target end date:       Indications    Atrial fibrillation (H) [I48.91]  Long-term (current) use of anticoagulants [Z79.01] [Z79.01]             Anticoagulation Episode Summary     INR check location:   Anticoagulation Clinic    Preferred lab:       Send INR reminders to:    ANTICOAGULATION    Comments:   Patient having INR drawn through [employer] through May 2018. Reach patient [phone].      Anticoagulation Care Providers     Provider Role Specialty Phone number    Yobani Sarkar MD Referring Cardiology [phone]    Pieter Ulloa MD Responsible Family Practice [phone]            See the Encounter Report to view Anticoagulation Flowsheet and Dosing Calendar (Go to Encounters tab in chart review, and find the Anticoagulation Therapy Visit)    Pt INR was 2.7 today. Pt advised to continue maintenance dose of 5 mg on ,  and 7.5 mg all the other days.  Recheck in 6 weeks. Pt's wife is here for the visit as well. Pt denies any changes in health,diet,activity or medication. Pt's wife aware if signs of clotting (pain, tenderness, swelling, color change in leg or arm, SOB) and bleeding occur (blood in stool, urine, large bruising, bleeding gums, nosebleeds) to have INR check sooner. If sx severe report to ER or concerned for stroke call 911. If general questions or concerns arise, call clinic.         Maryjane Grey RN

## 2019-06-19 ENCOUNTER — PRE VISIT (OUTPATIENT)
Dept: UROLOGY | Facility: CLINIC | Age: 79
End: 2019-06-19

## 2019-06-19 NOTE — TELEPHONE ENCOUNTER
MEDICAL RECORDS REQUEST   [facility]  Urology Clinic  [address] [place] [address]  PHONE: [phone]  Fax: [phone]        FUTURE VISIT INFORMATION                                                   JIMMY Painter: 1940 scheduled for future visit at [facility]    APPOINTMENT INFORMATION:    Date: 19 2PM    Provider:  Josh Small MD     Reason for Visit/Diagnosis: Overactive bladder    REFERRAL INFORMATION:    Referring provider:  Pieter Ulloa     Specialty: MD    Referring providers clinic:  [facility]    Clinic contact number:  [phone]    RECORDS REQUESTED FOR VISIT                                                     NOTES  STATUS/DETAILS   OFFICE NOTE from referring provider  yes   OFFICE NOTE from other specialist  no   DISCHARGE SUMMARY from hospital  no   DISCHARGE REPORT from the ER  no   OPERATIVE REPORT  no   MEDICATION LIST  no   LABS     URINALYSIS (UA)  yes     PRE-VISIT CHECKLIST      Record collection complete Yes - All recs in Epic    Appointment appropriately scheduled           (right time/right provider) Yes   MyChart activation If no, please explain: In process    Questionnaire complete If no, please explain: In process      Completed by: Shahida Ugarte

## 2019-07-15 ENCOUNTER — ANTICOAGULATION THERAPY VISIT (OUTPATIENT)
Dept: NURSING | Facility: CLINIC | Age: 79
End: 2019-07-15
Payer: COMMERCIAL

## 2019-07-15 LAB — INR POINT OF CARE: 2.6 (ref 0.86–1.14)

## 2019-07-15 PROCEDURE — 85610 PROTHROMBIN TIME: CPT | Mod: QW

## 2019-07-15 PROCEDURE — 99207 ZZC NO CHARGE NURSE ONLY: CPT

## 2019-07-15 PROCEDURE — 36416 COLLJ CAPILLARY BLOOD SPEC: CPT

## 2019-07-15 NOTE — PROGRESS NOTES
ANTICOAGULATION FOLLOW-UP CLINIC VISIT    Patient Name:  Fredrick Mehta  Date:  7/15/2019  Contact Type:  Face to Face    SUBJECTIVE:  Patient Findings     Comments:   The patient was assessed for diet, medication, and activity level changes, missed or extra doses, bruising or bleeding, with no problem findings.        Clinical Outcomes     Negatives:   Major bleeding event, Thromboembolic event, Anticoagulation-related hospital admission, Anticoagulation-related ED visit, Anticoagulation-related fatality    Comments:   The patient was assessed for diet, medication, and activity level changes, missed or extra doses, bruising or bleeding, with no problem findings.           OBJECTIVE    INR Protime   Date Value Ref Range Status   07/15/2019 2.6 (A) 0.86 - 1.14 Final       ASSESSMENT / PLAN  INR assessment THER    Recheck INR In: 8 WEEKS    INR Location Clinic      Anticoagulation Summary  As of 7/15/2019    INR goal:   2.0-3.0   TTR:   89.3 % (1.8 y)   INR used for dosin.6 (7/15/2019)   Warfarin maintenance plan:   5 mg (5 mg x 1) every Mon, Thu; 7.5 mg (5 mg x 1.5) all other days   Full warfarin instructions:   5 mg every Mon, Thu; 7.5 mg all other days   Weekly warfarin total:   47.5 mg   No change documented:   Maryjane Grey RN   Plan last modified:   Jessica Coker RN (10/20/2017)   Next INR check:   2019   Target end date:       Indications    Atrial fibrillation (H) [I48.91]  Long-term (current) use of anticoagulants [Z79.01] [Z79.01]             Anticoagulation Episode Summary     INR check location:   Anticoagulation Clinic    Preferred lab:       Send INR reminders to:   VALE CASTILLO    Comments:   Patient having INR drawn through [employer] [facility] through May 2018. Reach patient [phone].      Anticoagulation Care Providers     Provider Role Specialty Phone number    [facility], Yobani Paniagua MD Referring Cardiology [phone]    Pieter Ulloa MD Responsible Family  Practice [phone]            See the Encounter Report to view Anticoagulation Flowsheet and Dosing Calendar (Go to Encounters tab in chart review, and find the Anticoagulation Therapy Visit)    Pt INR is 2.6 today. Pt and his wife advised to continue maintenance dose of 5 mg on Mondays, Thursdays and 7.5 mg all the other days. Recheck INR in 8 weeks. Fredrick and his wife aware if signs of clotting (pain, tenderness, swelling, color change in leg or arm, SOB) and bleeding occur (blood in stool, urine, large bruising, bleeding gums, nosebleeds) to have INR check sooner. If sx severe report to ER or concerned for stroke call 911. If general questions or concerns arise, call clinic.         Maryjane Grey RN

## 2019-07-17 ENCOUNTER — PRE VISIT (OUTPATIENT)
Dept: UROLOGY | Facility: CLINIC | Age: 79
End: 2019-07-17

## 2019-07-17 NOTE — TELEPHONE ENCOUNTER
Reason for visit: Overactive bladder consult     Relevant information: pt has alzheimer's, referred by Dr. Ulloa for UDS    Records/imaging/labs/orders: records available    Pt called: no    At Rooming: pvr

## 2019-07-22 ENCOUNTER — OFFICE VISIT (OUTPATIENT)
Dept: UROLOGY | Facility: CLINIC | Age: 79
End: 2019-07-22
Attending: FAMILY MEDICINE
Payer: COMMERCIAL

## 2019-07-22 VITALS
BODY MASS INDEX: 24.21 KG/M2 | HEART RATE: 70 BPM | WEIGHT: 169.1 LBS | HEIGHT: 70 IN | SYSTOLIC BLOOD PRESSURE: 136 MMHG | DIASTOLIC BLOOD PRESSURE: 74 MMHG

## 2019-07-22 DIAGNOSIS — N39.46 MIXED INCONTINENCE: Primary | ICD-10-CM

## 2019-07-22 ASSESSMENT — MIFFLIN-ST. JEOR: SCORE: 1488.28

## 2019-07-22 ASSESSMENT — PAIN SCALES - GENERAL: PAINLEVEL: NO PAIN (0)

## 2019-07-22 NOTE — PATIENT INSTRUCTIONS
Schedule urodynamics with fluoro and follow up with Dr. Candelario after.        It was a pleasure meeting with you today.  Thank you for allowing me and my team the privilege of caring for you today.  YOU are the reason we are here, and I truly hope we provided you with the excellent service you deserve.  Please let us know if there is anything else we can do for you so that we can be sure you are leaving completely satisfied with your care experience.

## 2019-07-22 NOTE — LETTER
2019       RE: Fredrick Mehta  [address]  [facility] [address]     Dear Colleague,    Thank you for referring your patient, Fredrick Mehta, to the [facility] at [facility]. Please see a copy of my visit note below.      Urology Clinic    Josh Candelario MD  Date of Service: 2019     Name: Fredrick Mehta  MRN: [id number]  Age: 78 year old  : 1940  Referring provider: Pieter Ulloa     Assessment and Plan:  Assessment:  Fredrick Mehta  is a 78 year old male with history of prostate cancer s/p perineal prostatectomy in  who has urinary incontinence worse in the last year. Due to Alzheimer's diagnosis he is unable to tell much whether this is stress or urgency associated. Detrol did not help and PVR today is 100cc. Due to his inability to truly define symptoms would like to investigate with urodynamics. Etiology includes detrusor underactivity with overflow incontinence, stress incontinence from aging and prior prostatectomy, or an element of overactivity. Some combination of both is also likely.    Plan:  RTC for urodynamics -- hard to get a sense of his subj symptoms so need some obj data  Treatment of incontinence pending results    Discussed that treatment options may be few -- ACh may cause more confusion and AUS would be too hard to manage. Sling might work.     =======================================================  As the attending surgeon I, Josh Candelario, interviewed and examined the patient. The plan was developed between me and the patient. My findings and plan are as stated by the resident.    Josh Candelario MD    ---------------------------------------------------------------------------------------------------------------------    Chief Complaint:   Overactive bladder     HPI:   Fredrick Mehta  is a 78 year old male with history of prostate cancer s/p perineal  prostatectomy in  who has urinary incontinence noticed in the last year. Due to Alzheimer's diagnosis he is unable to tell much whether this is stress or urgency associated. It happens throughout the day and he wears pads but it is unclear how much volume is actually leaked. Detrol did not help from his PCP. Voids q2-3 hours. Nocturia x1. No dysuria or hematuria. Stream is okay, no straining. No UTIs. Feels like he empties. No constipation.      He has a history of prostate cancer and had open prostatectomy in . Wife notes he did gain continence back by 1 year after surgery. PSA undetectable.    No pelvic or spine surgery. No diabetes.    Uroflow and Post-Void Residual by Bladder Scan:  Voided Volume: 35cc  QMax: 2.1  QAv.1  PVR: 100cc    Review of Systems:   Pertinent items are noted in HPI or as below, remainder of complete ROS is negative.      Active Medications:     Current Outpatient Medications:      Acetaminophen (TYLENOL PO), , Disp: , Rfl:      Cetirizine HCl (ZYRTEC ALLERGY PO), Take  by mouth daily as needed., Disp: , Rfl:      diltiazem ER COATED BEADS (CARDIZEM CD/CARTIA XT) 240 MG 24 hr capsule, Take 1 capsule (240 mg) by mouth 2 times daily, Disp: 180 capsule, Rfl: 4     donepezil (ARICEPT) 10 MG tablet, Take 1 tablet (10 mg) by mouth At Bedtime, Disp: 30 tablet, Rfl: 0     IBUPROFEN PO, , Disp: , Rfl:      levothyroxine (SYNTHROID/LEVOTHROID) 50 MCG tablet, Take 1 tablet (50 mcg) by mouth daily Needs clinic visit for further refills, Disp: 90 tablet, Rfl: 3     memantine (NAMENDA) 5 MG tablet, 10 mg , Disp: , Rfl: 1     pravastatin (PRAVACHOL) 10 MG tablet, Take 1 tablet (10 mg) by mouth daily, Disp: 90 tablet, Rfl: 4     pravastatin (PRAVACHOL) 20 MG tablet, TAKE ONE TABLET BY MOUTH EVERY EVENING. TAKE TOGETHER WITH 10MG, Disp: 90 tablet, Rfl: 4     tolterodine ER (DETROL LA) 4 MG 24 hr capsule, Take 1 capsule (4 mg) by mouth daily, Disp: 30 capsule, Rfl: 1     Travoprost 0.004 % SOLN,  "Place 1 drop into both eyes At Bedtime, Disp: 7.5 mL, Rfl: 3     warfarin (COUMADIN) 5 MG tablet, Take 5 mg by mouth M and Th, and 7.5 mg ROW, or as directed per Coumadin clinic., Disp: 135 tablet, Rfl: 4      Allergies:   Hay fever & [a.r.m.]; Latex; and Seasonal allergies      Past Medical History:  Anatomical narrow angle   Benign positional vertigo   Cataracts  Glaucoma   Hypercholesterolemia   Migraine   Paroxysmal atrial fibrillation   Hypothyroidism   Memory loss   Urinary incontinence      Past Surgical History:  Prostate surgery 1999   Selective laser trabeculoplasty 2007     Family History:   Maternal grandfather: glaucoma       Social History:   Former smoker, 1 ppd, quit in 1986.   No alcohol use.      Physical Exam:   Estimated body mass index is 24.26 kg/m  as calculated from the following:    Height as of this encounter: 1.778 m (5' 10\").    Weight as of this encounter: 76.7 kg (169 lb 1.6 oz).  General: age-appropriate appearing male in NAD.   HEENT: Head AT/NC, EOMI, CN Grossly intact  Resp: no respiratory distress  CV: heart rate regular  Back:  flanks are nontender  Abdomen: no obesity , soft, non-distended, non-tender. Surgical scars include: no abdominal scars  : testicles normal without atrophy or masses and penis is circumcised and normal without urethral discharge  LE: no edema.   Neuro: grossly intact  Motor: excellent strength throughout  Skin: clear of rashes or ecchymoses.     Imaging:   No recent or relevant imaging studies.      Laboratory:   I personally reviewed all applicable laboratory data and went over findings with patient  Significant for:    CBC RESULTS:  No results for input(s): WBC, HGB, PLT in the last 29284 hours.  BMP RESULTS:  Recent Labs   Lab Test 10/17/17  1412 09/13/16  1329 10/29/15  1137 10/29/15  0930 10/20/14  0917 08/15/14  0952  01/11/12  1055   .0 142.0 140  --  142.0 140  --  140   POTASSIUM 4.0 4.5 4.0  --  5.0 4.1  --  4.5   CHLORIDE 104.0 106.0 106  " --  106.0 108  --  101   CO2 31.0 28.0 27  --  31.0 27  --  32   ANIONGAP  --   --  6  --   --  5*  --  7   GLC 87.0  88.0 112.0*  114.0* 87 95.0 80.0  83.0 101*   < > 84   BUN 23.0 25.0 21  --  27.0 31*  --  28   CR 1.1 1.2 1.14  --  1.3 1.16  --  1.05   GFRESTIMATED  --   --  62  --   --  61  --  69   GFRESTBLACK  --   --  76  --   --  74  --  84   SORIN 9.4 9.4 8.9  --  9.5 8.9  --  9.6    < > = values in this interval not displayed.     UA RESULTS:   Recent Labs   Lab Test 04/24/19  1409   SG >=1.030   URINEPH 5.5   NITRITE Negative     PSA RESULTS:   PSA   Date Value Ref Range Status   04/24/2019 <0.01 0 - 4 ug/L Final     Comment:     Assay Method:  Chemiluminescence using Siemens Vista analyzer   10/17/2017 <0.01 0 - 4 ug/L Final     Comment:     Assay Method:  Chemiluminescence using Siemens Vista analyzer   10/29/2015 <0.01 0 - 4 ug/L Final   10/20/2014 0.01 0 - 4 ug/L Final   12/13/2013  0 - 4 ug/L Final    <0.07  PSA results are about 7% lower than our prior method due to a methodology change   on August 30, 2011.   02/09/2012  0 - 4 ug/L Final    <0.07  PSA results are about 7% lower than our prior method due to a methodology change   on August 30, 2011.   01/24/2011 <0.10 0 - 4 ug/L Final   03/05/2010 <0.10 0 - 4 ug/L Final   01/28/2009 <0.10 0 - 4 ug/L Final   07/09/2008 <0.10 0 - 4 ug/L Final       Jessika Oleary MD  Reconstructive Urology Fellow        Again, thank you for allowing me to participate in the care of your patient.      Sincerely,    Josh Candelario MD

## 2019-07-22 NOTE — NURSING NOTE
"Chief Complaint   Patient presents with     Consult     incontinence       Blood pressure 136/74, pulse 70, height 1.778 m (5' 10\"), weight 76.7 kg (169 lb 1.6 oz). Body mass index is 24.26 kg/m .    Patient Active Problem List   Diagnosis     Atrial fibrillation (H)     Hypercholesterolemia     Preventative health care     Hypothyroidism     Glaucoma     Long-term (current) use of anticoagulants [Z79.01]     Chronic atrial fibrillation (H)     Acquired hypothyroidism     Memory loss     Primary open angle glaucoma of both eyes, moderate stage     Medicare annual wellness visit, subsequent     Other urinary incontinence       Allergies   Allergen Reactions     Hay Fever & [A.R.M.]      Latex      Seasonal Allergies        Current Outpatient Medications   Medication Sig Dispense Refill     Acetaminophen (TYLENOL PO)        Cetirizine HCl (ZYRTEC ALLERGY PO) Take  by mouth daily as needed.       diltiazem ER COATED BEADS (CARDIZEM CD/CARTIA XT) 240 MG 24 hr capsule Take 1 capsule (240 mg) by mouth 2 times daily 180 capsule 4     donepezil (ARICEPT) 10 MG tablet Take 1 tablet (10 mg) by mouth At Bedtime 30 tablet 0     IBUPROFEN PO        levothyroxine (SYNTHROID/LEVOTHROID) 50 MCG tablet Take 1 tablet (50 mcg) by mouth daily Needs clinic visit for further refills 90 tablet 3     memantine (NAMENDA) 5 MG tablet 10 mg   1     pravastatin (PRAVACHOL) 10 MG tablet Take 1 tablet (10 mg) by mouth daily 90 tablet 4     pravastatin (PRAVACHOL) 20 MG tablet TAKE ONE TABLET BY MOUTH EVERY EVENING. TAKE TOGETHER WITH 10MG 90 tablet 4     Travoprost 0.004 % SOLN Place 1 drop into both eyes At Bedtime 7.5 mL 3     warfarin (COUMADIN) 5 MG tablet Take 5 mg by mouth M and Th, and 7.5 mg ROW, or as directed per Coumadin clinic. 135 tablet 4     tolterodine ER (DETROL LA) 4 MG 24 hr capsule Take 1 capsule (4 mg) by mouth daily (Patient not taking: Reported on 7/22/2019) 30 capsule 1       Social History     Tobacco Use     Smoking " status: Former Smoker     Packs/day: 1.00     Types: Cigarettes     Start date:      Last attempt to quit: 1986     Years since quittin.5     Smokeless tobacco: Never Used   Substance Use Topics     Alcohol use: No     Drug use: No       VIVI Jimenez  2019  2:23 PM

## 2019-07-22 NOTE — PROGRESS NOTES
Urology Clinic    Josh Candelario MD  Date of Service: 2019     Name: Fredrick Mehta  MRN: [id number]  Age: 78 year old  : 1940  Referring provider: Pieter Ulloa     Assessment and Plan:  Assessment:  Fredrick Mehta  is a 78 year old male with history of prostate cancer s/p perineal prostatectomy in  who has urinary incontinence worse in the last year. Due to Alzheimer's diagnosis he is unable to tell much whether this is stress or urgency associated. Detrol did not help and PVR today is 100cc. Due to his inability to truly define symptoms would like to investigate with urodynamics. Etiology includes detrusor underactivity with overflow incontinence, stress incontinence from aging and prior prostatectomy, or an element of overactivity. Some combination of both is also likely.    Plan:  RTC for urodynamics -- hard to get a sense of his subj symptoms so need some obj data  Treatment of incontinence pending results    Discussed that treatment options may be few -- ACh may cause more confusion and AUS would be too hard to manage. Sling might work.     =======================================================  As the attending surgeon I, Josh Candelario, interviewed and examined the patient. The plan was developed between me and the patient. My findings and plan are as stated by the resident.    Josh Candelario MD    ---------------------------------------------------------------------------------------------------------------------    Chief Complaint:   Overactive bladder     HPI:   Fredrick Mehta  is a 78 year old male with history of prostate cancer s/p perineal prostatectomy in  who has urinary incontinence noticed in the last year. Due to Alzheimer's diagnosis he is unable to tell much whether this is stress or urgency associated. It happens throughout the day and he wears pads but it is unclear how much volume is actually leaked. Detrol did not help from his PCP. Voids q2-3 hours.  Nocturia x1. No dysuria or hematuria. Stream is okay, no straining. No UTIs. Feels like he empties. No constipation.      He has a history of prostate cancer and had open prostatectomy in . Wife notes he did gain continence back by 1 year after surgery. PSA undetectable.    No pelvic or spine surgery. No diabetes.    Uroflow and Post-Void Residual by Bladder Scan:  Voided Volume: 35cc  QMax: 2.1  QAv.1  PVR: 100cc    Review of Systems:   Pertinent items are noted in HPI or as below, remainder of complete ROS is negative.      Active Medications:     Current Outpatient Medications:      Acetaminophen (TYLENOL PO), , Disp: , Rfl:      Cetirizine HCl (ZYRTEC ALLERGY PO), Take  by mouth daily as needed., Disp: , Rfl:      diltiazem ER COATED BEADS (CARDIZEM CD/CARTIA XT) 240 MG 24 hr capsule, Take 1 capsule (240 mg) by mouth 2 times daily, Disp: 180 capsule, Rfl: 4     donepezil (ARICEPT) 10 MG tablet, Take 1 tablet (10 mg) by mouth At Bedtime, Disp: 30 tablet, Rfl: 0     IBUPROFEN PO, , Disp: , Rfl:      levothyroxine (SYNTHROID/LEVOTHROID) 50 MCG tablet, Take 1 tablet (50 mcg) by mouth daily Needs clinic visit for further refills, Disp: 90 tablet, Rfl: 3     memantine (NAMENDA) 5 MG tablet, 10 mg , Disp: , Rfl: 1     pravastatin (PRAVACHOL) 10 MG tablet, Take 1 tablet (10 mg) by mouth daily, Disp: 90 tablet, Rfl: 4     pravastatin (PRAVACHOL) 20 MG tablet, TAKE ONE TABLET BY MOUTH EVERY EVENING. TAKE TOGETHER WITH 10MG, Disp: 90 tablet, Rfl: 4     tolterodine ER (DETROL LA) 4 MG 24 hr capsule, Take 1 capsule (4 mg) by mouth daily, Disp: 30 capsule, Rfl: 1     Travoprost 0.004 % SOLN, Place 1 drop into both eyes At Bedtime, Disp: 7.5 mL, Rfl: 3     warfarin (COUMADIN) 5 MG tablet, Take 5 mg by mouth M and Th, and 7.5 mg ROW, or as directed per Coumadin clinic., Disp: 135 tablet, Rfl: 4      Allergies:   Hay fever & [a.r.m.]; Latex; and Seasonal allergies      Past Medical History:  Anatomical narrow angle  "  Benign positional vertigo   Cataracts  Glaucoma   Hypercholesterolemia   Migraine   Paroxysmal atrial fibrillation   Hypothyroidism   Memory loss   Urinary incontinence      Past Surgical History:  Prostate surgery 1999   Selective laser trabeculoplasty 2007     Family History:   Maternal grandfather: glaucoma       Social History:   Former smoker, 1 ppd, quit in 1986.   No alcohol use.      Physical Exam:   Estimated body mass index is 24.26 kg/m  as calculated from the following:    Height as of this encounter: 1.778 m (5' 10\").    Weight as of this encounter: 76.7 kg (169 lb 1.6 oz).  General: age-appropriate appearing male in NAD.   HEENT: Head AT/NC, EOMI, CN Grossly intact  Resp: no respiratory distress  CV: heart rate regular  Back:  flanks are nontender  Abdomen: no obesity , soft, non-distended, non-tender. Surgical scars include: no abdominal scars  : testicles normal without atrophy or masses and penis is circumcised and normal without urethral discharge  LE: no edema.   Neuro: grossly intact  Motor: excellent strength throughout  Skin: clear of rashes or ecchymoses.     Imaging:   No recent or relevant imaging studies.      Laboratory:   I personally reviewed all applicable laboratory data and went over findings with patient  Significant for:    CBC RESULTS:  No results for input(s): WBC, HGB, PLT in the last 29734 hours.  BMP RESULTS:  Recent Labs   Lab Test 10/17/17  1412 09/13/16  1329 10/29/15  1137 10/29/15  0930 10/20/14  0917 08/15/14  0952  01/11/12  1055   .0 142.0 140  --  142.0 140  --  140   POTASSIUM 4.0 4.5 4.0  --  5.0 4.1  --  4.5   CHLORIDE 104.0 106.0 106  --  106.0 108  --  101   CO2 31.0 28.0 27  --  31.0 27  --  32   ANIONGAP  --   --  6  --   --  5*  --  7   GLC 87.0  88.0 112.0*  114.0* 87 95.0 80.0  83.0 101*   < > 84   BUN 23.0 25.0 21  --  27.0 31*  --  28   CR 1.1 1.2 1.14  --  1.3 1.16  --  1.05   GFRESTIMATED  --   --  62  --   --  61  --  69   SHAILA  --   " --  76  --   --  74  --  84   SORIN 9.4 9.4 8.9  --  9.5 8.9  --  9.6    < > = values in this interval not displayed.     UA RESULTS:   Recent Labs   Lab Test 04/24/19  1409   SG >=1.030   URINEPH 5.5   NITRITE Negative     PSA RESULTS:   PSA   Date Value Ref Range Status   04/24/2019 <0.01 0 - 4 ug/L Final     Comment:     Assay Method:  Chemiluminescence using Siemens Vista analyzer   10/17/2017 <0.01 0 - 4 ug/L Final     Comment:     Assay Method:  Chemiluminescence using Siemens Vista analyzer   10/29/2015 <0.01 0 - 4 ug/L Final   10/20/2014 0.01 0 - 4 ug/L Final   12/13/2013  0 - 4 ug/L Final    <0.07  PSA results are about 7% lower than our prior method due to a methodology change   on August 30, 2011.   02/09/2012  0 - 4 ug/L Final    <0.07  PSA results are about 7% lower than our prior method due to a methodology change   on August 30, 2011.   01/24/2011 <0.10 0 - 4 ug/L Final   03/05/2010 <0.10 0 - 4 ug/L Final   01/28/2009 <0.10 0 - 4 ug/L Final   07/09/2008 <0.10 0 - 4 ug/L Final       Jessika Oleary MD  Reconstructive Urology Fellow

## 2019-08-07 ENCOUNTER — OFFICE VISIT (OUTPATIENT)
Dept: CARDIOLOGY | Facility: CLINIC | Age: 79
End: 2019-08-07
Attending: INTERNAL MEDICINE
Payer: COMMERCIAL

## 2019-08-07 VITALS
HEIGHT: 70 IN | BODY MASS INDEX: 24.12 KG/M2 | SYSTOLIC BLOOD PRESSURE: 92 MMHG | WEIGHT: 168.5 LBS | DIASTOLIC BLOOD PRESSURE: 60 MMHG | HEART RATE: 62 BPM

## 2019-08-07 DIAGNOSIS — I48.0 PAROXYSMAL ATRIAL FIBRILLATION (H): Primary | ICD-10-CM

## 2019-08-07 PROCEDURE — 99213 OFFICE O/P EST LOW 20 MIN: CPT | Performed by: NURSE PRACTITIONER

## 2019-08-07 PROCEDURE — 93000 ELECTROCARDIOGRAM COMPLETE: CPT | Performed by: NURSE PRACTITIONER

## 2019-08-07 ASSESSMENT — MIFFLIN-ST. JEOR: SCORE: 1485.56

## 2019-08-07 NOTE — LETTER
8/7/2019      Pieter Ulloa MD, MD  [address] [place] [id number]      RE: Fredrick Mehta       Dear Colleague,    I had the pleasure of seeing Fredrick Mehta in the [facility].    Service Date: 08/07/2019      HISTORY OF PRESENT ILLNESS:  Mr. Mehta is a delightful 78-year-old gentleman that I am having the pleasure of meeting today.  He was recently seen last year by Dr. Moscoso.  He is a retired  and has a history of symptomatic paroxysmal atrial fibrillation.  He is here today with his wife.      PAST MEDICAL HISTORY:   1.  Symptomatic paroxysmal atrial fibrillation.  Previously followed by the [facility] by Dr. James Sarkar.  He is currently on diltiazem 240 mg b.i.d. and warfarin.   2.  Dyslipidemia.   3.  Alzheimer's dementia on donepezil and Namenda.   4.  Hypothyroidism, on levothyroxine.      At today's visit, Mr. Mehta states he is feeling well.  His wife states that she notices he does tire as the day progresses.  She feels, however, it is a natural progression.  He currently denies shortness of breath, lightheadedness, dizziness.  He was able to walk into the clinic using the stairs without any concerns.      Currently, denies chest discomfort, lightheadedness, dizziness, syncope or near-syncope.  His wife states that he is not as active as he once was.      A 12-lead EKG today shows sinus bradycardia at 54 beats per minute.  When compared to his 12-lead EKG last year, no acute changes are noted.      I do note that his blood pressure is slightly soft at 92/60.  He denies any dizziness with this.  All other review of systems, past medical history and physical exam are noted below.      ASSESSMENT AND PLAN:  Mr. Mehta is a delightful 78-year-old gentleman here today for his annual followup.   1.  Symptomatic paroxysmal atrial fibrillation.  He remains in sinus rhythm.  He is treated with b.i.d. high-dose diltiazem ER.   He has done well.  Although he is bradycardic and slightly hypotensive, I am reluctant to change the medication since he has done well.  He has no apparent side effects from the medication.  I have asked that they contact us if his blood pressure is too low or has symptoms of hypotension.  Also, should his fatigue increase, I would like to know that as well.  He continues to be anticoagulated with warfarin.  He is now on every 8 week INRs and they have been stable.  This is monitored by [facility].      We will see him again next year.  We would also be happy to see him on an as-needed basis.  A 12-lead EKG will be done at that time.  Should there be concerns of symptomatic hypotension and bradycardia I would recommend decreasing the diltiazem at that time.      2.  Hypercholesterolemia, on pravastatin.  Lipids in January completed at Dr. Ulloa's office were as follows:  Total cholesterol 207, HDL 57, , triglycerides of 116.      3.  Alzheimer's.  Currently on Aricept and Namenda.      4.  Hypothyroidism.  Stable on levothyroxine.  Last TSH 2.25.      Thank you as always for including me in his care.  Please feel free to contact us if you have questions or concerns regarding today's assessment and plan.         LUCIANO DANIEL NP             D: 2019   T: 2019   MT: VERNELL      Name:     CAITLYN MCCLURE   MRN:      [id number]        Account:      [id number]   :      1940           Service Date: 2019      Document: [id number]         Outpatient Encounter Medications as of 2019   Medication Sig Dispense Refill     Acetaminophen (TYLENOL PO)        Cetirizine HCl (ZYRTEC ALLERGY PO) Take  by mouth daily as needed.       diltiazem ER COATED BEADS (CARDIZEM CD/CARTIA XT) 240 MG 24 hr capsule Take 1 capsule (240 mg) by mouth 2 times daily 180 capsule 4     donepezil (ARICEPT) 10 MG tablet Take 1 tablet (10 mg) by mouth At Bedtime 30 tablet 0     IBUPROFEN PO         levothyroxine (SYNTHROID/LEVOTHROID) 50 MCG tablet Take 1 tablet (50 mcg) by mouth daily Needs clinic visit for further refills (Patient taking differently: Take 50 mcg by mouth daily ) 90 tablet 3     memantine (NAMENDA) 5 MG tablet 10 mg   1     pravastatin (PRAVACHOL) 10 MG tablet Take 1 tablet (10 mg) by mouth daily 90 tablet 4     pravastatin (PRAVACHOL) 20 MG tablet TAKE ONE TABLET BY MOUTH EVERY EVENING. TAKE TOGETHER WITH 10MG 90 tablet 4     Travoprost 0.004 % SOLN Place 1 drop into both eyes At Bedtime 7.5 mL 3     warfarin (COUMADIN) 5 MG tablet Take 5 mg by mouth M and Th, and 7.5 mg ROW, or as directed per Coumadin clinic. 135 tablet 4     tolterodine ER (DETROL LA) 4 MG 24 hr capsule Take 1 capsule (4 mg) by mouth daily (Patient not taking: Reported on 7/22/2019) 30 capsule 1     No facility-administered encounter medications on file as of 8/7/2019.        Again, thank you for allowing me to participate in the care of your patient.      Sincerely,    Supriya Herrera, NP, APRN [facility]

## 2019-08-07 NOTE — PROGRESS NOTES
Service Date: 08/07/2019      HISTORY OF PRESENT ILLNESS:  Mr. Mehta is a delightful 78-year-old gentleman that I am having the pleasure of meeting today.  He was recently seen last year by Dr. Moscoso.  He is a retired  and has a history of symptomatic paroxysmal atrial fibrillation.  He is here today with his wife.      PAST MEDICAL HISTORY:   1.  Symptomatic paroxysmal atrial fibrillation.  Previously followed by the [facility] by Dr. James Sarkar.  He is currently on diltiazem 240 mg b.i.d. and warfarin.   2.  Dyslipidemia.   3.  Alzheimer's dementia on donepezil and Namenda.   4.  Hypothyroidism, on levothyroxine.      At today's visit, Mr. Mehta states he is feeling well.  His wife states that she notices he does tire as the day progresses.  She feels, however, it is a natural progression.  He currently denies shortness of breath, lightheadedness, dizziness.  He was able to walk into the clinic using the stairs without any concerns.      Currently, denies chest discomfort, lightheadedness, dizziness, syncope or near-syncope.  His wife states that he is not as active as he once was.      A 12-lead EKG today shows sinus bradycardia at 54 beats per minute.  When compared to his 12-lead EKG last year, no acute changes are noted.      I do note that his blood pressure is slightly soft at 92/60.  He denies any dizziness with this.  All other review of systems, past medical history and physical exam are noted below.      ASSESSMENT AND PLAN:  Mr. Mehta is a delightful 78-year-old gentleman here today for his annual followup.   1.  Symptomatic paroxysmal atrial fibrillation.  He remains in sinus rhythm.  He is treated with b.i.d. high-dose diltiazem ER.  He has done well.  Although he is bradycardic and slightly hypotensive, I am reluctant to change the medication since he has done well.  He has no apparent side effects from the medication.  I have asked that they contact us if  his blood pressure is too low or has symptoms of hypotension.  Also, should his fatigue increase, I would like to know that as well.  He continues to be anticoagulated with warfarin.  He is now on every 8 week INRs and they have been stable.  This is monitored by [facility].      We will see him again next year.  We would also be happy to see him on an as-needed basis.  A 12-lead EKG will be done at that time.  Should there be concerns of symptomatic hypotension and bradycardia I would recommend decreasing the diltiazem at that time.      2.  Hypercholesterolemia, on pravastatin.  Lipids in January completed at Dr. Ulloa's office were as follows:  Total cholesterol 207, HDL 57, , triglycerides of 116.      3.  Alzheimer's.  Currently on Aricept and Namenda.      4.  Hypothyroidism.  Stable on levothyroxine.  Last TSH 2.25.      Thank you as always for including me in his care.  Please feel free to contact us if you have questions or concerns regarding today's assessment and plan.         LUCIANO DANIEL NP             D: 2019   T: 2019   MT: VERNELL      Name:     CAITLYN MCCLURE   MRN:      [id number]        Account:      [id number]   :      1940           Service Date: 2019      Document: [id number]

## 2019-08-07 NOTE — PATIENT INSTRUCTIONS
Call my nurse with any questions or concerns:  [phone]  *If you have concerns after hours, please call [phone], option 2 to speak with on call Cardiologist.

## 2019-08-07 NOTE — PROGRESS NOTES
HPI and Plan: [id number]  See dictation    Orders Placed This Encounter   Procedures     Follow-Up with Electrophysiologist     EKG 12-lead complete w/read - Clinics     EKG 12-lead complete w/read - Clinics (to be scheduled)       No orders of the defined types were placed in this encounter.      There are no discontinued medications.      Encounter Diagnosis   Name Primary?     Paroxysmal atrial fibrillation (H)        CURRENT MEDICATIONS:  Current Outpatient Medications   Medication Sig Dispense Refill     Acetaminophen (TYLENOL PO)        Cetirizine HCl (ZYRTEC ALLERGY PO) Take  by mouth daily as needed.       diltiazem ER COATED BEADS (CARDIZEM CD/CARTIA XT) 240 MG 24 hr capsule Take 1 capsule (240 mg) by mouth 2 times daily 180 capsule 4     donepezil (ARICEPT) 10 MG tablet Take 1 tablet (10 mg) by mouth At Bedtime 30 tablet 0     IBUPROFEN PO        levothyroxine (SYNTHROID/LEVOTHROID) 50 MCG tablet Take 1 tablet (50 mcg) by mouth daily Needs clinic visit for further refills (Patient taking differently: Take 50 mcg by mouth daily ) 90 tablet 3     memantine (NAMENDA) 5 MG tablet 10 mg   1     pravastatin (PRAVACHOL) 10 MG tablet Take 1 tablet (10 mg) by mouth daily 90 tablet 4     pravastatin (PRAVACHOL) 20 MG tablet TAKE ONE TABLET BY MOUTH EVERY EVENING. TAKE TOGETHER WITH 10MG 90 tablet 4     Travoprost 0.004 % SOLN Place 1 drop into both eyes At Bedtime 7.5 mL 3     warfarin (COUMADIN) 5 MG tablet Take 5 mg by mouth M and Th, and 7.5 mg ROW, or as directed per Coumadin clinic. 135 tablet 4     tolterodine ER (DETROL LA) 4 MG 24 hr capsule Take 1 capsule (4 mg) by mouth daily (Patient not taking: Reported on 7/22/2019) 30 capsule 1       ALLERGIES     Allergies   Allergen Reactions     Hay Fever & [A.R.M.]      Latex      Seasonal Allergies        PAST MEDICAL HISTORY:  Past Medical History:   Diagnosis Date     Anatomical narrow angle      Benign positional vertigo      Cataracts      Glaucoma     OAG      Hypercholesterolemia      Migraine      Paroxysmal atrial fibrillation (H)        PAST SURGICAL HISTORY:  Past Surgical History:   Procedure Laterality Date     PROSTATE SURGERY       SELECTIVE LASER TRABECULOPLASTY (SLT) OD (RIGHT EYE)  2007    RE       FAMILY HISTORY:  Family History   Problem Relation Age of Onset     Glaucoma Maternal Grandfather      Macular Degeneration No family hx of        SOCIAL HISTORY:  Social History     Socioeconomic History     Marital status:      Spouse name: None     Number of children: None     Years of education: None     Highest education level: None   Occupational History     None   Social Needs     Financial resource strain: None     Food insecurity:     Worry: None     Inability: None     Transportation needs:     Medical: None     Non-medical: None   Tobacco Use     Smoking status: Former Smoker     Packs/day: 1.00     Types: Cigarettes     Start date:      Last attempt to quit:      Years since quittin.6     Smokeless tobacco: Never Used   Substance and Sexual Activity     Alcohol use: No     Drug use: No     Sexual activity: None   Lifestyle     Physical activity:     Days per week: None     Minutes per session: None     Stress: None   Relationships     Social connections:     Talks on phone: None     Gets together: None     Attends Jain service: None     Active member of club or organization: None     Attends meetings of clubs or organizations: None     Relationship status: None     Intimate partner violence:     Fear of current or ex partner: None     Emotionally abused: None     Physically abused: None     Forced sexual activity: None   Other Topics Concern     Parent/sibling w/ CABG, MI or angioplasty before 65F 55M? Not Asked   Social History Narrative     None       Review of Systems:  Skin:  Negative       Eyes:  Positive for glasses    ENT:  Negative      Respiratory:  Negative       Cardiovascular:  Negative      Gastroenterology:  "Negative      Genitourinary:  Negative      Musculoskeletal:  Negative      Neurologic:  Negative      Psychiatric:  Negative      Heme/Lymph/Imm:  Negative      Endocrine:  Negative        Physical Exam:  Vitals: BP 92/60   Pulse 62   Ht 1.778 m (5' 10\")   Wt 76.4 kg (168 lb 8 oz)   BMI 24.18 kg/m      Constitutional:  cooperative, alert and oriented, well developed, well nourished, in no acute distress        Skin:  warm and dry to the touch          Head:  normocephalic, no masses or lesions        Eyes:  pupils equal and round        Lymph:      ENT:           Neck:  carotid pulses are full and equal bilaterally, JVP normal, no carotid bruit        Respiratory:  clear to auscultation         Cardiac: regular rhythm;normal S1 and S2;no murmurs, gallops or rubs detected bradycardic              not assessed this visit                                        GI:  abdomen soft        Extremities and Muscular Skeletal:  no edema;no deformities, clubbing, cyanosis, erythema observed              Neurological:  no gross motor deficits        Psych:  affect appropriate, oriented to time, person and place        RICHIE Moscoso MD  [address]  [place] [address]              "

## 2019-09-09 ENCOUNTER — TELEPHONE (OUTPATIENT)
Dept: FAMILY MEDICINE | Facility: CLINIC | Age: 79
End: 2019-09-09

## 2019-09-09 ENCOUNTER — ANTICOAGULATION THERAPY VISIT (OUTPATIENT)
Dept: NURSING | Facility: CLINIC | Age: 79
End: 2019-09-09
Payer: COMMERCIAL

## 2019-09-09 LAB — INR POINT OF CARE: 4.3 (ref 0.86–1.14)

## 2019-09-09 PROCEDURE — 36416 COLLJ CAPILLARY BLOOD SPEC: CPT

## 2019-09-09 PROCEDURE — 99207 ZZC NO CHARGE NURSE ONLY: CPT

## 2019-09-09 PROCEDURE — 85610 PROTHROMBIN TIME: CPT | Mod: QW

## 2019-09-09 NOTE — TELEPHONE ENCOUNTER
,    Pt is due for INR renewal/referral. Please review the pended order and sign if appropriate.    Thank you!

## 2019-09-09 NOTE — PROGRESS NOTES
ANTICOAGULATION FOLLOW-UP CLINIC VISIT    Patient Name:  Fredrick Mehta  Date:  2019  Contact Type:  Face to Face    SUBJECTIVE:  Patient Findings     Positives:   Change in health    Comments:   Pt fell and injured his side on 19. Pt and his wife report that he is getting better. Pt advised to see  if he continues to have discomfort and pain in the area. Pt denies hitting his head.        Clinical Outcomes     Comments:   Pt fell and injured his side on 19. Pt and his wife report that he is getting better. Pt advised to see  if he continues to have discomfort and pain in the area. Pt denies hitting his head.           OBJECTIVE    INR Protime   Date Value Ref Range Status   2019 4.3 (A) 0.86 - 1.14 Final       ASSESSMENT / PLAN  INR assessment SUPRA    Recheck INR In: 2 WEEKS    INR Location Clinic      Anticoagulation Summary  As of 2019    INR goal:   2.0-3.0   TTR:   84.2 % (2 y)   INR used for dosin.3! (2019)   Warfarin maintenance plan:   5 mg (5 mg x 1) every Mon, Thu; 7.5 mg (5 mg x 1.5) all other days   Full warfarin instructions:   : Hold; 9/10: 5 mg; Otherwise 5 mg every Mon, Thu; 7.5 mg all other days   Weekly warfarin total:   47.5 mg   Plan last modified:   Jessica Coker RN (10/20/2017)   Next INR check:   2019   Target end date:       Indications    Atrial fibrillation (H) [I48.91]  Long-term (current) use of anticoagulants [Z79.01] [Z79.01]             Anticoagulation Episode Summary     INR check location:   Anticoagulation Clinic    Preferred lab:       Send INR reminders to:   VALE [place]    Comments:   Patient having INR drawn through [facility] through May 2018. Reach patient [phone].      Anticoagulation Care Providers     Provider Role Specialty Phone number    Yobani Sarkar MD Referring Cardiology [phone]    Pieter Ulloa MD Responsible Family Practice [phone]             See the Encounter Report to view Anticoagulation Flowsheet and Dosing Calendar (Go to Encounters tab in chart review, and find the Anticoagulation Therapy Visit)    Pt INR is 4.3 today. Pt had a fall on 9/5/19 and injured his left side. Pt has been taking Ibuprofen and Tylenol. Pt's wife knows that Tylenol is preferred to Ibuprofen due to bleeding risk. Pt and his wife advised to HOLD warfarin today 9/9/19 then take 5 mg on Tuesday 9/10/19 then 5 mg on Mondays, Thursdays and 7.5 mg all the other days. Pt's wife did not want to come back sooner than 2 weeks for INR recheck because they don't drive and have to take a cab. Pt advised to seek medical attention if his side pain does not improve.[place] aware if signs of clotting (pain, tenderness, swelling, color change in leg or arm, SOB) and bleeding occur (blood in stool, urine, large bruising, bleeding gums, nosebleeds) to have INR check sooner. If sx severe report to ER or concerned for stroke call 911. If general questions or concerns arise, call clinic.         Maryjane Grey RN

## 2019-09-18 NOTE — TELEPHONE ENCOUNTER
Thank you Cardiology!  Click on the box in the bottom right of your screen to see the pended order. It says 1 unsigned order or may create a new INR renewal order for pt.    Thanks again!

## 2019-09-20 NOTE — TELEPHONE ENCOUNTER
I can't find the order, Maryjane. When I place a new order it only populates to our clinic in [place]. Can you send me an order to sign?

## 2019-09-23 ENCOUNTER — TELEPHONE (OUTPATIENT)
Dept: FAMILY MEDICINE | Facility: CLINIC | Age: 79
End: 2019-09-23

## 2019-09-23 DIAGNOSIS — I48.20 CHRONIC ATRIAL FIBRILLATION (H): Primary | ICD-10-CM

## 2019-09-23 NOTE — TELEPHONE ENCOUNTER
Left message for pt/wife to contact the clinic to reschedule an INR appointment this week since it was missed today.

## 2019-09-23 NOTE — TELEPHONE ENCOUNTER
Spoke with Thania PLASCENCIA at 's office. They do not see the pended order for INR clinic.This pended order canceled/removed. Requested that  send a signed order for INR clinic referral which they will do.    Can call Thania at the clinic at [phone] for problems as needed or the order is not completed

## 2019-09-23 NOTE — TELEPHONE ENCOUNTER
Spoke to Maryjane, nurse at [facility] - she is trying to get standing orders placed for INR management at their clinic. Has tried to send orders several times, but they are not yet signed by Dr Ulloa - unable to view these orders in the chart. Initiating new order for INR care to be continued at [facility]. This will be signed by Dr Ulloa.   Meche Tse RN  [facility]

## 2019-09-23 NOTE — TELEPHONE ENCOUNTER
Call placed to 's clinic at [phone] to request to have him sign the INR clinic renewal/referral order.

## 2019-09-23 NOTE — TELEPHONE ENCOUNTER
[facility] Call Center    Phone Message    May a detailed message be left on voicemail: no    Reason for Call: Order(s): Other:   Reason for requested: INR - need approval to continue care (INR draws) at [facility]. Need INR clinic referral order signed in [facility].  Date needed: asap  Provider name:       Action Taken: Message routed to:  [facility]

## 2019-09-27 ENCOUNTER — ANTICOAGULATION THERAPY VISIT (OUTPATIENT)
Dept: NURSING | Facility: CLINIC | Age: 79
End: 2019-09-27
Payer: COMMERCIAL

## 2019-09-27 LAB — INR POINT OF CARE: 3 (ref 0.86–1.14)

## 2019-09-27 PROCEDURE — 85610 PROTHROMBIN TIME: CPT | Mod: QW

## 2019-09-27 PROCEDURE — 36416 COLLJ CAPILLARY BLOOD SPEC: CPT

## 2019-09-27 PROCEDURE — 99207 ZZC NO CHARGE NURSE ONLY: CPT

## 2019-09-27 NOTE — PROGRESS NOTES
ANTICOAGULATION FOLLOW-UP CLINIC VISIT    Patient Name:  Fredrick Mehta  Date:  9/27/2019  Contact Type:  Face to Face    SUBJECTIVE:  Patient Findings     Comments:   The patient was assessed for diet, medication, and activity level changes, missed or extra doses, bruising or bleeding, with no problem findings.  Pt had fallen several weeks ago, all healed, denies pain lt side           Clinical Outcomes     Comments:   The patient was assessed for diet, medication, and activity level changes, missed or extra doses, bruising or bleeding, with no problem findings.  Pt had fallen several weeks ago, all healed, denies pain lt side              OBJECTIVE    INR Protime   Date Value Ref Range Status   09/27/2019 3.0 (A) 0.86 - 1.14 Final       ASSESSMENT / PLAN  INR assessment THER    Recheck INR In: 6 WEEKS    INR Location Clinic      Anticoagulation Summary  As of 9/27/2019    INR goal:   2.0-3.0   TTR:   82.2 % (2.1 y)   INR used for dosing:   3.0 (9/27/2019)   Warfarin maintenance plan:   5 mg (5 mg x 1) every Mon, Thu; 7.5 mg (5 mg x 1.5) all other days   Full warfarin instructions:   5 mg every Mon, Thu; 7.5 mg all other days   Weekly warfarin total:   47.5 mg   No change documented:   Rosaura Thomson RN   Plan last modified:   Jessica Coker RN (10/20/2017)   Next INR check:   11/11/2019   Target end date:       Indications    Atrial fibrillation (H) [I48.91]  Long-term (current) use of anticoagulants [Z79.01] [Z79.01]             Anticoagulation Episode Summary     INR check location:   Anticoagulation Clinic    Preferred lab:       Send INR reminders to:   VALE [place]    Comments:   Patient having INR drawn through [facility] through May 2018. Reach patient [phone].      Anticoagulation Care Providers     Provider Role Specialty Phone number    formerly [facility], Yobani Paniagua MD Referring Cardiology [phone]    Pieter Ulloa MD [facility] Family Practice [phone]             See the Encounter Report to view Anticoagulation Flowsheet and Dosing Calendar (Go to Encounters tab in chart review, and find the Anticoagulation Therapy Visit)    Dosage adjustment made based on physician directed care plan.    Rosaura Thomson RN

## 2019-10-24 DIAGNOSIS — H40.1132 PRIMARY OPEN ANGLE GLAUCOMA OF BOTH EYES, MODERATE STAGE: ICD-10-CM

## 2019-11-11 ENCOUNTER — ANTICOAGULATION THERAPY VISIT (OUTPATIENT)
Dept: NURSING | Facility: CLINIC | Age: 79
End: 2019-11-11
Payer: COMMERCIAL

## 2019-11-11 LAB — INR POINT OF CARE: 3.1 (ref 0.86–1.14)

## 2019-11-11 PROCEDURE — 85610 PROTHROMBIN TIME: CPT | Mod: QW

## 2019-11-11 PROCEDURE — 99207 ZZC NO CHARGE NURSE ONLY: CPT

## 2019-11-11 PROCEDURE — 36416 COLLJ CAPILLARY BLOOD SPEC: CPT

## 2019-11-11 NOTE — PROGRESS NOTES
ANTICOAGULATION FOLLOW-UP CLINIC VISIT    Patient Name:  Fredrick Mehta  Date:  11/11/2019  Contact Type:  Face to Face    SUBJECTIVE:  Patient Findings     Positives:   Change in diet/appetite    Comments:   Pt is here with his wife. Pt's wife states that he has not been eating as many greens lately. Discussed green intake at length. Pt and his wife advised to eat greens about 2-3 times per week. Pt given the vitamin K brochure.        Clinical Outcomes     Comments:   Pt is here with his wife. Pt's wife states that he has not been eating as many greens lately. Discussed green intake at length. Pt and his wife advised to eat greens about 2-3 times per week. Pt given the vitamin K brochure.           OBJECTIVE    INR Protime   Date Value Ref Range Status   11/11/2019 3.1 (A) 0.86 - 1.14 Final       ASSESSMENT / PLAN  INR assessment SUPRA    Recheck INR In: 4 WEEKS    INR Location Clinic      Anticoagulation Summary  As of 11/11/2019    INR goal:   2.0-3.0   TTR:   77.6 % (2.2 y)   INR used for dosing:   3.1! (11/11/2019)   Warfarin maintenance plan:   5 mg (5 mg x 1) every Mon, Thu; 7.5 mg (5 mg x 1.5) all other days   Full warfarin instructions:   11/12: 5 mg; Otherwise 5 mg every Mon, Thu; 7.5 mg all other days   Weekly warfarin total:   47.5 mg   Plan last modified:   Jessica Coker RN (10/20/2017)   Next INR check:   12/9/2019   Target end date:       Indications    Atrial fibrillation (H) [I48.91]  Long-term (current) use of anticoagulants [Z79.01] [Z79.01]             Anticoagulation Episode Summary     INR check location:   Anticoagulation Clinic    Preferred lab:       Send INR reminders to:   VALE [place]    Comments:   Patient having INR drawn through [facility] through May 2018. Reach patient [phone].      Anticoagulation Care Providers     Provider Role Specialty Phone number    Yobani Sarkar MD Referring Cardiology [phone]    Pieter Ulloa MD Responsible  Family Practice [phone]            See the Encounter Report to view Anticoagulation Flowsheet and Dosing Calendar (Go to Encounters tab in chart review, and find the Anticoagulation Therapy Visit)    Pt INR is 3.1 today. Pt's wife reports that it is probably due to a decrease in his salad/green intake. Pt advised to take 5 mg tomorrow 11/12/19 instead of 7.5 mg then 5 mg on Mondays and Thursdays and 7.5 mg all the other days. Pt and his wife have to call a cab to get to the clinic for INR checks. Pt will recheck INR in 4 weeks or sooner as needed. Pt's wife will also give pt some spinach tonight. Fredrick and his wife aware if signs of clotting (pain, tenderness, swelling, color change in leg or arm, SOB) and bleeding occur (blood in stool, urine, large bruising, bleeding gums, nosebleeds) to have INR check sooner. If sx severe report to ER or concerned for stroke call 911. If general questions or concerns arise, call clinic.         Maryjane Grey RN

## 2019-12-10 ENCOUNTER — ANTICOAGULATION THERAPY VISIT (OUTPATIENT)
Dept: NURSING | Facility: CLINIC | Age: 79
End: 2019-12-10
Payer: COMMERCIAL

## 2019-12-10 LAB — INR POINT OF CARE: 2.2 (ref 0.86–1.14)

## 2019-12-10 PROCEDURE — 36416 COLLJ CAPILLARY BLOOD SPEC: CPT

## 2019-12-10 PROCEDURE — 99207 ZZC NO CHARGE NURSE ONLY: CPT

## 2019-12-10 PROCEDURE — 85610 PROTHROMBIN TIME: CPT | Mod: QW

## 2019-12-10 NOTE — PROGRESS NOTES
ANTICOAGULATION FOLLOW-UP CLINIC VISIT    Patient Name:  Fredrick Mehta  Date:  12/10/2019  Contact Type:  Face to Face    SUBJECTIVE:  Patient Findings     Comments:   The patient was assessed for diet, medication, and activity level changes, missed or extra doses, bruising or bleeding, with no problem findings.        Clinical Outcomes     Negatives:   Major bleeding event, Thromboembolic event, Anticoagulation-related hospital admission, Anticoagulation-related ED visit, Anticoagulation-related fatality    Comments:   The patient was assessed for diet, medication, and activity level changes, missed or extra doses, bruising or bleeding, with no problem findings.           OBJECTIVE    INR Protime   Date Value Ref Range Status   12/10/2019 2.2 (A) 0.86 - 1.14 Final       ASSESSMENT / PLAN  INR assessment THER    Recheck INR In: 6 WEEKS    INR Location Clinic      Anticoagulation Summary  As of 12/10/2019    INR goal:   2.0-3.0   TTR:   70.1 % (1 y)   INR used for dosin.2 (12/10/2019)   Warfarin maintenance plan:   5 mg (5 mg x 1) every Mon, Thu; 7.5 mg (5 mg x 1.5) all other days   Full warfarin instructions:   5 mg every Mon, Thu; 7.5 mg all other days   Weekly warfarin total:   47.5 mg   No change documented:   Maryjane Grey RN   Plan last modified:   Jessica Coker RN (10/20/2017)   Next INR check:   2020   Target end date:       Indications    Atrial fibrillation (H) [I48.91]  Long-term (current) use of anticoagulants [Z79.01] [Z79.01]             Anticoagulation Episode Summary     INR check location:   Anticoagulation Clinic    Preferred lab:       Send INR reminders to:   VALE CASTILLO    Comments:   Patient having INR drawn through [facility] through May 2018. Reach patient [phone].      Anticoagulation Care Providers     Provider Role Specialty Phone number    [facility], Yobani Paniagua MD Referring Cardiology [phone]    Pieter Ulloa MD Responsible Family  Practice [phone]            See the Encounter Report to view Anticoagulation Flowsheet and Dosing Calendar (Go to Encounters tab in chart review, and find the Anticoagulation Therapy Visit)    Pt INR is 2.2 today. Pt is here with his wife. Pt advised to continue taking 5 mg on Mondays, Thursdays and 7.5 mg all the other days. Recheck INR in 6 weeks or sooner as needed. Fredrick aware if signs of clotting (pain, tenderness, swelling, color change in leg or arm, SOB) and bleeding occur (blood in stool, urine, large bruising, bleeding gums, nosebleeds) to have INR check sooner. If sx severe report to ER or concerned for stroke call 911. If general questions or concerns arise, call clinic.         Maryjane Grey RN

## 2020-01-17 ENCOUNTER — TRANSFERRED RECORDS (OUTPATIENT)
Dept: HEALTH INFORMATION MANAGEMENT | Facility: CLINIC | Age: 80
End: 2020-01-17

## 2020-01-21 ENCOUNTER — ANTICOAGULATION THERAPY VISIT (OUTPATIENT)
Dept: NURSING | Facility: CLINIC | Age: 80
End: 2020-01-21
Payer: COMMERCIAL

## 2020-01-21 LAB — INR POINT OF CARE: 2.1 (ref 0.86–1.14)

## 2020-01-21 PROCEDURE — 85610 PROTHROMBIN TIME: CPT | Mod: QW

## 2020-01-21 PROCEDURE — 99207 ZZC NO CHARGE NURSE ONLY: CPT

## 2020-01-21 PROCEDURE — 36416 COLLJ CAPILLARY BLOOD SPEC: CPT

## 2020-01-21 NOTE — PROGRESS NOTES
ANTICOAGULATION FOLLOW-UP CLINIC VISIT    Patient Name:  Fredrick Mehta  Date:  2020  Contact Type:  Face to Face    SUBJECTIVE:  Patient Findings     Comments:   The patient was assessed for diet, medication, and activity level changes, missed or extra doses, bruising or bleeding, with no problem findings.        Clinical Outcomes     Negatives:   Major bleeding event, Thromboembolic event, Anticoagulation-related hospital admission, Anticoagulation-related ED visit, Anticoagulation-related fatality    Comments:   The patient was assessed for diet, medication, and activity level changes, missed or extra doses, bruising or bleeding, with no problem findings.           OBJECTIVE    INR Protime   Date Value Ref Range Status   2020 2.1 (A) 0.86 - 1.14 Final       ASSESSMENT / PLAN  INR assessment THER    Recheck INR In: 6 WEEKS    INR Location Clinic      Anticoagulation Summary  As of 2020    INR goal:   2.0-3.0   TTR:   70.1 % (1 y)   INR used for dosin.1 (2020)   Warfarin maintenance plan:   5 mg (5 mg x 1) every Mon, Thu; 7.5 mg (5 mg x 1.5) all other days   Full warfarin instructions:   5 mg every Mon, Thu; 7.5 mg all other days   Weekly warfarin total:   47.5 mg   No change documented:   Maryjane Grey RN   Plan last modified:   Jessica Coker RN (10/20/2017)   Next INR check:   3/3/2020   Target end date:       Indications    Atrial fibrillation (H) [I48.91]  Long-term (current) use of anticoagulants [Z79.01] [Z79.01]             Anticoagulation Episode Summary     INR check location:   Anticoagulation Clinic    Preferred lab:       Send INR reminders to:   VALE CASTILLO    Comments:   Patient having INR drawn through [facility] through May 2018. Reach patient [phone].      Anticoagulation Care Providers     Provider Role Specialty Phone number    [facility], Yobani Paniagua MD Referring Cardiology [phone]    Pieter Ulloa MD Responsible Family Practice  [phone]            See the Encounter Report to view Anticoagulation Flowsheet and Dosing Calendar (Go to Encounters tab in chart review, and find the Anticoagulation Therapy Visit)    Pt INR is 2.1 today. Pt is here with his wife. Pt and his wife advised to continue taking 5 mg on Mondays, Thursdays and 7.5 mg all the other days. Recheck INR in 6 weeks or sooner as needed. Fredrick and his wife Rosalva aware if signs of clotting (pain, tenderness, swelling, color change in leg or arm, SOB) and bleeding occur (blood in stool, urine, large bruising, bleeding gums, nosebleeds) to have INR check sooner. If sx severe report to ER or concerned for stroke call 911. If general questions or concerns arise, call clinic.         Maryjane Grey RN

## 2020-02-20 DIAGNOSIS — H40.1132 PRIMARY OPEN ANGLE GLAUCOMA OF BOTH EYES, MODERATE STAGE: ICD-10-CM

## 2020-02-20 NOTE — TELEPHONE ENCOUNTER
Medication: Travoprost 0.004 % OP SOLN    Requested directions: Place 1 drop into both eyes At Bedtime   Current directions on the medication list: Same    Last Written Prescription Date:  10/24/19  Last Fill Quantity: 7.5 ml,   # refills: 0    Last Office Visit: 12/12/18 with recommended 6 month follow up, he cancelled 6/12/19 appointment due to lack of transportation  Future Office visit: None scheduled    Attending Provider: Wisam  Last Clinic Note: Return in 6 months for OCT retinal nerve fiber layer and dilation. Goes to [place] for the winter  Continue Travatan at bedtime, both eyes    Routing refill request to provider for review/approval because:  Requires provider review  Per protocol, refill should be denied, could a 3 month supply be provided until returns to [facility] and appointment scheduled?

## 2020-03-02 ENCOUNTER — ANTICOAGULATION THERAPY VISIT (OUTPATIENT)
Dept: NURSING | Facility: CLINIC | Age: 80
End: 2020-03-02
Payer: COMMERCIAL

## 2020-03-02 DIAGNOSIS — I48.91 ATRIAL FIBRILLATION (H): Primary | ICD-10-CM

## 2020-03-02 LAB — INR POINT OF CARE: 2.2 (ref 0.86–1.14)

## 2020-03-02 PROCEDURE — 85610 PROTHROMBIN TIME: CPT | Mod: QW

## 2020-03-02 PROCEDURE — 99207 ZZC NO CHARGE NURSE ONLY: CPT

## 2020-03-02 PROCEDURE — 36416 COLLJ CAPILLARY BLOOD SPEC: CPT

## 2020-03-02 NOTE — PROGRESS NOTES
ANTICOAGULATION FOLLOW-UP CLINIC VISIT    Patient Name:  Fredrick Mehta  Date:  3/2/2020  Contact Type:  Face to Face    SUBJECTIVE:  Patient Findings     Comments:   The patient was assessed for diet, medication, and activity level changes, missed or extra doses, bruising or bleeding, with no problem findings.        Clinical Outcomes     Negatives:   Major bleeding event, Thromboembolic event, Anticoagulation-related hospital admission, Anticoagulation-related ED visit, Anticoagulation-related fatality    Comments:   The patient was assessed for diet, medication, and activity level changes, missed or extra doses, bruising or bleeding, with no problem findings.           OBJECTIVE    INR Protime   Date Value Ref Range Status   2020 2.2 (A) 0.86 - 1.14 Final       ASSESSMENT / PLAN  INR assessment THER    Recheck INR In: 6 WEEKS    INR Location Clinic      Anticoagulation Summary  As of 3/2/2020    INR goal:   2.0-3.0   TTR:   70.1 % (1 y)   INR used for dosin.2 (3/2/2020)   Warfarin maintenance plan:   5 mg (5 mg x 1) every Mon, Thu; 7.5 mg (5 mg x 1.5) all other days   Full warfarin instructions:   5 mg every Mon, Thu; 7.5 mg all other days   Weekly warfarin total:   47.5 mg   No change documented:   Maryjane Grey RN   Plan last modified:   Jessica Coker RN (10/20/2017)   Next INR check:   2020   Target end date:       Indications    Atrial fibrillation (H) [I48.91]  Long-term (current) use of anticoagulants [Z79.01] [Z79.01]             Anticoagulation Episode Summary     INR check location:   Anticoagulation Clinic    Preferred lab:       Send INR reminders to:   VALE CASTILLO    Comments:   Patient having INR drawn through [facility] through May 2018. Reach patient [phone].      Anticoagulation Care Providers     Provider Role Specialty Phone number    [facility], Yobani Paniagua MD Referring Cardiology [phone]    Pieter Ulloa MD Responsible Family Practice  [phone]            See the Encounter Report to view Anticoagulation Flowsheet and Dosing Calendar (Go to Encounters tab in chart review, and find the Anticoagulation Therapy Visit)    Pt INR is 2.2 today. Pt is here with his wife Rosalva. Pt and his wife advised to have pt continue taking 5 mg on Mondays, Thursdays and 7.5 mg all the other days. Recheck INR in 6 weeks or sooner as needed. Fredrick aware if signs of clotting (pain, tenderness, swelling, color change in leg or arm, SOB) and bleeding occur (blood in stool, urine, large bruising, bleeding gums, nosebleeds) to have INR check sooner. If sx severe report to ER or concerned for stroke call 911. If general questions or concerns arise, call clinic.         Maryjane Grey RN

## 2020-03-30 DIAGNOSIS — I48.20 CHRONIC ATRIAL FIBRILLATION (H): ICD-10-CM

## 2020-03-30 RX ORDER — DILTIAZEM HYDROCHLORIDE 240 MG/1
240 CAPSULE, COATED, EXTENDED RELEASE ORAL 2 TIMES DAILY
Qty: 180 CAPSULE | Refills: 1 | Status: SHIPPED | OUTPATIENT
Start: 2020-03-30 | End: 2020-06-19

## 2020-04-16 ENCOUNTER — TELEPHONE (OUTPATIENT)
Dept: FAMILY MEDICINE | Facility: CLINIC | Age: 80
End: 2020-04-16

## 2020-04-16 NOTE — TELEPHONE ENCOUNTER
[facility] Call Center    Phone Message    May a detailed message be left on voicemail: yes     Reason for Call: Other: pts wife is wondering if  things he should have an in-person appt, and if he can get his INR checked the same appt, please call luz elena back thanks     Action Taken: Message routed to:  [facility]: [facility] nurse    Travel Screening: Not Applicable

## 2020-04-17 NOTE — TELEPHONE ENCOUNTER
Spoke to wife Rosalva, rescheduled annual exam to 8/12/20. Reassess INR draw when next is due - last anticoag clinic visit, patient was due for INR 4/13/20. Currently scheduled for 4/27/20.     Component      Latest Ref Rng & Units 3/2/2020   INR      0.86 - 1.14 2.2 (A)     Ana Rosa Hubbard RN  04/17/20  9:15 AM

## 2020-04-27 ENCOUNTER — ANTICOAGULATION THERAPY VISIT (OUTPATIENT)
Dept: ANTICOAGULATION | Facility: CLINIC | Age: 80
End: 2020-04-27

## 2020-04-27 DIAGNOSIS — Z79.01 LONG TERM CURRENT USE OF ANTICOAGULANT THERAPY: ICD-10-CM

## 2020-04-27 DIAGNOSIS — I48.0 PAROXYSMAL ATRIAL FIBRILLATION (H): ICD-10-CM

## 2020-04-27 DIAGNOSIS — I48.91 ATRIAL FIBRILLATION (H): ICD-10-CM

## 2020-04-27 LAB — INR PPP: 2.9 (ref 0.86–1.14)

## 2020-04-27 PROCEDURE — 36416 COLLJ CAPILLARY BLOOD SPEC: CPT | Performed by: INTERNAL MEDICINE

## 2020-04-27 PROCEDURE — 99207 ZZC NO CHARGE NURSE ONLY: CPT

## 2020-04-27 PROCEDURE — 85610 PROTHROMBIN TIME: CPT | Performed by: INTERNAL MEDICINE

## 2020-04-27 NOTE — PROGRESS NOTES
ANTICOAGULATION FOLLOW-UP CLINIC VISIT    Patient Name:  Fredrick Mehta  Date:  2020  Contact Type:  Telephone    SUBJECTIVE:  Patient Findings     Comments:   The patient was assessed for diet, medication, and activity level changes, missed or extra doses, bruising or bleeding, with no problem findings.          Clinical Outcomes     Comments:   The patient was assessed for diet, medication, and activity level changes, missed or extra doses, bruising or bleeding, with no problem findings.             OBJECTIVE    INR   Date Value Ref Range Status   2020 2.90 (H) 0.86 - 1.14 Final       ASSESSMENT / PLAN  INR assessment THER    Recheck INR In: 6 WEEKS    INR Location Clinic      Anticoagulation Summary  As of 2020    INR goal:   2.0-3.0   TTR:   70.2 % (1 y)   INR used for dosin.90 (2020)   Warfarin maintenance plan:   5 mg (5 mg x 1) every Mon, Thu; 7.5 mg (5 mg x 1.5) all other days   Full warfarin instructions:   5 mg every Mon, Thu; 7.5 mg all other days   Weekly warfarin total:   47.5 mg   No change documented:   Heydi Guajardo RN   Plan last modified:   Jessica Coker RN (10/20/2017)   Next INR check:   2020   Target end date:       Indications    Atrial fibrillation (H) [I48.91]  Long-term (current) use of anticoagulants [Z79.01] [Z79.01]             Anticoagulation Episode Summary     INR check location:   Anticoagulation Clinic    Preferred lab:       Send INR reminders to:   VALE CASTILLO    Comments:   Patient having INR drawn through [facility] through May 2018. Reach patient [phone].      Anticoagulation Care Providers     Provider Role Specialty Phone number    Yobani Sarkar MD Referring Cardiology [phone]    Pieter Ulloa MD Responsible Family Practice [phone]            See the Encounter Report to view Anticoagulation Flowsheet and Dosing Calendar (Go to Encounters tab in chart review, and find the Anticoagulation  Therapy Visit)        Heydi Guajardo RN

## 2020-05-04 DIAGNOSIS — E03.9 HYPOTHYROIDISM, UNSPECIFIED TYPE: ICD-10-CM

## 2020-05-04 DIAGNOSIS — Z79.01 LONG TERM CURRENT USE OF ANTICOAGULANT THERAPY: ICD-10-CM

## 2020-05-04 RX ORDER — LEVOTHYROXINE SODIUM 50 UG/1
50 TABLET ORAL DAILY
Qty: 90 TABLET | Refills: 0 | Status: SHIPPED | OUTPATIENT
Start: 2020-05-04 | End: 2020-08-12

## 2020-05-04 RX ORDER — WARFARIN SODIUM 5 MG/1
TABLET ORAL
Qty: 135 TABLET | Refills: 1 | Status: SHIPPED | OUTPATIENT
Start: 2020-05-04 | End: 2020-07-08

## 2020-05-04 NOTE — TELEPHONE ENCOUNTER
Warfarin Last time prescribed: 4/24/19 , 135 tabs/caps x 4 refills  Levothyroxine Last time prescribed: 5/31/19, 90 tabs/caps x 3 refills  Last office visit: 4/24/19  Next appointment: No Future Appointment Scheduled    Medication is being filled for 1 time refill only due to:  Patient needs labs TSH. Future labs ordered TSH. Patient needs to be seen because it has been more than one year since last visit.   Meche Tse RN  [facility]

## 2020-05-22 DIAGNOSIS — E78.2 MIXED HYPERLIPIDEMIA: ICD-10-CM

## 2020-05-22 NOTE — TELEPHONE ENCOUNTER
Last time prescribed: 04/24/2019 , 90 tabs x 4 refills  Last office visit: 04/24/2019  Next appointment: 08/12/2020    Medication is being filled for 1 time refill only due to:  Patient needs labs lipid panel plus. Future labs ordered yes. Patient needs to be seen because it has been more than one year since last visit.     Has future visit, yet unsure if will be able to come, even  in August.     COVID-19 , extra 90 day refill authorization per Med Director override.      Marie Amador RN  May 26, 2020 11:03 AM

## 2020-05-26 RX ORDER — PRAVASTATIN SODIUM 20 MG
TABLET ORAL
Qty: 90 TABLET | Refills: 1 | Status: SHIPPED | OUTPATIENT
Start: 2020-05-26 | End: 2020-07-08

## 2020-05-26 RX ORDER — PRAVASTATIN SODIUM 10 MG
10 TABLET ORAL DAILY
Qty: 90 TABLET | Refills: 1 | Status: SHIPPED | OUTPATIENT
Start: 2020-05-26 | End: 2020-07-08

## 2020-06-08 ENCOUNTER — ANTICOAGULATION THERAPY VISIT (OUTPATIENT)
Dept: ANTICOAGULATION | Facility: CLINIC | Age: 80
End: 2020-06-08
Payer: COMMERCIAL

## 2020-06-08 DIAGNOSIS — I48.91 ATRIAL FIBRILLATION (H): ICD-10-CM

## 2020-06-08 DIAGNOSIS — Z79.01 LONG TERM CURRENT USE OF ANTICOAGULANT THERAPY: ICD-10-CM

## 2020-06-08 DIAGNOSIS — I48.0 PAROXYSMAL ATRIAL FIBRILLATION (H): ICD-10-CM

## 2020-06-08 LAB
CAPILLARY BLOOD COLLECTION: NORMAL
INR PPP: 3.9 (ref 0.86–1.14)

## 2020-06-08 PROCEDURE — 36416 COLLJ CAPILLARY BLOOD SPEC: CPT | Performed by: INTERNAL MEDICINE

## 2020-06-08 PROCEDURE — 99207 ZZC NO CHARGE NURSE ONLY: CPT

## 2020-06-08 PROCEDURE — 85610 PROTHROMBIN TIME: CPT | Performed by: INTERNAL MEDICINE

## 2020-06-08 NOTE — PROGRESS NOTES
ANTICOAGULATION FOLLOW-UP CLINIC VISIT    Patient Name:  Fredrick Mehta  Date:  6/8/2020  Contact Type:  Telephone    SUBJECTIVE:  Patient Findings     Comments:   The patient was assessed for diet, medication, and activity level changes, missed or extra doses, bruising or bleeding, with no problem findings.          Clinical Outcomes     Comments:   The patient was assessed for diet, medication, and activity level changes, missed or extra doses, bruising or bleeding, with no problem findings.             OBJECTIVE    Recent labs: (last 7 days)     06/08/20  1442   INR 3.90*       ASSESSMENT / PLAN  INR assessment SUPRA    Recheck INR In: 1 WEEK    INR Location Clinic      Anticoagulation Summary  As of 6/8/2020    INR goal:   2.0-3.0   TTR:   59.8 % (1 y)   INR used for dosing:   3.90! (6/8/2020)   Warfarin maintenance plan:   5 mg (5 mg x 1) every Mon, Thu; 7.5 mg (5 mg x 1.5) all other days   Full warfarin instructions:   6/8: Hold; Otherwise 5 mg every Mon, Thu; 7.5 mg all other days   Weekly warfarin total:   47.5 mg   Plan last modified:   Jessica Coker RN (10/20/2017)   Next INR check:   6/15/2020   Priority:   High   Target end date:       Indications    Atrial fibrillation (H) [I48.91]  Long-term (current) use of anticoagulants [Z79.01] [Z79.01]             Anticoagulation Episode Summary     INR check location:   Anticoagulation Clinic    Preferred lab:       Send INR reminders to:   VALE CASTILLO    Comments:   Patient having INR drawn through [facility] [facility] through May 2018. Reach patient [phone].      Anticoagulation Care Providers     Provider Role Specialty Phone number    Yobani Sarkar MD Referring Cardiology [phone]    Pieter Ulloa MD Responsible Family Practice [phone]            See the Encounter Report to view Anticoagulation Flowsheet and Dosing Calendar (Go to Encounters tab in chart review, and find the Anticoagulation Therapy Visit)        Heydi  ELMER Guajardo, RN

## 2020-06-15 ENCOUNTER — ANTICOAGULATION THERAPY VISIT (OUTPATIENT)
Dept: NURSING | Facility: CLINIC | Age: 80
End: 2020-06-15

## 2020-06-15 DIAGNOSIS — I48.91 ATRIAL FIBRILLATION (H): ICD-10-CM

## 2020-06-15 LAB
CAPILLARY BLOOD COLLECTION: NORMAL
INR PPP: 3 (ref 0.86–1.14)

## 2020-06-15 PROCEDURE — 36416 COLLJ CAPILLARY BLOOD SPEC: CPT | Performed by: INTERNAL MEDICINE

## 2020-06-15 PROCEDURE — 85610 PROTHROMBIN TIME: CPT | Performed by: INTERNAL MEDICINE

## 2020-06-15 PROCEDURE — 99207 ZZC NO CHARGE NURSE ONLY: CPT

## 2020-06-15 NOTE — PROGRESS NOTES
ANTICOAGULATION FOLLOW-UP CLINIC VISIT    Patient Name:  Fredrick Mehta  Date:  6/15/2020  Contact Type:  Telephone/ Pt's wife Rosalva    SUBJECTIVE:  Patient Findings     Positives:   Change in diet/appetite    Comments:   Spoke with pt's wife Rosalva. She reports that they don't drive so it is difficult to get greens from the grocery store. This may be why pt's INR was elevated last week. Suggested lowering the maintenance dose but Rosalva declined. She stated that she will do better at offering greens to pt. Rosalva refused to bring pt back sooner than 4 weeks for INR recheck. She reports that the Taxi is 50 dollars each way. Rosalva states that pt has an appointment in August with his PCP and will discuss a DOAC at that time. Also, Rosalva given the number for [facility] and [employer] to research whether those options would be easier on them.        Clinical Outcomes     Comments:   Spoke with pt's wife Rosalva. She reports that they don't drive so it is difficult to get greens from the grocery store. This may be why pt's INR was elevated last week. Suggested lowering the maintenance dose but Rosalva declined. She stated that she will do better at offering greens to pt. Rosalva refused to bring pt back sooner than 4 weeks for INR recheck. She reports that the Taxi is 50 dollars each way. Rosalva states that pt has an appointment in August with his PCP and will discuss a DOAC at that time. Also, Rosalva given the number for In Home [facility] service and [employer] to research whether those options would be easier on them.           OBJECTIVE    Recent labs: (last 7 days)     06/15/20  1340   INR 3.00*       ASSESSMENT / PLAN  INR assessment THER    Recheck INR In: 4 WEEKS    INR Location Outside lab      Anticoagulation Summary  As of 6/15/2020    INR goal:   2.0-3.0   TTR:   57.9 % (1 y)   INR used for dosing:   3.00 (6/15/2020)   Warfarin maintenance plan:   5 mg (5 mg x 1) every Mon, Thu; 7.5 mg (5 mg x 1.5) all  other days   Full warfarin instructions:   5 mg every Mon, Thu; 7.5 mg all other days   Weekly warfarin total:   47.5 mg   No change documented:   Maryjane Grey RN   Plan last modified:   Jessica Coker RN (10/20/2017)   Next INR check:   7/13/2020   Priority:   High   Target end date:       Indications    Atrial fibrillation (H) [I48.91]  Long-term (current) use of anticoagulants [Z79.01] [Z79.01]             Anticoagulation Episode Summary     INR check location:   Anticoagulation Clinic    Preferred lab:       Send INR reminders to:   VALE CASTILLO    Comments:   Patient having INR drawn through [facility] through May 2018. Reach patient [phone].      Anticoagulation Care Providers     Provider Role Specialty Phone number    [facility], Yobani Paniagua MD Referring Cardiology [phone]    Pieter Ulloa MD Responsible Family Practice [phone]            See the Encounter Report to view Anticoagulation Flowsheet and Dosing Calendar (Go to Encounters tab in chart review, and find the Anticoagulation Therapy Visit)    Pt INR is 3.0 today. See findings. Pt's wife advised to have pt continue taking 5 mg on Mondays, Thursdays and 7.5 mg all the other days. Recheck INR in 4 weeks on 7/13/20 at 2:30 pm. Rosalva aware if signs of clotting (pain, tenderness, swelling, color change in leg or arm, SOB) and bleeding occur (blood in stool, urine, large bruising, bleeding gums, nosebleeds) to have INR check sooner. If sx severe report to ER or concerned for stroke call 911. If general questions or concerns arise, call clinic.         Maryjane Grey, RN

## 2020-06-19 DIAGNOSIS — I48.20 CHRONIC ATRIAL FIBRILLATION (H): ICD-10-CM

## 2020-06-19 RX ORDER — DILTIAZEM HYDROCHLORIDE 240 MG/1
240 CAPSULE, COATED, EXTENDED RELEASE ORAL 2 TIMES DAILY
Qty: 180 CAPSULE | Refills: 0 | Status: SHIPPED | OUTPATIENT
Start: 2020-06-19 | End: 2020-08-12

## 2020-06-22 ENCOUNTER — TELEPHONE (OUTPATIENT)
Dept: FAMILY MEDICINE | Facility: CLINIC | Age: 80
End: 2020-06-22

## 2020-06-22 DIAGNOSIS — I48.20 CHRONIC ATRIAL FIBRILLATION (H): ICD-10-CM

## 2020-06-22 NOTE — TELEPHONE ENCOUNTER
[facility] Call Center    Phone Message    May a detailed message be left on voicemail: yes     Reason for Call: Other: PAtient's wife is calling to follow-up on a fax that was sent on 06/18/2020. Patient is looking to switch pharmacy. Please have Dr. Ulloa answer the questions on the fax and fax it back. Please call patient or his wife if there's any questions.      Action Taken: Message routed to:  [facility] [place] [facility]: [facility]    Travel Screening: Not Applicable

## 2020-06-22 NOTE — TELEPHONE ENCOUNTER
Spoke with wife. We have not received fax. I told her I would wait until tomorrow to see if it comes in and will return call to her.    Brianne

## 2020-06-22 NOTE — TELEPHONE ENCOUNTER
Please call wife or pt - we have not received this form she is talking about.  - please have them send to our fax # .  Meche Tse RN  [facility]

## 2020-07-08 ENCOUNTER — TELEPHONE (OUTPATIENT)
Dept: FAMILY MEDICINE | Facility: CLINIC | Age: 80
End: 2020-07-08

## 2020-07-08 DIAGNOSIS — E78.2 MIXED HYPERLIPIDEMIA: ICD-10-CM

## 2020-07-08 DIAGNOSIS — Z79.01 LONG TERM CURRENT USE OF ANTICOAGULANT THERAPY: ICD-10-CM

## 2020-07-08 RX ORDER — PRAVASTATIN SODIUM 10 MG
10 TABLET ORAL DAILY
Qty: 90 TABLET | Refills: 0 | Status: SHIPPED | OUTPATIENT
Start: 2020-07-08 | End: 2020-08-12

## 2020-07-08 RX ORDER — PRAVASTATIN SODIUM 20 MG
TABLET ORAL
Qty: 90 TABLET | Refills: 0 | Status: SHIPPED | OUTPATIENT
Start: 2020-07-08 | End: 2020-08-12

## 2020-07-08 RX ORDER — WARFARIN SODIUM 5 MG/1
TABLET ORAL
Qty: 135 TABLET | Refills: 1 | Status: CANCELLED | OUTPATIENT
Start: 2020-07-08

## 2020-07-08 RX ORDER — WARFARIN SODIUM 5 MG/1
TABLET ORAL
Qty: 135 TABLET | Refills: 0 | Status: SHIPPED | OUTPATIENT
Start: 2020-07-08 | End: 2020-08-12

## 2020-07-08 NOTE — TELEPHONE ENCOUNTER
[facility] Call Center    Phone Message    May a detailed message be left on voicemail: yes     Reason for Call: Other: Call Back:  Patient's wife is requesting a call back from Jovita in regards to [facility]. Patient's wife states she needs help on the 2nd step of [facility]. Please advise.     Action Taken: Message routed to:  [facility]: [facility]    Travel Screening: Not Applicable

## 2020-07-08 NOTE — TELEPHONE ENCOUNTER
Changing pharmacies - going to [facility]. Refill remaining on these 3 prescriptions sent to new pharmacy.    Ana Rosa Hubbard RN  07/08/20  3:44 PM

## 2020-07-13 ENCOUNTER — ANTICOAGULATION THERAPY VISIT (OUTPATIENT)
Dept: NURSING | Facility: CLINIC | Age: 80
End: 2020-07-13

## 2020-07-13 DIAGNOSIS — I48.91 ATRIAL FIBRILLATION (H): ICD-10-CM

## 2020-07-13 LAB
CAPILLARY BLOOD COLLECTION: NORMAL
INR PPP: 3.1 (ref 0.86–1.14)

## 2020-07-13 PROCEDURE — 36416 COLLJ CAPILLARY BLOOD SPEC: CPT | Performed by: INTERNAL MEDICINE

## 2020-07-13 PROCEDURE — 85610 PROTHROMBIN TIME: CPT | Performed by: INTERNAL MEDICINE

## 2020-07-13 PROCEDURE — 99207 ZZC NO CHARGE NURSE ONLY: CPT

## 2020-07-13 NOTE — PROGRESS NOTES
ANTICOAGULATION FOLLOW-UP CLINIC VISIT    Patient Name:  Fredrick Mehta  Date:  7/13/2020  Contact Type:  Telephone/ Pt's wife Rosalva    SUBJECTIVE:  Patient Findings     Comments:   Called and spoke with pt's wife Rosalva. Rosalva has transportation issues with bringing pt in for INR checks. Rosalva reports that on 8/12/20, they are going to discuss the possibility of switching to a DOAC with . Rosalva reports that pt has had no change in health,medication or activity. It is still a struggle to get greens in but Rosalva does not want to change pt's warfarin dose.        Clinical Outcomes     Comments:   Called and spoke with pt's wife Rosalva. Rosalva has transportation issues with bringing pt in for INR checks. Rosalva reports that on 8/12/20, they are going to discuss the possibility of switching to a DOAC with . Rosalva reports that pt has had no change in health,medication or activity. It is still a struggle to get greens in but Rosalva does not want to change pt's warfarin dose.           OBJECTIVE    Recent labs: (last 7 days)     07/13/20  1425   INR 3.10*       ASSESSMENT / PLAN  INR assessment SUPRA    Recheck INR In: 4 WEEKS    INR Location Outside lab      Anticoagulation Summary  As of 7/13/2020    INR goal:   2.0-3.0   TTR:   50.3 % (1 y)   INR used for dosing:   3.10! (7/13/2020)   Warfarin maintenance plan:   5 mg (5 mg x 1) every Mon, Thu; 7.5 mg (5 mg x 1.5) all other days   Full warfarin instructions:   7/13: 2.5 mg; Otherwise 5 mg every Mon, Thu; 7.5 mg all other days   Weekly warfarin total:   47.5 mg   Plan last modified:   Jessica Coker RN (10/20/2017)   Next INR check:   8/12/2020   Priority:   High   Target end date:       Indications    Atrial fibrillation (H) [I48.91]  Long-term (current) use of anticoagulants [Z79.01] [Z79.01]             Anticoagulation Episode Summary     INR check location:   Anticoagulation Clinic    Preferred lab:       Send INR reminders to:    VALE CASTILLO    Comments:   Patient having INR drawn through [facility] through May 2018. Reach patient [phone].      Anticoagulation Care Providers     Provider Role Specialty Phone number    [facility] Yobani Paniagua MD Referring Cardiology [phone]    Pieter Ulloa MD Responsible Family Practice [phone]            See the Encounter Report to view Anticoagulation Flowsheet and Dosing Calendar (Go to Encounters tab in chart review, and find the Anticoagulation Therapy Visit)    Pt INR is 3.1 today. See findings. Rosalva advised to have pt take 2.5 mg today 7/13/20 then have pt continue taking 5 mg on Mondays, Thursdays and 7.5 mg all the other days. Recheck INR on the same day as pt is seeing  on 8/12/20 at the [facility]. Rosalva and Fredrick aware if signs of clotting (pain, tenderness, swelling, color change in leg or arm, SOB) and bleeding occur (blood in stool, urine, large bruising, bleeding gums, nosebleeds) to have INR check sooner. If sx severe report to ER or concerned for stroke call 911. If general questions or concerns arise, call clinic.           Maryjane Grey RN

## 2020-07-15 ENCOUNTER — TELEPHONE (OUTPATIENT)
Dept: FAMILY MEDICINE | Facility: CLINIC | Age: 80
End: 2020-07-15

## 2020-07-15 NOTE — TELEPHONE ENCOUNTER
Anticoagulation clinic - can you please follow up with pt's wife, she has questions about his INR and also about diet.     Thank you,  Ana Rosa Hubbard RN  [facility]

## 2020-07-15 NOTE — TELEPHONE ENCOUNTER
[facility] Call Center    Phone Message    May a detailed message be left on voicemail: yes     Reason for Call: Question: pt's wife, Rosalva, wants to know if pt should be given greens for supper tonight or less or not at all. Please have nurse call Rosalva. Thank you.      Action Taken: Message routed to:  [facility]: Nurse Pool    Travel Screening: Not Applicable

## 2020-07-15 NOTE — TELEPHONE ENCOUNTER
Did speak with patient wife Rosalva and diet discussed.  They are trying to be more consistent with greens and will try to have 1-2 servings a week of a darker green vegetable/salad.      Michelle Crystal RN  [facility]

## 2020-07-17 ENCOUNTER — TRANSFERRED RECORDS (OUTPATIENT)
Dept: HEALTH INFORMATION MANAGEMENT | Facility: CLINIC | Age: 80
End: 2020-07-17

## 2020-07-17 ENCOUNTER — PATIENT OUTREACH (OUTPATIENT)
Dept: FAMILY MEDICINE | Facility: CLINIC | Age: 80
End: 2020-07-17

## 2020-07-17 NOTE — TELEPHONE ENCOUNTER
Spoke with pt's wife Rosalva re: transportation options.  Rosalva states they use Lyft/[employer] much of the time.  I mentioned [employer], Rosalva stated they have used [employer] in the past but they didn't feel like it was reliable.  Currently does not have MA.  No family nearby to assist with transportation.  Discussed possibly having her check pt's INR at home to reduce trips, she did not feel confident in her ability.  I will continue to check on options for pt to reduce transportation costs.     Pavithra Walker, SHANDRASW

## 2020-08-12 ENCOUNTER — OFFICE VISIT (OUTPATIENT)
Dept: FAMILY MEDICINE | Facility: CLINIC | Age: 80
End: 2020-08-12
Payer: COMMERCIAL

## 2020-08-12 VITALS
BODY MASS INDEX: 23.51 KG/M2 | OXYGEN SATURATION: 96 % | WEIGHT: 164.25 LBS | DIASTOLIC BLOOD PRESSURE: 68 MMHG | HEIGHT: 70 IN | HEART RATE: 58 BPM | TEMPERATURE: 97.7 F | SYSTOLIC BLOOD PRESSURE: 108 MMHG | RESPIRATION RATE: 15 BRPM

## 2020-08-12 DIAGNOSIS — Z79.01 LONG TERM CURRENT USE OF ANTICOAGULANT THERAPY: ICD-10-CM

## 2020-08-12 DIAGNOSIS — Z00.00 MEDICARE ANNUAL WELLNESS VISIT, SUBSEQUENT: Primary | ICD-10-CM

## 2020-08-12 DIAGNOSIS — E03.9 HYPOTHYROIDISM, UNSPECIFIED TYPE: ICD-10-CM

## 2020-08-12 DIAGNOSIS — H61.22 IMPACTED CERUMEN OF LEFT EAR: ICD-10-CM

## 2020-08-12 DIAGNOSIS — E78.2 MIXED HYPERLIPIDEMIA: ICD-10-CM

## 2020-08-12 DIAGNOSIS — I48.20 CHRONIC ATRIAL FIBRILLATION (H): ICD-10-CM

## 2020-08-12 DIAGNOSIS — E78.00 HYPERCHOLESTEROLEMIA: ICD-10-CM

## 2020-08-12 DIAGNOSIS — R41.3 MEMORY LOSS: ICD-10-CM

## 2020-08-12 LAB
ALT SERPL-CCNC: 28 U/L (ref 0–70)
AST SERPL-CCNC: 26 U/L (ref 0–55)
BUN SERPL-MCNC: 19 MG/DL (ref 7–30)
CALCIUM SERPL-MCNC: 9.6 MG/DL (ref 8.5–10.4)
CHLORIDE SERPLBLD-SCNC: 105 MMOL/L (ref 94–109)
CHOLEST SERPL-MCNC: 216 MG/DL (ref 0–200)
CHOLEST/HDLC SERPL: 3.8 {RATIO} (ref 0–5)
CO2 SERPL-SCNC: 30 MMOL/L (ref 20–32)
CREAT SERPL-MCNC: 0.9 MG/DL (ref 0.8–1.5)
EGFR CALCULATED (BLACK REFERENCE): 104.4
EGFR CALCULATED (NON BLACK REFERENCE): 86.3
FASTING SPECIMEN: NO
GLUCOSE SERPL-MCNC: 100 MG/DL (ref 60–99)
GLUCOSE SERPL-MCNC: 98 MG/DL (ref 60–99)
HDLC SERPL-MCNC: 57 MG/DL
INR PPP: 3.38 (ref 0.86–1.14)
LDLC SERPL CALC-MCNC: 115 MG/DL (ref 0–129)
POTASSIUM SERPL-SCNC: 4.2 MMOL/L (ref 3.4–5.3)
SODIUM SERPL-SCNC: 143 MMOL/L (ref 137.3–146.3)
TRIGL SERPL-MCNC: 217 MG/DL (ref 0–150)
TSH SERPL DL<=0.005 MIU/L-ACNC: 2.07 MU/L (ref 0.4–4)
VLDL-CHOLESTEROL: 43 (ref 7–32)

## 2020-08-12 RX ORDER — WARFARIN SODIUM 5 MG/1
TABLET ORAL
Qty: 135 TABLET | Refills: 4 | Status: SHIPPED | OUTPATIENT
Start: 2020-08-12 | End: 2020-09-28 | Stop reason: ALTCHOICE

## 2020-08-12 RX ORDER — DILTIAZEM HYDROCHLORIDE 240 MG/1
240 CAPSULE, COATED, EXTENDED RELEASE ORAL 2 TIMES DAILY
Qty: 180 CAPSULE | Refills: 4 | Status: SHIPPED | OUTPATIENT
Start: 2020-08-12 | End: 2020-09-21

## 2020-08-12 RX ORDER — PRAVASTATIN SODIUM 20 MG
TABLET ORAL
Qty: 90 TABLET | Refills: 4 | Status: SHIPPED | OUTPATIENT
Start: 2020-08-12 | End: 2021-10-19

## 2020-08-12 RX ORDER — PRAVASTATIN SODIUM 10 MG
10 TABLET ORAL DAILY
Qty: 90 TABLET | Refills: 4 | Status: SHIPPED | OUTPATIENT
Start: 2020-08-12 | End: 2021-10-19

## 2020-08-12 RX ORDER — LEVOTHYROXINE SODIUM 50 UG/1
50 TABLET ORAL DAILY
Qty: 90 TABLET | Refills: 4 | Status: SHIPPED | OUTPATIENT
Start: 2020-08-12 | End: 2021-10-19

## 2020-08-12 ASSESSMENT — MIFFLIN-ST. JEOR: SCORE: 1468.77

## 2020-08-12 NOTE — LETTER
August 24, 2020      Fredrick Janine  [address] [facility] [address]        Dear Fredrick,     It was great to see you and your wife last week.  Here's a copy of your lab results with my comments included.     Your TSH (thyroid) test is perfectly normal.     Overall, your lipid panel looks just fine.     Finally, your basic metabolic panel (which measures your glucose or sugar level, calcium, kidney function tests, and electrolytes) was completely normal, top to bottom.     Take care!     Pieter Hamilton   Basic Metabolic Panel ([place])   Result Value Ref Range    Glucose 98.0 60.0 - 99.0 mg/dL    Urea Nitrogen 19.0 7.0 - 30.0 mg/dL    Calcium 9.6 8.5 - 10.4 mg/dL    Creatinine 0.9 0.8 - 1.5 mg/dL    eGFR Calculated (Non Black Reference) 86.3 >60.0    eGFR Calculated (Black Reference) 104.4 >60.0    Sodium 143.0 137.3 - 146.3 mmol/L    Potassium 4.2 3.4 - 5.3 mmol/L    Chloride 105.0 94.0 - 109.0 mmol/L    Carbon Dioxide 30.0 20.0 - 32.0 mmol/L   Lipid Panel Plus ([place])   Result Value Ref Range    FASTING SPECIMEN no     Glucose 100.0 (H) 60.0 - 99.0 mg/dL    ALT 28.0 0.0 - 70.0 U/L    AST 26.0 0.0 - 55.0 U/L    Cholesterol 216.0 (H) 0.0 - 200.0    HDL Cholesterol 57.0 >40.0    Triglycerides 217.0 (H) 0.0 - 150.0    Cholesterol/HDL Ratio 3.8 0.0 - 5.0    LDL Cholesterol Direct 115.0 0.0 - 129.0    VLDL-Cholesterol 43.0 (H) 7.0 - 32.0   TSH with free T4 reflex   Result Value Ref Range    TSH 2.07 0.40 - 4.00 mU/L

## 2020-08-12 NOTE — NURSING NOTE
"79 year old  Chief Complaint   Patient presents with     Physical     INR Followup     wants to dicuss changing medications to not doing INR's anymore     Recheck Medication       Blood pressure 108/68, pulse 58, temperature 97.7  F (36.5  C), temperature source Skin, resp. rate 15, height 1.79 m (5' 10.47\"), weight 74.5 kg (164 lb 4 oz), SpO2 96 %. Body mass index is 23.25 kg/m .  Patient Active Problem List   Diagnosis     Atrial fibrillation (H)     Hypercholesterolemia     Preventative health care     Hypothyroidism     Glaucoma     Long-term (current) use of anticoagulants [Z79.01]     Chronic atrial fibrillation (H)     Acquired hypothyroidism     Memory loss     Primary open angle glaucoma of both eyes, moderate stage     Medicare annual wellness visit, subsequent     Other urinary incontinence       Wt Readings from Last 2 Encounters:   20 74.5 kg (164 lb 4 oz)   19 76.4 kg (168 lb 8 oz)     BP Readings from Last 3 Encounters:   20 108/68   19 92/60   19 136/74         Current Outpatient Medications   Medication     Acetaminophen (TYLENOL PO)     Cetirizine HCl (ZYRTEC ALLERGY PO)     diltiazem ER COATED BEADS (CARDIZEM CD/CARTIA XT) 240 MG 24 hr capsule     donepezil (ARICEPT) 10 MG tablet     IBUPROFEN PO     levothyroxine (SYNTHROID/LEVOTHROID) 50 MCG tablet     memantine (NAMENDA) 5 MG tablet     pravastatin (PRAVACHOL) 10 MG tablet     pravastatin (PRAVACHOL) 20 MG tablet     Travoprost 0.004 % SOLN     warfarin ANTICOAGULANT (COUMADIN ANTICOAGULANT) 5 MG tablet     No current facility-administered medications for this visit.        Social History     Tobacco Use     Smoking status: Former Smoker     Packs/day: 1.00     Types: Cigarettes     Start date:      Last attempt to quit:      Years since quittin.6     Smokeless tobacco: Never Used   Substance Use Topics     Alcohol use: No     Drug use: No       Health Maintenance Due   Topic Date Due     ADVANCE CARE " PLANNING  1940     ZOSTER IMMUNIZATION (2 of 3) 04/05/2012     FALL RISK ASSESSMENT  06/06/2019     PHQ-2  01/01/2020     MEDICARE ANNUAL WELLNESS VISIT  04/24/2020       No results found for: PAP      August 12, 2020 1:47 PM

## 2020-08-13 ENCOUNTER — TELEPHONE (OUTPATIENT)
Dept: FAMILY MEDICINE | Facility: CLINIC | Age: 80
End: 2020-08-13

## 2020-08-13 ENCOUNTER — ANTICOAGULATION THERAPY VISIT (OUTPATIENT)
Dept: NURSING | Facility: CLINIC | Age: 80
End: 2020-08-13
Payer: COMMERCIAL

## 2020-08-13 DIAGNOSIS — I48.0 PAROXYSMAL ATRIAL FIBRILLATION (H): ICD-10-CM

## 2020-08-13 DIAGNOSIS — Z79.01 LONG TERM CURRENT USE OF ANTICOAGULANT THERAPY: ICD-10-CM

## 2020-08-13 PROCEDURE — 99207 ZZC NO CHARGE NURSE ONLY: CPT

## 2020-08-13 NOTE — TELEPHONE ENCOUNTER
Anticoagulation Management    Discussed INR home monitoring program with Fredrick Mehta reviewing:      [employer] requirements: >= 3 months of anticoagulation therapy, indication for chronic anticoagulation and order from provider    Required testing frequency (q1-2 weeks)    Home meters, testing supplies, meter training, and reporting of INR results done through an outside company. Patient would be contacted by home monitoring company to review insurance coverage with home monitoring company prior to enrolling.    [facility] would continue to receive and manage INR results.    Home monitoring application may take several weeks and must continue to follow up with recommended INR monitoring in clinic until receives monitor and training completed.     Home monitoring terms reviewed with patient      Patient agrees to frequency of testing as directed by referring provider ( weekly or biweekly) Yes    Testing to be performed during business hours of [facility] Yes    Patient agrees they have the skill ( or a designated caregiver) necessary to perform the self test Yes    Patient agrees to report all INR results to INR home monitoring company Yes    Patient agrees to have additional INR test in clinic if a home result is critical Yes    Patient agrees to schedule an INR test at a [facility] clinic yearly for technique observation and quality check of INR results with their home meter Yes    Patient agrees to use a [facility] approved service provider and device for home monitoring Yes    [facility]    Referring provider: Pieter Ulloa MD    Referring providers Clinic Fax number     Fredrick Mehta is interested home INR monitoring and requests order be submitted.

## 2020-08-13 NOTE — TELEPHONE ENCOUNTER
Dr Ulloa considering INR home monitor for pt , as currently he and his wife are taking taxis to get to clinic in [place] to have labs drawn.   Call to [facility] [phone] - spoke to Jessika - she will work with wife and pt to order INR home monitor.   Order will be faxed here for Dr Ulloa to sign. They will also work with wife and pt to set this up.  Meche Tse RN  [facility]

## 2020-08-13 NOTE — PROGRESS NOTES
ANTICOAGULATION FOLLOW-UP CLINIC VISIT    Patient Name:  Fredrick Mehta  Date:  8/13/2020  Contact Type:  Telephone    SUBJECTIVE:  Patient Findings     Comments:   The patient was assessed for diet, medication, and activity level changes, missed or extra doses, bruising or bleeding, with no problem findings. Will try and increase greens sl        Clinical Outcomes     Comments:   The patient was assessed for diet, medication, and activity level changes, missed or extra doses, bruising or bleeding, with no problem findings. Will try and increase greens sl           OBJECTIVE    Recent labs: (last 7 days)     08/12/20  1450   INR 3.38*       ASSESSMENT / PLAN  INR assessment SUPRA    Recheck INR In: 2 WEEKS    INR Location Clinic      Anticoagulation Summary  As of 8/13/2020    INR goal:   2.0-3.0   TTR:   46.5 % (1 y)   INR used for dosing:   3.38! (8/12/2020)   Warfarin maintenance plan:   5 mg (5 mg x 1) every Mon, Thu; 7.5 mg (5 mg x 1.5) all other days   Full warfarin instructions:   8/15: 5 mg; 8/22: 5 mg; 8/29: 5 mg; Otherwise 5 mg every Mon, Thu; 7.5 mg all other days   Weekly warfarin total:   47.5 mg   Plan last modified:   Jessica Coker RN (10/20/2017)   Next INR check:   8/27/2020   Priority:   High   Target end date:       Indications    Atrial fibrillation (H) [I48.91]  Long-term (current) use of anticoagulants [Z79.01] [Z79.01]             Anticoagulation Episode Summary     INR check location:   Anticoagulation Clinic    Preferred lab:       Send INR reminders to:   VALE CASTILLO    Comments:   Patient having INR drawn through [facility] [place] through May 2018. Reach patient [phone].      Anticoagulation Care Providers     Provider Role Specialty Phone number    Si, Yobani Paniagua MD Referring Cardiology [phone]    Pieter Ulloa MD Responsible Family Practice [phone]            See the Encounter Report to view Anticoagulation Flowsheet and Dosing Calendar (Go to  Encounters tab in chart review, and find the Anticoagulation Therapy Visit)        Heydi Guajardo RN

## 2020-08-15 NOTE — PROGRESS NOTES
"CHIEF COMPLAINT:  Medicare annual wellness visit, subsequent.      HISTORY OF PRESENT ILLNESS:  Fredrick is an 79-year-old accompanied by his wife to today's visit.  He has significant memory loss and is primarily short term.  He sees a neurologist.  He is on donepezil.  He is also on Namenda.      In addition to all that, he has a history of atrial fibrillation and he is on long-term warfarin.  Unfortunately, it has become very expensive for them to take Uber or other modes of transportation to get to his monthly appointment.  He does not go through our Warfarin Clinic at the [place] but is in the system.  Therefore, we are going to try and set him up with a home monitor.  Given his risk of bleeding with a fall at his age, I think it would be appropriate to stay on warfarin rather than one of the newer anticoagulants.      ACTIVE MEDICAL PROBLEMS:  Reviewed.      CURRENT MEDICATIONS:  Reviewed.      His scalp is brightly erythematous in spots.  He recently saw his dermatologist and several lesions were frozen.  We talked about various screening modalities.  We decided not to do a PSA today.      MEDICARE QUESTIONS:  Activities of daily living are fairly good.  He is not driving anymore.  He is able to shower and dress himself.  He needs help with other things.  He has had a lot of near falls, but he has not actually fallen and hurt himself.  He is not using a cane or walker.  He is not particularly interested in doing so.  His wife describes him as \"lurching\" at times.  He does not feel that he is suffering from any depression and she would agree.  Memory loss as noted.      HEALTHCARE MAINTENANCE:  He wears glasses and his eyes are generally checked twice a year as he has some glaucoma.  He sees a dermatologist on a regular basis and he has a history of some superficial skin cancers that have been followed closely.  Blood pressure is 108/68.  BMI 23.25.  His weight is about 4 pounds less than he was a year ago.  " He is holding quite steady and he has a good appetite.  He gets some activity.  We are not going to do any colon or prostate cancer screening as mentioned.  Immunizations are up to date and I would recommend that he get a flu shot when it is available.  From a lab standpoint, we are going to check his TSH and a lipid panel plus, as well as a metabolic panel and his INR.      OBJECTIVE:  Fredrick is in no distress.  His affect is very upbeat.  He recognized me and we had a good conversation.  His wife was present throughout the entire visit.  Both were wearing masks.  Multiple erythematous spots are seen on his scalp and forehead where they were frozen by his dermatologist.  /68 with a pulse of 58.  Temperature is 97.7.  He is 5 feet 10.5 inches in height and weighs 164 pounds 4 ounces with a BMI of 23.25.  O2 sats are 96% on room air.  He is clearly suffering from short term memory loss.  He essentially knew where he was and knew who I was and who he was.   HEENT:  Head is normocephalic, atraumatic.  He has cerumen impaction in his left ear.  The right ear is just fine.  No pain with palpation of the sinuses.   NECK:  Supple without any anterior or posterior chain adenopathy.  No carotid bruits are appreciated.   BACK:  Smooth and straight.   LUNGS:  Clear to auscultation bilaterally.   HEART:  Regular rate and rhythm despite the fact that he has chronic atrial fibrillation.   EXTREMITIES:  Ankles are free of edema.  Good peripheral pulses.      LABORATORY:  Pending.      ASSESSMENT AND PLAN:   1.  Medicare annual wellness visit, subsequent.  Up to date with healthcare maintenance strategies.   2.  Flu shot in September recommended.  All other immunizations are up to date.   3.  Memory loss, followed by Neurology.  His wife is working with them very carefully.  On both Aricept and Namenda.   4.  Hypercholesterolemia, labs pending.   5.  Hypothyroidism, labs pending.   6.  Chronic atrial fibrillation, on long-term  current use of anticoagulation therapy (warfarin).  We are going to arrange for him to get a home monitor so it will save him a lot of time and money.   7.  Cerumen impaction of the left ear.  This will be washed out today.   8.  Glaucoma, followup with Ophthalmology as I think it is important that we maintain his senses as much as we possibly can.   9.  He will be notified of lab results when they are available.  Call with any problems or questions.

## 2020-08-27 ENCOUNTER — ANTICOAGULATION THERAPY VISIT (OUTPATIENT)
Dept: FAMILY MEDICINE | Facility: CLINIC | Age: 80
End: 2020-08-27

## 2020-08-27 DIAGNOSIS — I48.91 ATRIAL FIBRILLATION (H): ICD-10-CM

## 2020-08-27 DIAGNOSIS — Z79.01 LONG TERM CURRENT USE OF ANTICOAGULANT THERAPY: ICD-10-CM

## 2020-08-27 LAB
CAPILLARY BLOOD COLLECTION: NORMAL
INR PPP: 2.1 (ref 0.86–1.14)

## 2020-08-27 PROCEDURE — 85610 PROTHROMBIN TIME: CPT | Performed by: INTERNAL MEDICINE

## 2020-08-27 PROCEDURE — 36416 COLLJ CAPILLARY BLOOD SPEC: CPT | Performed by: INTERNAL MEDICINE

## 2020-08-27 NOTE — PROGRESS NOTES
ANTICOAGULATION MANAGEMENT     Patient Name:  Fredrick Mehta  Date:  2020    ASSESSMENT /SUBJECTIVE:    Today's INR result of 2.1 is therapeutic. Goal INR of 2.0-3.0      Warfarin dose taken: Warfarin taken as previously instructed    Diet: Increased greens/vitamin K intake may be affecting INR    Medication changes/ interactions: No new medications/supplements affecting INR    Previous INR: Supratherapeutic     S/S of bleeding or thromboembolism: No    New injury or illness: No    Upcoming surgery, procedure or cardioversion: No    Additional findings: None      PLAN:    Spoke with patient's wife regarding INR result and instructed:     Warfarin Dosing Instructions: Continue your current warfarin dose 5 mg M,TH, Sat; 7.5 mg ROW    Instructed patient to follow up no later than: 3 weeks  Lab visit scheduled    Education provided: Impact of vitamin K foods on INR      Rosalva verbalizes understanding and agrees to warfarin dosing plan.    Instructed to call the Anticoagulation Clinic for any changes, questions or concerns. (#[phone])        Kimmy Ashley, RN      OBJECTIVE:  Recent labs: (last 7 days)     20  1429   INR 2.10*         INR assessment THER    Recheck INR In: 3 WEEKS    INR Location Clinic      Anticoagulation Summary  As of 2020    INR goal:   2.0-3.0   TTR:   49.3 % (1 y)   INR used for dosin.10 (2020)   Warfarin maintenance plan:   5 mg (5 mg x 1) every Mon, Thu, Sat; 7.5 mg (5 mg x 1.5) all other days   Full warfarin instructions:   : 5 mg; Otherwise 5 mg every Mon, Thu, Sat; 7.5 mg all other days   Weekly warfarin total:   45 mg   Plan last modified:   Sonam Hyatt, RN (2020)   Next INR check:   2020   Priority:   High   Target end date:       Indications    Atrial fibrillation (H) [I48.91]  Long-term (current) use of anticoagulants [Z79.01] [Z79.01]             Anticoagulation Episode Summary     INR check location:   Anticoagulation Clinic     Preferred lab:       Send INR reminders to:   VALE CASTILLO    Comments:   Patient having INR drawn through [employer] [place] through May 2018. Reach patient [phone].      Anticoagulation Care Providers     Provider Role Specialty Phone number    [facility], Yobani Paniagua MD Referring Cardiology [phone]    Pieter Ulloa MD [facility] [phone]

## 2020-09-16 ENCOUNTER — TELEPHONE (OUTPATIENT)
Dept: FAMILY MEDICINE | Facility: CLINIC | Age: 80
End: 2020-09-16

## 2020-09-16 NOTE — TELEPHONE ENCOUNTER
[facility] Call Center    Phone Message    May a detailed message be left on voicemail: yes     Reason for Call: Requesting Results   Name/type of test: labs from physical  Date of test: 8/12/2020  Was test done at a location other than [facility] (Please fill in the location if not [facility])?: No   Still waiting for levothyroxine, Pt wife said that they were waiting for results on TSH. Pt is running very low on this.      Action Taken: Message routed to:  [facility] [facility]    [place]ening: Not Applicable

## 2020-09-16 NOTE — TELEPHONE ENCOUNTER
Wife Rosalva states they did not receive lab results, will ask  to re-send the letter from 8/24/20. Called the pharmacy and they did receive all the Rx from 8/12/20 and some of these, including levothyroxine and warfarin are due to ship in the future due to insurance stating too soon. Levothyroxine will fill on 9/21/20 and warfarin will fill in October. Called Rosalva again and informed her, pt has enough medication to get through to this next fill.     Ana Rosa Hubbard RN  09/16/20  10:01 AM

## 2020-09-17 ENCOUNTER — ANTICOAGULATION THERAPY VISIT (OUTPATIENT)
Dept: NURSING | Facility: CLINIC | Age: 80
End: 2020-09-17

## 2020-09-17 ENCOUNTER — TELEPHONE (OUTPATIENT)
Dept: FAMILY MEDICINE | Facility: CLINIC | Age: 80
End: 2020-09-17

## 2020-09-17 DIAGNOSIS — Z79.01 LONG TERM CURRENT USE OF ANTICOAGULANT THERAPY: ICD-10-CM

## 2020-09-17 DIAGNOSIS — I48.91 ATRIAL FIBRILLATION (H): ICD-10-CM

## 2020-09-17 DIAGNOSIS — I48.91 ATRIAL FIBRILLATION (H): Primary | ICD-10-CM

## 2020-09-17 LAB
CAPILLARY BLOOD COLLECTION: NORMAL
INR PPP: 3.9 (ref 0.86–1.14)

## 2020-09-17 PROCEDURE — 36416 COLLJ CAPILLARY BLOOD SPEC: CPT | Performed by: INTERNAL MEDICINE

## 2020-09-17 PROCEDURE — 85610 PROTHROMBIN TIME: CPT | Performed by: INTERNAL MEDICINE

## 2020-09-17 NOTE — PROGRESS NOTES
ANTICOAGULATION MANAGEMENT     Patient Name:  Fredrick Mehta  Date:  9/17/2020    ASSESSMENT /SUBJECTIVE:    Today's INR result of 3.9 is supratherapeutic. Goal INR of 2.0-3.0      Warfarin dose taken: Warfarin taken as previously instructed    Diet: Decreased greens/vitamin K intake may be affecting INR    Medication changes/ interactions: No new medications/supplements affecting INR    Previous INR: Therapeutic     S/S of bleeding or thromboembolism: No    New injury or illness: No    Upcoming surgery, procedure or cardioversion: No    Additional findings: patient wife has cut out dark greens since the last check due to his INR being 2.1.  Did discuss diet and she will incorporate greens back into the diet and recheck in 2 weeks.       PLAN:    Spoke with Rosalva regarding INR result and instructed:     Warfarin Dosing Instructions: 2.5mg then continue your current warfarin dose of 5mg M/THU/SAT and 7.5mg AOD    Instructed patient to follow up no later than: 2 weeks  Lab visit scheduled    Education provided: Importance of consistent vitamin K intake and Impact of vitamin K foods on INR      Rosalva verbalizes understanding and agrees to warfarin dosing plan.    Instructed to call the Anticoagulation Clinic for any changes, questions or concerns. (#[phone])        Michelle Crystal RN      OBJECTIVE:  Recent labs: (last 7 days)     09/17/20  1425   INR 3.90*         No question data found.  Anticoagulation Summary  As of 9/17/2020    INR goal:   2.0-3.0   TTR:   52.1 % (1 y)   INR used for dosing:   No new INR was available at the time of this encounter.   Warfarin maintenance plan:   5 mg (5 mg x 1) every Mon, Thu, Sat; 7.5 mg (5 mg x 1.5) all other days   Full warfarin instructions:   9/17: 2.5 mg; Otherwise 5 mg every Mon, Thu, Sat; 7.5 mg all other days   Weekly warfarin total:   45 mg   Plan last modified:   Sonam Hyatt, RN (8/27/2020)   Next INR check:   10/1/2020   Priority:   High   Target  end date:       Indications    Atrial fibrillation (H) [I48.91]  Long-term (current) use of anticoagulants [Z79.01] [Z79.01]             Anticoagulation Episode Summary     INR check location:   Anticoagulation Clinic    Preferred lab:       Send INR reminders to:   VALE CASTILLO    Comments:   Patient having INR drawn through [facility] through May 2018. Reach patient [phone].      Anticoagulation Care Providers     Provider Role Specialty Phone number    [facility], Yobani Paniagua MD Referring Cardiology [phone]    Pieter Ulloa MD [facility] [phone]

## 2020-09-21 ENCOUNTER — OFFICE VISIT (OUTPATIENT)
Dept: CARDIOLOGY | Facility: CLINIC | Age: 80
End: 2020-09-21
Payer: COMMERCIAL

## 2020-09-21 VITALS
SYSTOLIC BLOOD PRESSURE: 114 MMHG | HEART RATE: 69 BPM | BODY MASS INDEX: 22.82 KG/M2 | DIASTOLIC BLOOD PRESSURE: 71 MMHG | WEIGHT: 163 LBS | HEIGHT: 71 IN

## 2020-09-21 DIAGNOSIS — I48.0 PAROXYSMAL ATRIAL FIBRILLATION (H): Primary | ICD-10-CM

## 2020-09-21 PROCEDURE — 93000 ELECTROCARDIOGRAM COMPLETE: CPT | Performed by: INTERNAL MEDICINE

## 2020-09-21 PROCEDURE — 99214 OFFICE O/P EST MOD 30 MIN: CPT | Performed by: INTERNAL MEDICINE

## 2020-09-21 RX ORDER — DILTIAZEM HYDROCHLORIDE 180 MG/1
180 CAPSULE, COATED, EXTENDED RELEASE ORAL 2 TIMES DAILY
Qty: 90 CAPSULE | Refills: 3 | Status: SHIPPED | OUTPATIENT
Start: 2020-10-19 | End: 2021-01-29

## 2020-09-21 ASSESSMENT — MIFFLIN-ST. JEOR: SCORE: 1471.49

## 2020-09-21 NOTE — LETTER
9/21/2020    Pieter Ulloa MD, MD  [address] [facility] [address]  [facility] [address]    RE: Fredrick Mehta       Dear Colleague,    I had the pleasure of seeing Fredrick Mehta in the [facility].    PHYSICIAN NOTE:  This visit was completed in person at the [facility].      I had the pleasure evaluating Mr. Mehta today.      The patient is a very pleasant 79-year-old retired  with the following medical issues:  1.  Paroxysmal atrial fibrillation.  Previously followed at the [facility].  Managed with rate control and warfarin.  [id number] score of 2.    2.  Dyslipidemia.   3.  Alzheimer's dementia, on donepezil and memantine.   4.  Hypothyroidism.     He has been feeling reasonably well.  He is becoming increasingly forgetful.  His wife accompanied him today.  One recent issue has been his widely fluctuating INR in the past few months.  It caused at least $30 for a cab ride to get his INR checked.    He has had some lightheadedness upon standing.  No syncope or near syncope.  No chest pain with exertion.  Both kids and his wife have been pretty much at home ever since the COVID pandemic started.    PHYSICAL EXAMINATION:  General:  elderly male, in no apparent distress, slender body habitus  ENT/Mouth:  no nasal discharge.  Eyes:  normal conjunctivae.  No jaundice.  Neck:  no thyromegaly or lymphadenopathy.  Chest/Lungs:  patient is not dyspneic.  Lungs CTA, without rales or wheezing.    Cardiovascular: Normal JVP, rate is bradycardic and regular.  No gallop, murmur or rub.    Abdomen:  no abdominal distention.  Negative HJR.    Extremities:  no apparent cyanosis.  Skin:  no xanthelasma.  No facial laceration.  Neurologic:  alert & oriented x 3.  Normal arm motion bilateral, no tremors.    Vascular:  2+ carotids without bruits.  2+ radials.        DIAGNOSTIC STUDIES:  ECG: Sinus bradycardia at 53.  Recent INR  3.9.      IMPRESSION:  1. Paroxysmal atrial fibrillation with RVR.  Resting heart rate is in the low 50s and the patient has had orthostatic lightheadedness.  I will decrease diltiazem CD which is currently at 240 mg twice daily.  2. Fluctuating INR.  It may be worthwhile switching to a DOAC.  Eliquis 5 mg twice daily is reasonable alternative to warfarin.  I asked the patient and his wife to call their pharmacy and find out the cost of Eliquis.  If acceptable, they should call back and we would forward a prescription.  3. Orthostatic lightheadedness.  As above, decrease diltiazem.    RECOMMENDATIONS:  1. Decrease diltiazem CD to 180 mg twice daily.  2. Explore the option of switching warfarin to apixaban.  3. Follow-up with Supriya in 1 year.    It was my pleasure seeing this delightful patient.      Joseluis Moscoso MD, [facility]        Orders Placed This Encounter   Procedures     Follow-Up with Cardiac Advanced Practice Provider     EKG 12-lead complete w/read - Clinics (to be scheduled)       Orders Placed This Encounter   Medications     diltiazem ER COATED BEADS (CARDIZEM CD/CARTIA XT) 180 MG 24 hr capsule     Sig: Take 1 capsule (180 mg) by mouth 2 times daily     Dispense:  90 capsule     Refill:  3       Medications Discontinued During This Encounter   Medication Reason     diltiazem ER COATED BEADS (CARDIZEM CD/CARTIA XT) 240 MG 24 hr capsule Reorder         Encounter Diagnoses   Name Primary?     Paroxysmal atrial fibrillation (H) Yes     Chronic atrial fibrillation (H)        CURRENT MEDICATIONS:  Current Outpatient Medications   Medication Sig Dispense Refill     Acetaminophen (TYLENOL PO)        Cetirizine HCl (ZYRTEC ALLERGY PO) Take  by mouth daily as needed.       [START ON 10/19/2020] diltiazem ER COATED BEADS (CARDIZEM CD/CARTIA XT) 180 MG 24 hr capsule Take 1 capsule (180 mg) by mouth 2 times daily 90 capsule 3     donepezil (ARICEPT) 10 MG tablet Take 1 tablet (10 mg) by mouth At Bedtime 30 tablet 0      IBUPROFEN PO        levothyroxine (SYNTHROID/LEVOTHROID) 50 MCG tablet Take 1 tablet (50 mcg) by mouth daily 90 tablet 4     memantine (NAMENDA) 5 MG tablet 10 mg   1     pravastatin (PRAVACHOL) 10 MG tablet Take 1 tablet (10 mg) by mouth daily 90 tablet 4     pravastatin (PRAVACHOL) 20 MG tablet TAKE ONE TABLET BY MOUTH EVERY EVENING. TAKE TOGETHER WITH 10MG 90 tablet 4     Travoprost 0.004 % SOLN Place 1 drop into both eyes At Bedtime Please schedule a follow-up appointment with Dr Joel at [phone]. No further refills until seen 7.5 mL 0     warfarin ANTICOAGULANT (COUMADIN ANTICOAGULANT) 5 MG tablet Take 5 mg by mouth M and Th, and 7.5 mg ROW, or as directed per Coumadin clinic. 135 tablet 4       ALLERGIES     Allergies   Allergen Reactions     Hay Fever & [A.R.M.]      Latex      Seasonal Allergies        PAST MEDICAL HISTORY:  Past Medical History:   Diagnosis Date     Anatomical narrow angle      Benign positional vertigo      Cataracts      Glaucoma     OAG     Hypercholesterolemia      Migraine      Paroxysmal atrial fibrillation (H)        PAST SURGICAL HISTORY:  Past Surgical History:   Procedure Laterality Date     PROSTATE SURGERY  1999     SELECTIVE LASER TRABECULOPLASTY (SLT) OD (RIGHT EYE)  6/2007    RE       FAMILY HISTORY:  Family History   Problem Relation Age of Onset     Glaucoma Maternal Grandfather      Macular Degeneration No family hx of        SOCIAL HISTORY:  Social History     Socioeconomic History     Marital status:      Spouse name: None     Number of children: None     Years of education: None     Highest education level: None   Occupational History     None   Social Needs     Financial resource strain: None     Food insecurity     Worry: None     Inability: None     Transportation needs     Medical: None     Non-medical: None   Tobacco Use     Smoking status: Former Smoker     Packs/day: 1.00     Years: 28.00     Pack years: 28.00     Types: Cigarettes     Start  date:      Last attempt to quit:      Years since quittin.7     Smokeless tobacco: Never Used   Substance and Sexual Activity     Alcohol use: No     Drug use: No     Sexual activity: None   Lifestyle     Physical activity     Days per week: None     Minutes per session: None     Stress: None   Relationships     Social connections     Talks on phone: None     Gets together: None     Attends Faith service: None     Active member of club or organization: None     Attends meetings of clubs or organizations: None     Relationship status: None     Intimate partner violence     Fear of current or ex partner: None     Emotionally abused: None     Physically abused: None     Forced sexual activity: None   Other Topics Concern     Parent/sibling w/ CABG, MI or angioplasty before 65F 55M? Not Asked   Social History Narrative     None       Review of Systems:  Skin:  Negative       Eyes:  Positive for glasses    ENT:  Negative      Respiratory:  Negative       Cardiovascular:  Negative      Gastroenterology: Negative      Genitourinary:  Negative      Musculoskeletal:  Negative      Neurologic:  Negative      Psychiatric:  Negative      Heme/Lymph/Imm:  Negative      Endocrine:  Negative            Thank you for allowing me to participate in the care of your patient.    Sincerely,     Joseluis Moscoso MD     [facility]

## 2020-09-21 NOTE — PROGRESS NOTES
PHYSICIAN NOTE:  This visit was completed in person at the [facility].      I had the pleasure evaluating Mr. Mehta today.      The patient is a very pleasant 79-year-old retired  with the following medical issues:  1.  Paroxysmal atrial fibrillation.  Previously followed at the [facility].  Managed with rate control and warfarin.  [id number] score of 2.    2.  Dyslipidemia.   3.  Alzheimer's dementia, on donepezil and memantine.   4.  Hypothyroidism.     He has been feeling reasonably well.  He is becoming increasingly forgetful.  His wife accompanied him today.  One recent issue has been his widely fluctuating INR in the past few months.  It caused at least $30 for a cab ride to get his INR checked.    He has had some lightheadedness upon standing.  No syncope or near syncope.  No chest pain with exertion.  Both kids and his wife have been pretty much at home ever since the COVID pandemic started.    PHYSICAL EXAMINATION:  General:  elderly male, in no apparent distress, slender body habitus  ENT/Mouth:  no nasal discharge.  Eyes:  normal conjunctivae.  No jaundice.  Neck:  no thyromegaly or lymphadenopathy.  Chest/Lungs:  patient is not dyspneic.  Lungs CTA, without rales or wheezing.    Cardiovascular: Normal JVP, rate is bradycardic and regular.  No gallop, murmur or rub.    Abdomen:  no abdominal distention.  Negative HJR.    Extremities:  no apparent cyanosis.  Skin:  no xanthelasma.  No facial laceration.  Neurologic:  alert & oriented x 3.  Normal arm motion bilateral, no tremors.    Vascular:  2+ carotids without bruits.  2+ radials.        DIAGNOSTIC STUDIES:  ECG: Sinus bradycardia at 53.  Recent INR 3.9.      IMPRESSION:  1. Paroxysmal atrial fibrillation with RVR.  Resting heart rate is in the low 50s and the patient has had orthostatic lightheadedness.  I will decrease diltiazem CD which is currently at 240 mg twice daily.  2. Fluctuating  INR.  It may be worthwhile switching to a DOAC.  Eliquis 5 mg twice daily is reasonable alternative to warfarin.  I asked the patient and his wife to call their pharmacy and find out the cost of Eliquis.  If acceptable, they should call back and we would forward a prescription.  3. Orthostatic lightheadedness.  As above, decrease diltiazem.    RECOMMENDATIONS:  1. Decrease diltiazem CD to 180 mg twice daily.  2. Explore the option of switching warfarin to apixaban.  3. Follow-up with Supriya in 1 year.    It was my pleasure seeing this delightful patient.      Joseluis Moscoso MD, [facility]        Orders Placed This Encounter   Procedures     Follow-Up with Cardiac Advanced Practice Provider     EKG 12-lead complete w/read - Clinics (to be scheduled)       Orders Placed This Encounter   Medications     diltiazem ER COATED BEADS (CARDIZEM CD/CARTIA XT) 180 MG 24 hr capsule     Sig: Take 1 capsule (180 mg) by mouth 2 times daily     Dispense:  90 capsule     Refill:  3       Medications Discontinued During This Encounter   Medication Reason     diltiazem ER COATED BEADS (CARDIZEM CD/CARTIA XT) 240 MG 24 hr capsule Reorder         Encounter Diagnoses   Name Primary?     Paroxysmal atrial fibrillation (H) Yes     Chronic atrial fibrillation (H)        CURRENT MEDICATIONS:  Current Outpatient Medications   Medication Sig Dispense Refill     Acetaminophen (TYLENOL PO)        Cetirizine HCl (ZYRTEC ALLERGY PO) Take  by mouth daily as needed.       [START ON 10/19/2020] diltiazem ER COATED BEADS (CARDIZEM CD/CARTIA XT) 180 MG 24 hr capsule Take 1 capsule (180 mg) by mouth 2 times daily 90 capsule 3     donepezil (ARICEPT) 10 MG tablet Take 1 tablet (10 mg) by mouth At Bedtime 30 tablet 0     IBUPROFEN PO        levothyroxine (SYNTHROID/LEVOTHROID) 50 MCG tablet Take 1 tablet (50 mcg) by mouth daily 90 tablet 4     memantine (NAMENDA) 5 MG tablet 10 mg   1     pravastatin (PRAVACHOL) 10 MG tablet Take 1 tablet (10 mg) by mouth  daily 90 tablet 4     pravastatin (PRAVACHOL) 20 MG tablet TAKE ONE TABLET BY MOUTH EVERY EVENING. TAKE TOGETHER WITH 10MG 90 tablet 4     Travoprost 0.004 % SOLN Place 1 drop into both eyes At Bedtime Please schedule a follow-up appointment with Dr Joel at [phone]. No further refills until seen 7.5 mL 0     warfarin ANTICOAGULANT (COUMADIN ANTICOAGULANT) 5 MG tablet Take 5 mg by mouth M and Th, and 7.5 mg ROW, or as directed per Coumadin clinic. 135 tablet 4       ALLERGIES     Allergies   Allergen Reactions     Hay Fever & [A.R.M.]      Latex      Seasonal Allergies        PAST MEDICAL HISTORY:  Past Medical History:   Diagnosis Date     Anatomical narrow angle      Benign positional vertigo      Cataracts      Glaucoma     OAG     Hypercholesterolemia      Migraine      Paroxysmal atrial fibrillation (H)        PAST SURGICAL HISTORY:  Past Surgical History:   Procedure Laterality Date     PROSTATE SURGERY       SELECTIVE LASER TRABECULOPLASTY (SLT) OD (RIGHT EYE)  2007    RE       FAMILY HISTORY:  Family History   Problem Relation Age of Onset     Glaucoma Maternal Grandfather      Macular Degeneration No family hx of        SOCIAL HISTORY:  Social History     Socioeconomic History     Marital status:      Spouse name: None     Number of children: None     Years of education: None     Highest education level: None   Occupational History     None   Social Needs     Financial resource strain: None     Food insecurity     Worry: None     Inability: None     Transportation needs     Medical: None     Non-medical: None   Tobacco Use     Smoking status: Former Smoker     Packs/day: 1.00     Years: 28.00     Pack years: 28.00     Types: Cigarettes     Start date:      Last attempt to quit:      Years since quittin.7     Smokeless tobacco: Never Used   Substance and Sexual Activity     Alcohol use: No     Drug use: No     Sexual activity: None   Lifestyle     Physical activity     Days  per week: None     Minutes per session: None     Stress: None   Relationships     Social connections     Talks on phone: None     Gets together: None     Attends Yazidism service: None     Active member of club or organization: None     Attends meetings of clubs or organizations: None     Relationship status: None     Intimate partner violence     Fear of current or ex partner: None     Emotionally abused: None     Physically abused: None     Forced sexual activity: None   Other Topics Concern     Parent/sibling w/ CABG, MI or angioplasty before 65F 55M? Not Asked   Social History Narrative     None       Review of Systems:  Skin:  Negative       Eyes:  Positive for glasses    ENT:  Negative      Respiratory:  Negative       Cardiovascular:  Negative      Gastroenterology: Negative      Genitourinary:  Negative      Musculoskeletal:  Negative      Neurologic:  Negative      Psychiatric:  Negative      Heme/Lymph/Imm:  Negative      Endocrine:  Negative          CC  Pieter Ulloa MD  [address] [place] [id number]

## 2020-09-21 NOTE — LETTER
9/21/2020    Pieter Ulloa MD, MD  [address] [facility] [address]  [facility] [address]    RE: Fredrick Mehta       Dear Colleague,    I had the pleasure of seeing Fredrick Mehta in the [facility].    PHYSICIAN NOTE:  This visit was completed in person at the [facility].      I had the pleasure evaluating Mr. Mehta today.      The patient is a very pleasant 79-year-old retired  with the following medical issues:  1.  Paroxysmal atrial fibrillation.  Previously followed at the [facility].  Managed with rate control and warfarin.  [id number] score of 2.    2.  Dyslipidemia.   3.  Alzheimer's dementia, on donepezil and memantine.   4.  Hypothyroidism.     He has been feeling reasonably well.  He is becoming increasingly forgetful.  His wife accompanied him today.  One recent issue has been his widely fluctuating INR in the past few months.  It caused at least $30 for a cab ride to get his INR checked.    He has had some lightheadedness upon standing.  No syncope or near syncope.  No chest pain with exertion.  Both kids and his wife have been pretty much at home ever since the COVID pandemic started.    PHYSICAL EXAMINATION:  General:  elderly male, in no apparent distress, slender body habitus  ENT/Mouth:  no nasal discharge.  Eyes:  normal conjunctivae.  No jaundice.  Neck:  no thyromegaly or lymphadenopathy.  Chest/Lungs:  patient is not dyspneic.  Lungs CTA, without rales or wheezing.    Cardiovascular: Normal JVP, rate is bradycardic and regular.  No gallop, murmur or rub.    Abdomen:  no abdominal distention.  Negative HJR.    Extremities:  no apparent cyanosis.  Skin:  no xanthelasma.  No facial laceration.  Neurologic:  alert & oriented x 3.  Normal arm motion bilateral, no tremors.    Vascular:  2+ carotids without bruits.  2+ radials.        DIAGNOSTIC STUDIES:  ECG: Sinus bradycardia at 53.  Recent INR  3.9.      IMPRESSION:  1. Paroxysmal atrial fibrillation with RVR.  Resting heart rate is in the low 50s and the patient has had orthostatic lightheadedness.  I will decrease diltiazem CD which is currently at 240 mg twice daily.  2. Fluctuating INR.  It may be worthwhile switching to a DOAC.  Eliquis 5 mg twice daily is reasonable alternative to warfarin.  I asked the patient and his wife to call their pharmacy and find out the cost of Eliquis.  If acceptable, they should call back and we would forward a prescription.  3. Orthostatic lightheadedness.  As above, decrease diltiazem.    RECOMMENDATIONS:  1. Decrease diltiazem CD to 180 mg twice daily.  2. Explore the option of switching warfarin to apixaban.  3. Follow-up with Supriya in 1 year.    It was my pleasure seeing this delightful patient.      Joseluis Moscoso MD, [facility]        Orders Placed This Encounter   Procedures     Follow-Up with Cardiac Advanced Practice Provider     EKG 12-lead complete w/read - Clinics (to be scheduled)       Orders Placed This Encounter   Medications     diltiazem ER COATED BEADS (CARDIZEM CD/CARTIA XT) 180 MG 24 hr capsule     Sig: Take 1 capsule (180 mg) by mouth 2 times daily     Dispense:  90 capsule     Refill:  3       Medications Discontinued During This Encounter   Medication Reason     diltiazem ER COATED BEADS (CARDIZEM CD/CARTIA XT) 240 MG 24 hr capsule Reorder         Encounter Diagnoses   Name Primary?     Paroxysmal atrial fibrillation (H) Yes     Chronic atrial fibrillation (H)        CURRENT MEDICATIONS:  Current Outpatient Medications   Medication Sig Dispense Refill     Acetaminophen (TYLENOL PO)        Cetirizine HCl (ZYRTEC ALLERGY PO) Take  by mouth daily as needed.       [START ON 10/19/2020] diltiazem ER COATED BEADS (CARDIZEM CD/CARTIA XT) 180 MG 24 hr capsule Take 1 capsule (180 mg) by mouth 2 times daily 90 capsule 3     donepezil (ARICEPT) 10 MG tablet Take 1 tablet (10 mg) by mouth At Bedtime 30 tablet 0      IBUPROFEN PO        levothyroxine (SYNTHROID/LEVOTHROID) 50 MCG tablet Take 1 tablet (50 mcg) by mouth daily 90 tablet 4     memantine (NAMENDA) 5 MG tablet 10 mg   1     pravastatin (PRAVACHOL) 10 MG tablet Take 1 tablet (10 mg) by mouth daily 90 tablet 4     pravastatin (PRAVACHOL) 20 MG tablet TAKE ONE TABLET BY MOUTH EVERY EVENING. TAKE TOGETHER WITH 10MG 90 tablet 4     Travoprost 0.004 % SOLN Place 1 drop into both eyes At Bedtime Please schedule a follow-up appointment with Dr Joel at [phone]. No further refills until seen 7.5 mL 0     warfarin ANTICOAGULANT (COUMADIN ANTICOAGULANT) 5 MG tablet Take 5 mg by mouth M and Th, and 7.5 mg ROW, or as directed per Coumadin clinic. 135 tablet 4       ALLERGIES     Allergies   Allergen Reactions     Hay Fever & [A.R.M.]      Latex      Seasonal Allergies        PAST MEDICAL HISTORY:  Past Medical History:   Diagnosis Date     Anatomical narrow angle      Benign positional vertigo      Cataracts      Glaucoma     OAG     Hypercholesterolemia      Migraine      Paroxysmal atrial fibrillation (H)        PAST SURGICAL HISTORY:  Past Surgical History:   Procedure Laterality Date     PROSTATE SURGERY  1999     SELECTIVE LASER TRABECULOPLASTY (SLT) OD (RIGHT EYE)  6/2007    RE       FAMILY HISTORY:  Family History   Problem Relation Age of Onset     Glaucoma Maternal Grandfather      Macular Degeneration No family hx of        SOCIAL HISTORY:  Social History     Socioeconomic History     Marital status:      Spouse name: None     Number of children: None     Years of education: None     Highest education level: None   Occupational History     None   Social Needs     Financial resource strain: None     Food insecurity     Worry: None     Inability: None     Transportation needs     Medical: None     Non-medical: None   Tobacco Use     Smoking status: Former Smoker     Packs/day: 1.00     Years: 28.00     Pack years: 28.00     Types: Cigarettes     Start  date:      Last attempt to quit:      Years since quittin.7     Smokeless tobacco: Never Used   Substance and Sexual Activity     Alcohol use: No     Drug use: No     Sexual activity: None   Lifestyle     Physical activity     Days per week: None     Minutes per session: None     Stress: None   Relationships     Social connections     Talks on phone: None     Gets together: None     Attends Muslim service: None     Active member of club or organization: None     Attends meetings of clubs or organizations: None     Relationship status: None     Intimate partner violence     Fear of current or ex partner: None     Emotionally abused: None     Physically abused: None     Forced sexual activity: None   Other Topics Concern     Parent/sibling w/ CABG, MI or angioplasty before 65F 55M? Not Asked   Social History Narrative     None       Review of Systems:  Skin:  Negative       Eyes:  Positive for glasses    ENT:  Negative      Respiratory:  Negative       Cardiovascular:  Negative      Gastroenterology: Negative      Genitourinary:  Negative      Musculoskeletal:  Negative      Neurologic:  Negative      Psychiatric:  Negative      Heme/Lymph/Imm:  Negative      Endocrine:  Negative          CC  Pieter Ulloa MD  [address] [place] [address]                  Thank you for allowing me to participate in the care of your patient.      Sincerely,     Joseluis Moscoso MD     [facility]    cc:   Pieter Ulloa MD  [address] [place], [place] [id number]

## 2020-09-28 ENCOUNTER — DOCUMENTATION ONLY (OUTPATIENT)
Dept: CARDIOLOGY | Facility: CLINIC | Age: 80
End: 2020-09-28

## 2020-09-28 DIAGNOSIS — I48.0 PAROXYSMAL ATRIAL FIBRILLATION (H): Primary | ICD-10-CM

## 2020-09-28 NOTE — PROGRESS NOTES
Spoke to pt wife about call about changing from Warfarin to Eliquis.  Explained that when changing pt needs to come stop the Warfarin and have an  INR check, which needs to be under 2.0.  Discussed with pt wife that will verify dose with Dr Moscoso, then will send the [facility]. When Eliquis arrives at pt house will have pt/wife call to make [facility] nurse aware that the Eliquis arrived.  Then at that time the Warfarin can be stopped and pt should have an INR scheduled in 2-3 days. Pt wife states understanding and wrote down the phone number to [facility] nurse. After reviewing Dr Moscoso OV note, Eliquis 5 mg twice daily is reasonable alternative to warfarin. Escript will be sent. Marco

## 2020-09-28 NOTE — PROGRESS NOTES
[facility] Moscoso [facility]  [facility] RNs- message left on my phone!    Had a message on my phone from 9/22 from Fredrick Blackwell's wife. They request a change to Eliquis from warfarin as suggested by Dr. Moscoso at 9/21 OV.    They request an Rx for Eliquis sent to [facility].     78 yo with last BMP 8/2020 showing Cr 0.9 and weight 74 kg.  Would anticipate Eliquis 5 mg BID but I have not seen pt.    Pls review change from warfarin to Eliquis per routine.    Dana

## 2020-10-01 ENCOUNTER — ANTICOAGULATION THERAPY VISIT (OUTPATIENT)
Dept: NURSING | Facility: CLINIC | Age: 80
End: 2020-10-01

## 2020-10-01 ENCOUNTER — TELEPHONE (OUTPATIENT)
Dept: FAMILY MEDICINE | Facility: CLINIC | Age: 80
End: 2020-10-01

## 2020-10-01 DIAGNOSIS — I48.91 ATRIAL FIBRILLATION (H): Primary | ICD-10-CM

## 2020-10-01 DIAGNOSIS — Z79.01 LONG TERM CURRENT USE OF ANTICOAGULANT THERAPY: ICD-10-CM

## 2020-10-01 DIAGNOSIS — I48.91 ATRIAL FIBRILLATION (H): ICD-10-CM

## 2020-10-01 LAB — INR PPP: 3 (ref 0.86–1.14)

## 2020-10-01 PROCEDURE — 85610 PROTHROMBIN TIME: CPT | Performed by: FAMILY MEDICINE

## 2020-10-01 PROCEDURE — 36416 COLLJ CAPILLARY BLOOD SPEC: CPT | Performed by: FAMILY MEDICINE

## 2020-10-01 NOTE — PROGRESS NOTES
ANTICOAGULATION MANAGEMENT     Patient Name:  Fredrick Mehta  Date:  10/1/2020    ASSESSMENT /SUBJECTIVE:    Today's INR result of 3.0 is therapeutic. Goal INR of 2.0-3.0      Warfarin dose taken: Warfarin taken as instructed    Diet: No new diet changes affecting INR    Medication changes/ interactions: No new medications/supplements affecting INR    Previous INR: Supratherapeutic     S/S of bleeding or thromboembolism: No    New injury or illness: No    Upcoming surgery, procedure or cardioversion: No    Additional findings: patient will be switching to eliquis once he received the medication from his mail in pharmacy.  Patient wife advised to call the clinic once it is received and can discuss switching him over.          PLAN:    Telephone call with  Rosalva regarding INR result and instructed:     Warfarin Dosing Instructions: Change your warfarin dose to 7.5mg Tues/Thur/Sun and 5mg AOD    Instructed patient to follow up no later than: 2 weeks  Lab visit scheduled    Education provided: Contact the anticoagulation clinic with any changes, questions or concerns at #[phone]       Rosalva verbalizes understanding and agrees to warfarin dosing plan.    Instructed to call the Anticoagulation Clinic for any changes, questions or concerns. (#[phone])        Michelle Crystal RN      OBJECTIVE:  Recent labs: (last 7 days)     10/01/20  1423   INR 3.00*         No question data found.  Anticoagulation Summary  As of 10/1/2020    INR goal:  2.0-3.0   TTR:  52.1 % (1 y)   INR used for dosing:  No new INR was available at the time of this encounter.   Warfarin maintenance plan:  7.5 mg (5 mg x 1.5) every Sun, Tue, Thu; 5 mg (5 mg x 1) all other days   Full warfarin instructions:  7.5 mg every Sun, Tue, Thu; 5 mg all other days   Weekly warfarin total:  42.5 mg   Plan last modified:  Michelle Crystal RN (10/1/2020)   Next INR check:  10/15/2020   Priority:  Maintenance   Target end date:      Indications     Atrial fibrillation (H) [I48.91]  Long-term (current) use of anticoagulants [Z79.01] [Z79.01]             Anticoagulation Episode Summary     INR check location:  Anticoagulation Clinic    Preferred lab:      Send INR reminders to:  VALE CASTILLO    Comments:  Patient having INR drawn through [email or web address] through May 2018. Reach patient [phone].      Anticoagulation Care Providers     Provider Role Specialty Phone number    [facility], Yobani Paniagua MD Referring Cardiology [phone]    Pieter Ulloa MD Responsible [facility] [phone]

## 2020-10-02 ENCOUNTER — TELEPHONE (OUTPATIENT)
Dept: FAMILY MEDICINE | Facility: CLINIC | Age: 80
End: 2020-10-02

## 2020-10-02 NOTE — TELEPHONE ENCOUNTER
Received a phone call from [employer]. The pharmacist needs to verify dosing is correct.  Prescription sent for Eliquis 5 mg BID.    If patient falls into 2 or more of these categories at lower dose of 2.5 mg BID Is recommended:    >80 years ol  <132lbs  > serum creatinine of 1.5    Once a decision is made on the dosing please call the pharmacist back at:    [phone]  Invoice # [phone]    Eliquis was not prescribed or will be managed by the anticoagulation clinic. Routed to prescriber to determine dosing and follow up with the pharmacy.     Lenka Queen RN on 10/2/2020 at 3:19 PM

## 2020-10-05 NOTE — TELEPHONE ENCOUNTER
Joseluis Moscoso MD  [facility] Nurse 2 days ago     5 mg bid is the correct dose...   Can you please call these people?   DI        [facility] to make them aware that per Dr Moscoso 5 mg bid is the correct dose.  Pt is 78 yo, but  Cr is 0.9 and weight is 163 lbs. Medication has been approved and will be sent right away.   Pt wife made aware.  JNelsonRN

## 2020-10-08 ENCOUNTER — TELEPHONE (OUTPATIENT)
Dept: FAMILY MEDICINE | Facility: CLINIC | Age: 80
End: 2020-10-08

## 2020-10-08 NOTE — TELEPHONE ENCOUNTER
Patient's wife called to update [facility] that RX for eliquis was approved. Instructions for switching over per 9/28 encounter:    When Eliquis arrives at pt house will have pt/wife call to make A Fib nurse aware that the Eliquis arrived.  Then at that time the Warfarin can be stopped and pt should have an INR scheduled in 2-3 days.    Per the note okay to start Eliquis when INR is under 2.    Scheduled INR for Mon 10/12.     Wife verbalized understanding and had no other questions.

## 2020-10-12 LAB
CAPILLARY BLOOD COLLECTION: NORMAL
INR PPP: 1.7 (ref 0.86–1.14)

## 2020-10-12 PROCEDURE — 36416 COLLJ CAPILLARY BLOOD SPEC: CPT | Performed by: FAMILY MEDICINE

## 2020-10-12 PROCEDURE — 85610 PROTHROMBIN TIME: CPT | Performed by: FAMILY MEDICINE

## 2020-10-12 NOTE — PROGRESS NOTES
Pt INR today was 1.7.  Pt wife will have pt start taking his Eliquis tonight and 1900. Medication list is correct. JNelsonRN

## 2020-10-13 ENCOUNTER — ANTICOAGULATION THERAPY VISIT (OUTPATIENT)
Dept: NURSING | Facility: CLINIC | Age: 80
End: 2020-10-13

## 2020-10-13 NOTE — PROGRESS NOTES
See 10/8/20 progress note. Pt stopped Warfarin on 10/9/20 after the Eliquis arrived to the house. Pt was approved to start Eliquis once the INR was 2.0 or below per pt's doctor. Pt's INR was 1.7 yesterday 10/12/20. Spoke with pt's wife. She reports that pt started Eliquis last evening 10/12/20. Will resolve the anticoagulation episode. Pt discharged from the [facility] clinic.

## 2020-10-13 NOTE — TELEPHONE ENCOUNTER
Pt discharged from the [facility] clinic. Pt switched to Eliquis on 10/12/20.

## 2020-10-26 ENCOUNTER — TELEPHONE (OUTPATIENT)
Dept: FAMILY MEDICINE | Facility: CLINIC | Age: 80
End: 2020-10-26

## 2020-10-26 NOTE — TELEPHONE ENCOUNTER
Baljinder from [facility] pharmacy is caller, he states he has an active prescription for warfarin and also has a prescription for Eliquis. Chart review shows that warfarin was discontinued on 9/28/20 by Dr. Moscoso staff, and new therapy Eliquis was started by Dr. Moscoso on 9/28/20. If pharmacy has further questions, please contact Dr. Moscoso. All questions answered.     Ana Rosa Hubbard RN  10/26/20  1:06 PM

## 2021-01-29 ENCOUNTER — TELEPHONE (OUTPATIENT)
Dept: CARDIOLOGY | Facility: CLINIC | Age: 81
End: 2021-01-29

## 2021-01-29 DIAGNOSIS — I48.0 PAROXYSMAL ATRIAL FIBRILLATION (H): Primary | ICD-10-CM

## 2021-01-29 RX ORDER — DILTIAZEM HYDROCHLORIDE 180 MG/1
180 CAPSULE, COATED, EXTENDED RELEASE ORAL 2 TIMES DAILY
Qty: 60 CAPSULE | Refills: 1 | Status: SHIPPED | OUTPATIENT
Start: 2021-01-29 | End: 2021-06-22

## 2021-01-29 NOTE — TELEPHONE ENCOUNTER
01/29/21 Pts wife called to say pt is entirely out of Diltiazem  mg BID. She ordered more through [facility] but it will not arrive until another 4-5 days. Pt/ are home bound and have no way to  rx at local pharmacy. Spoke with [facility] pharmacy who stated they do same day home delivery through  service.  Pts wife is in agreement to this. One month supply sent to [facility] pharmacy. Spoke to pharmacy who stated they will contact Rosalva right away to arrange delivery.   Shital 318 pm

## 2021-02-09 ENCOUNTER — IMMUNIZATION (OUTPATIENT)
Dept: NURSING | Facility: CLINIC | Age: 81
End: 2021-02-09
Payer: COMMERCIAL

## 2021-02-09 PROCEDURE — 0001A PR COVID VAC PFIZER DIL RECON 30 MCG/0.3 ML IM: CPT

## 2021-02-09 PROCEDURE — 91300 PR COVID VAC PFIZER DIL RECON 30 MCG/0.3 ML IM: CPT

## 2021-03-02 ENCOUNTER — IMMUNIZATION (OUTPATIENT)
Dept: NURSING | Facility: CLINIC | Age: 81
End: 2021-03-02
Attending: INTERNAL MEDICINE
Payer: COMMERCIAL

## 2021-03-02 PROCEDURE — 91300 PR COVID VAC PFIZER DIL RECON 30 MCG/0.3 ML IM: CPT

## 2021-03-02 PROCEDURE — 0002A PR COVID VAC PFIZER DIL RECON 30 MCG/0.3 ML IM: CPT

## 2021-03-30 ENCOUNTER — TRANSFERRED RECORDS (OUTPATIENT)
Dept: HEALTH INFORMATION MANAGEMENT | Facility: CLINIC | Age: 81
End: 2021-03-30

## 2021-06-22 ENCOUNTER — TELEPHONE (OUTPATIENT)
Dept: CARDIOLOGY | Facility: CLINIC | Age: 81
End: 2021-06-22

## 2021-06-22 DIAGNOSIS — I48.0 PAROXYSMAL ATRIAL FIBRILLATION (H): ICD-10-CM

## 2021-06-22 RX ORDER — DILTIAZEM HYDROCHLORIDE 180 MG/1
180 CAPSULE, COATED, EXTENDED RELEASE ORAL 2 TIMES DAILY
Qty: 180 CAPSULE | Refills: 0 | Status: SHIPPED | OUTPATIENT
Start: 2021-06-22 | End: 2021-09-21

## 2021-06-22 NOTE — TELEPHONE ENCOUNTER
6/22/21 Refill request recd for Diltiazem. Pt due for OV 9/2021. Refill sent for 3 mo supply w no refills.  ConnieoRN 340 pm

## 2021-07-19 ENCOUNTER — TRANSFERRED RECORDS (OUTPATIENT)
Dept: HEALTH INFORMATION MANAGEMENT | Facility: CLINIC | Age: 81
End: 2021-07-19

## 2021-08-31 ENCOUNTER — TELEPHONE (OUTPATIENT)
Dept: CARDIOLOGY | Facility: CLINIC | Age: 81
End: 2021-08-31

## 2021-08-31 DIAGNOSIS — I48.0 PAROXYSMAL ATRIAL FIBRILLATION (H): ICD-10-CM

## 2021-08-31 NOTE — TELEPHONE ENCOUNTER
Sent 90 day refill.  Patient due for annual visit.  Message to scheduling to set this up.  TEMO Cavazos

## 2021-09-17 ENCOUNTER — TELEPHONE (OUTPATIENT)
Dept: FAMILY MEDICINE | Facility: CLINIC | Age: 81
End: 2021-09-17

## 2021-09-17 NOTE — TELEPHONE ENCOUNTER
Who is calling? Rosalva  Reason for Call: Requesting a split visit - PreOP and Physical - currently scheduled for 10/19 for PreOP only

## 2021-09-17 NOTE — TELEPHONE ENCOUNTER
Unsure what you want nurse staff to do with this message. This sounds like a scheduling request, please follow up with patient and perhaps he needs 2 appointments? Scheduling/admin would be best to determine how to manage this.   Ana Rosa Hubbard MS RN-BC  09/17/21  1:25 PM

## 2021-09-21 ENCOUNTER — TELEPHONE (OUTPATIENT)
Dept: CARDIOLOGY | Facility: CLINIC | Age: 81
End: 2021-09-21

## 2021-09-21 DIAGNOSIS — I48.0 PAROXYSMAL ATRIAL FIBRILLATION (H): ICD-10-CM

## 2021-09-21 RX ORDER — DILTIAZEM HYDROCHLORIDE 180 MG/1
180 CAPSULE, COATED, EXTENDED RELEASE ORAL 2 TIMES DAILY
Qty: 180 CAPSULE | Refills: 0 | Status: SHIPPED | OUTPATIENT
Start: 2021-09-21 | End: 2021-10-19

## 2021-09-21 NOTE — TELEPHONE ENCOUNTER
Message left for patient to schedule annual follow up due this month.  [facility] on diltiazem.  Awaiting call from patient.  TEMO Cavazos

## 2021-09-22 NOTE — TELEPHONE ENCOUNTER
9/22/21 Wife returned call and explained pt due for annual follow up. Wife states even though they are fully vaccinated, they are still very nervous about coming to clinic. Explained precautions we are taking her. Scheduled pt for 11/16 at 3:15 with Dr Moscoso.  She voiced understanding and agreement w planVeronica Isaacs 237 pm

## 2021-10-18 NOTE — PROGRESS NOTES
[place] [address] [facility], [address]  [facility] [address]  Phone: [phone]  Fax: [phone]  Primary Provider: Marvel Ulloa  Pre-op Performing Provider: MARVEL ULLOA      PREOPERATIVE EVALUATION:  Today's date: 10/19/2021    Fredrick Mehta is a 80 year old male who presents for a preoperative evaluation.    Surgical Information:  Surgery/Procedure: Cataract surgery  Surgery Location: [facility]  Surgeon: Dr. Radford  Surgery Date: 10/25, 11/8  Time of Surgery: TBD  Where patient plans to recover: At home with family  Fax number for surgical facility: [phone]    Type of Anesthesia Anticipated: to be determined    Assessment & Plan     The proposed surgical procedure is considered LOW risk.    1. Pre-operative evaluation: Fredrick has bilateral cataracts which he has elected to have removed. He has a history of significant dementia primarily impacting his short-term memory, which shouldn't impact surgery.    Risks and Recommendations:  The patient has the following additional risks and recommendations for perioperative complications:   - History of delirium or dementia     Medication Instructions:  Patient is to take all scheduled medications on the day of surgery    RECOMMENDATION:  APPROVAL GIVEN to proceed with proposed procedure, without further diagnostic evaluation.      Subjective     HPI related to upcoming procedure:     Fredrick is accompanied by his wife today, who is helping to answer his questions today given Fredrick's significant dementia. He has bilateral cataracts and will have both removed, starting with the first eye on 10/25/21 and the second eye on 11/8/21. They are unsure which eye will be operated on first. Fredrick has not noticed any significant changes in night vision, but his wife is nodding when asked about Fredrick's vision changes. They have not had a car for a few years, so eyesight while driving is not a factor.    Fredrick  "reports a childhood tonsillectomy and wisdom tooth extraction in addition to his listed surgical history. He denies any previous adverse reactions to anesthesia. Fredrick has switched from coumadin to Eliquis due to his atrial fibrillation. He reports no recent episodes of atrial fibrillation or associated symptoms.     Fredrick and his wife have both received three Pfizer COVID-19 vaccines. They also have both received their influenza vaccines. They have not been contacted by the surgical team regarding pre-operative COVID-19 testing. Fredrick is feeling \"fine\" overall.    Preop Questions 10/19/2021   1. Have you ever had a heart attack or stroke? No   2. Have you ever had surgery on your heart or blood vessels, such as a stent placement, a coronary artery bypass, or surgery on an artery in your head, neck, heart, or legs? No   3. Do you have chest pain with activity? No   4. Do you have a history of  heart failure? No   5. Do you currently have a cold, bronchitis or symptoms of other infection? No   6. Do you have a cough, shortness of breath, or wheezing? No   7. Do you or anyone in your family have previous history of blood clots? No   8. Do you or does anyone in your family have a serious bleeding problem such as prolonged bleeding following surgeries or cuts? No   9. Have you ever had problems with anemia or been told to take iron pills? No   10. Have you had any abnormal blood loss such as black, tarry or bloody stools? No   11. Have you ever had a blood transfusion? No   12. Are you willing to have a blood transfusion if it is medically needed before, during, or after your surgery? Yes   13. Have you or any of your relatives ever had problems with anesthesia? No   14. Do you have sleep apnea, excessive snoring or daytime drowsiness? No   15. Do you have any artifical heart valves or other implanted medical devices like a pacemaker, defibrillator, or continuous glucose monitor? No   16. Do you have artificial joints? " No   17. Are you allergic to latex? YES     Preoperative Review of :   reviewed - no record of controlled substances prescribed.      Review of Systems  CONSTITUTIONAL: NEGATIVE for fever, chills, change in weight  ENT/MOUTH: NEGATIVE for ear, mouth and throat problems  RESP: NEGATIVE for significant cough or SOB  CV: NEGATIVE for chest pain, palpitations or peripheral edema    Patient Active Problem List    Diagnosis Date Noted     Preventative health care 07/30/2013     Priority: High     Medicare annual wellness visit, subsequent 05/06/2019     Priority: Medium     Other urinary incontinence 05/06/2019     Priority: Medium     Primary open angle glaucoma of both eyes, moderate stage 08/02/2017     Priority: Medium     Chronic atrial fibrillation (H) 09/13/2016     Priority: Medium     Acquired hypothyroidism 09/13/2016     Priority: Medium     Memory loss 09/13/2016     Priority: Medium     Long-term (current) use of anticoagulants [Z79.01] 06/07/2016     Priority: Medium     Hypothyroidism 07/30/2013     Priority: Medium     Glaucoma 07/30/2013     Priority: Medium     Imo Update utility       Hypercholesterolemia 02/14/2012     Priority: Medium     Atrial fibrillation (H) 01/11/2012     Priority: Medium      Past Medical History:   Diagnosis Date     Anatomical narrow angle      Benign positional vertigo      Cataracts      Glaucoma     OAG     Hypercholesterolemia      Migraine      Paroxysmal atrial fibrillation (H)      Past Surgical History:   Procedure Laterality Date     PROSTATE SURGERY  1999     SELECTIVE LASER TRABECULOPLASTY (SLT) OD (RIGHT EYE)  6/2007    RE     Current Outpatient Medications   Medication Sig Dispense Refill     Acetaminophen (TYLENOL PO)        apixaban ANTICOAGULANT (ELIQUIS) 5 MG tablet Take 1 tablet (5 mg) by mouth 2 times daily 180 tablet 0     Cetirizine HCl (ZYRTEC ALLERGY PO) Take  by mouth daily as needed.       donepezil (ARICEPT) 10 MG tablet Take 1 tablet (10 mg) by  "mouth At Bedtime 30 tablet 0     IBUPROFEN PO        latanoprost (XALATAN) 0.005 % ophthalmic solution        memantine (NAMENDA) 5 MG tablet 10 mg   1     diltiazem ER COATED BEADS (CARDIZEM CD/CARTIA XT) 180 MG 24 hr capsule Take 1 capsule (180 mg) by mouth 2 times daily 180 capsule 4     levothyroxine (SYNTHROID/LEVOTHROID) 50 MCG tablet Take 1 tablet (50 mcg) by mouth daily 90 tablet 4     pravastatin (PRAVACHOL) 10 MG tablet Take 1 tablet (10 mg) by mouth daily 90 tablet 4     pravastatin (PRAVACHOL) 20 MG tablet TAKE ONE TABLET BY MOUTH EVERY EVENING. TAKE TOGETHER WITH 10MG 90 tablet 4     Allergies   Allergen Reactions     Hay Fever & [A.R.M.]      Latex      Seasonal Allergies       Social History     Tobacco Use     Smoking status: Former Smoker     Packs/day: 1.00     Years: 28.00     Pack years: 28.00     Types: Cigarettes     Start date:      Quit date:      Years since quittin.8     Smokeless tobacco: Never Used   Substance Use Topics     Alcohol use: Yes     Comment: 1 drink per day     Family History   Problem Relation Age of Onset     Glaucoma Maternal Grandfather      Macular Degeneration No family hx of      History   Drug Use No         Objective     /65 (BP Location: Left arm, Patient Position: Sitting, Cuff Size: Adult Regular)   Pulse 66   Temp 97.2  F (36.2  C) (Skin)   Resp 15   Ht 1.797 m (5' 10.75\")   Wt 75.9 kg (167 lb 4 oz)   SpO2 96%   BMI 23.49 kg/m      Physical Exam  GENERAL APPEARANCE: healthy, alert and no distress  HENT: Right ear completely clear, small amount of wax on inferior aspect of left ear. No pain with palpation of the frontal or maxillary sinuses.  Nose and mouth masked.  Neck is supple without adenopathy or thyromegaly.    RESP: lungs clear to auscultation - no rales, rhonchi or wheezes  CV: regular rate and rhythm, normal S1 S2, no S3 or S4 and no murmur, click or rub. No carotid bruits are heard, no JVD.   EXTREMITIES:  Ankles free of any " edema.   SKIN:  Warm and dry.  NEURO: Normal strength and tone, speech normal    Recent Labs   Lab Test 10/12/20  1503 10/01/20  1423 08/12/20  1450 08/12/20  1422   INR 1.70* 3.00*   < >  --    NA  --   --   --  143.0   POTASSIUM  --   --   --  4.2   CR  --   --   --  0.9    < > = values in this interval not displayed.        Diagnostics:  No labs were ordered during this visit.   No EKG required for low risk surgery (cataract, skin procedure, breast biopsy, etc).    Revised Cardiac Risk Index (RCRI):  The patient has the following serious cardiovascular risks for perioperative complications:   - No serious cardiac risks = 0 points     RCRI Interpretation: 0 points: Class I (very low risk - 0.4% complication rate)       Signed Electronically by: Pieter Ulloa MD  Copy of this evaluation report is provided to requesting physician.    I, Dana Knight, am serving as a scribe to document services personally performed by Dr. Pieter Ulloa, based on data collection and the provider's statements to me. Dr. Ulloa has reviewed, edited, and approved the above note.     I, Dr. Pieter Ulloa, have interviewed and examined this patient, and I have thoroughly reviewed and edited this note and agree with its contents.

## 2021-10-19 ENCOUNTER — OFFICE VISIT (OUTPATIENT)
Dept: FAMILY MEDICINE | Facility: CLINIC | Age: 81
End: 2021-10-19
Payer: COMMERCIAL

## 2021-10-19 VITALS
RESPIRATION RATE: 15 BRPM | WEIGHT: 167.25 LBS | SYSTOLIC BLOOD PRESSURE: 105 MMHG | BODY MASS INDEX: 23.41 KG/M2 | HEART RATE: 66 BPM | OXYGEN SATURATION: 96 % | TEMPERATURE: 97.2 F | DIASTOLIC BLOOD PRESSURE: 65 MMHG | HEIGHT: 71 IN

## 2021-10-19 VITALS
BODY MASS INDEX: 23.41 KG/M2 | SYSTOLIC BLOOD PRESSURE: 105 MMHG | WEIGHT: 167.25 LBS | RESPIRATION RATE: 15 BRPM | DIASTOLIC BLOOD PRESSURE: 65 MMHG | TEMPERATURE: 97.2 F | HEIGHT: 71 IN | OXYGEN SATURATION: 96 % | HEART RATE: 66 BPM

## 2021-10-19 DIAGNOSIS — I48.0 PAROXYSMAL ATRIAL FIBRILLATION (H): ICD-10-CM

## 2021-10-19 DIAGNOSIS — Z01.818 PRE-OP EVALUATION: Primary | ICD-10-CM

## 2021-10-19 DIAGNOSIS — Z13.220 SCREENING FOR LIPID DISORDERS: ICD-10-CM

## 2021-10-19 DIAGNOSIS — Z23 NEED FOR VACCINATION: ICD-10-CM

## 2021-10-19 DIAGNOSIS — Z00.00 MEDICARE ANNUAL WELLNESS VISIT, SUBSEQUENT: Primary | ICD-10-CM

## 2021-10-19 DIAGNOSIS — E03.9 HYPOTHYROIDISM, UNSPECIFIED TYPE: ICD-10-CM

## 2021-10-19 DIAGNOSIS — Z12.5 SCREENING FOR PROSTATE CANCER: ICD-10-CM

## 2021-10-19 DIAGNOSIS — E78.2 MIXED HYPERLIPIDEMIA: ICD-10-CM

## 2021-10-19 DIAGNOSIS — R41.3 MEMORY LOSS: ICD-10-CM

## 2021-10-19 DIAGNOSIS — E78.00 HYPERCHOLESTEROLEMIA: ICD-10-CM

## 2021-10-19 LAB
ALT SERPL W P-5'-P-CCNC: 28 U/L (ref 0–70)
ANION GAP SERPL CALCULATED.3IONS-SCNC: 3 MMOL/L (ref 3–14)
BUN SERPL-MCNC: 22 MG/DL (ref 7–30)
CALCIUM SERPL-MCNC: 9.1 MG/DL (ref 8.5–10.1)
CHLORIDE BLD-SCNC: 106 MMOL/L (ref 94–109)
CHOLEST SERPL-MCNC: 175 MG/DL
CO2 SERPL-SCNC: 30 MMOL/L (ref 20–32)
CREAT SERPL-MCNC: 1.29 MG/DL (ref 0.66–1.25)
FASTING STATUS PATIENT QL REPORTED: NORMAL
GFR SERPL CREATININE-BSD FRML MDRD: 52 ML/MIN/1.73M2
GLUCOSE BLD-MCNC: 90 MG/DL (ref 70–99)
HDLC SERPL-MCNC: 61 MG/DL
LDLC SERPL CALC-MCNC: 87 MG/DL
NONHDLC SERPL-MCNC: 114 MG/DL
POTASSIUM BLD-SCNC: 4.7 MMOL/L (ref 3.4–5.3)
PSA SERPL-MCNC: <0.01 UG/L (ref 0–4)
SODIUM SERPL-SCNC: 139 MMOL/L (ref 133–144)
TRIGL SERPL-MCNC: 134 MG/DL

## 2021-10-19 PROCEDURE — 80061 LIPID PANEL: CPT | Performed by: FAMILY MEDICINE

## 2021-10-19 PROCEDURE — G0103 PSA SCREENING: HCPCS | Performed by: FAMILY MEDICINE

## 2021-10-19 PROCEDURE — 80048 BASIC METABOLIC PNL TOTAL CA: CPT | Performed by: FAMILY MEDICINE

## 2021-10-19 PROCEDURE — 84460 ALANINE AMINO (ALT) (SGPT): CPT | Performed by: FAMILY MEDICINE

## 2021-10-19 RX ORDER — PRAVASTATIN SODIUM 10 MG
10 TABLET ORAL DAILY
Qty: 90 TABLET | Refills: 4 | Status: SHIPPED | OUTPATIENT
Start: 2021-10-19 | End: 2023-01-18

## 2021-10-19 RX ORDER — PRAVASTATIN SODIUM 20 MG
TABLET ORAL
Qty: 90 TABLET | Refills: 4 | Status: SHIPPED | OUTPATIENT
Start: 2021-10-19 | End: 2023-01-18

## 2021-10-19 RX ORDER — LATANOPROST 50 UG/ML
1 SOLUTION/ DROPS OPHTHALMIC AT BEDTIME
COMMUNITY
Start: 2021-09-03

## 2021-10-19 RX ORDER — DILTIAZEM HYDROCHLORIDE 180 MG/1
180 CAPSULE, COATED, EXTENDED RELEASE ORAL 2 TIMES DAILY
Qty: 180 CAPSULE | Refills: 4 | Status: SHIPPED | OUTPATIENT
Start: 2021-10-19 | End: 2022-11-10

## 2021-10-19 RX ORDER — LEVOTHYROXINE SODIUM 50 UG/1
50 TABLET ORAL DAILY
Qty: 90 TABLET | Refills: 4 | Status: SHIPPED | OUTPATIENT
Start: 2021-10-19 | End: 2023-01-12

## 2021-10-19 ASSESSMENT — MIFFLIN-ST. JEOR
SCORE: 1481.76
SCORE: 1481.8

## 2021-10-19 NOTE — LETTER
October 20, 2021      Fredrick Mehta  [address] [facility] [address]        Dear Fredrick,     It was a pleasure to see you yesterday.  Here's a copy of your lab results.     First, your PSA (prostate) level remains undetectably low.  This is wonderful to see.     Your lipid/cholesterol panel is fine. Your ALT (liver function) level is normal.     Overall, your metabolic panel looks fine as well.  Your kidney function tests are just slightly off.  This is normal as we age and we'll follow this.     Take care!     iPeter Hamilton   Basic metabolic panel   Result Value Ref Range    Sodium 139 133 - 144 mmol/L    Potassium 4.7 3.4 - 5.3 mmol/L    Chloride 106 94 - 109 mmol/L    Carbon Dioxide (CO2) 30 20 - 32 mmol/L    Anion Gap 3 3 - 14 mmol/L    Urea Nitrogen 22 7 - 30 mg/dL    Creatinine 1.29 (H) 0.66 - 1.25 mg/dL    Calcium 9.1 8.5 - 10.1 mg/dL    Glucose 90 70 - 99 mg/dL    GFR Estimate 52 (L) >60 mL/min/1.73m2      Comment:      As of July 11, 2021, eGFR is calculated by the CKD-EPI creatinine equation, without race adjustment. eGFR can be influenced by muscle mass, exercise, and diet. The reported eGFR is an estimation only and is only applicable if the renal function is stable.   Lipid Profile   Result Value Ref Range    Cholesterol 175 <200 mg/dL    Triglycerides 134 <150 mg/dL    Direct Measure HDL 61 >=40 mg/dL    LDL Cholesterol Calculated 87 <=100 mg/dL    Non HDL Cholesterol 114 <130 mg/dL    Patient Fasting > 8hrs? Unknown     Narrative    Cholesterol  Desirable:  <200 mg/dL    Triglycerides  Normal:  Less than 150 mg/dL  Borderline High:  150-199 mg/dL  High:  200-499 mg/dL  Very High:  Greater than or equal to 500 mg/dL    Direct Measure HDL  Female:  Greater than or equal to 50 mg/dL   Male:  Greater than or equal to 40 mg/dL    LDL Cholesterol  Desirable:  <100mg/dL  Above Desirable:  100-129 mg/dL   Borderline High:  130-159 mg/dL   High:  160-189 mg/dL   Very High:  >= 190  mg/dL    Non HDL Cholesterol  Desirable:  130 mg/dL  Above Desirable:  130-159 mg/dL  Borderline High:  160-189 mg/dL  High:  190-219 mg/dL  Very High:  Greater than or equal to 220 mg/dL   Prostate Specific Antigen Screen   Result Value Ref Range    Prostate Specific Antigen Screen <0.01 0.00 - 4.00 ug/L    Narrative    Assay Method:  Chemiluminescence using Siemens   Vista analyzer.   ALT   Result Value Ref Range    ALT 28 0 - 70 U/L

## 2021-10-19 NOTE — PROGRESS NOTES
"CHIEF COMPLAINT: Medicare Annual Wellness Exam, Subsequent      HISTORY OF PRESENT ILLNESS:  Fredrick Mehta is a 80 year old male who presents for an annual wellness visit. He is accompanied by his wife today. Overall, he is doing well. He wears glasses and eye care is up to date, upcoming bilateral cataract surgeries. He has a history of bilateral glaucoma and uses latanoprost 0.005% ophthalmic solution drops in both eyes. His dental care is up to date. His BP is 105/65 today. Body mass index is 23.49 kg/m . He has gained a little bit of weight since starting [employer]. From an immunization standpoint, he is up to date. Fredrick has received both doses of the Pfizer COVID-19 vaccine, administered on 2/9/21 and 3/2/21, and the booster on 9/26/21. He received his influenza vaccine on 9/26/21. He will be due for the second Shingrix vaccine on 11/28/21.     Fredrick's wife reports that he has had some bright red, fresh blood when wiping after a bowel movement, no blood filling the toilet bowl. They have not noticed any external hemorrhoids but we discussed the possibility of internal hemorrhoids. No melena or dark blood. Fredrick is due for prostate cancer screening, PSA completed today.     Fredrick takes Eliquis 5 mg BID for atrial fibrillation. He also takes diltiazem ER coated beads 180 mg BID. He is tolerating his medication well and has not had any recent episodes of atrial fibrillation or cardiac symptoms.     Fredrick has significant dementia and is taking donepezil 10 mg nightly and memantine 10 mg daily. His wife reports that his short-term memory was \"really bad to begin with\" and is still impaired. He has an excellent recall of his childhood and growing up in [facility].     MEDICARE PREVENTATIVE VISIT:  1. No problems with ADLs  2. Have been very cautious about falls and intentional about removing rugs, etc  3. No depression  4. Fredrick has a history of significant memory loss/dementia, discussed above.  " "Followed by neurology     FAMILY, SOCIAL AND PAST SURGICAL HISTORIES: Updated      MEDICATIONS:   Carefully Reviewed      REVIEW OF SYSTEMS:  10-point review of systems negative unless otherwise stated in the HPI.       OBJECTIVE:    EXAM:    GENERAL: Fredrick is in no distress.  Affect is upbeat.   Alert and oriented x3  /65   Pulse 66   Temp 97.2  F (36.2  C)   Resp 15   Ht 1.797 m (5' 10.75\")   Wt 75.9 kg (167 lb 4 oz)   SpO2 96%   BMI 23.49 kg/m    HEENT:  Head is normocephalic, atraumatic. Right ear completely clear, small amount of wax on inferior aspect of left ear. No pain with palpation of the frontal or maxillary sinuses.  Eyes are grossly normal.  Nose and mouth masked.  Neck is supple without adenopathy or thyromegaly.  No carotid bruits are heard, no JVD.   LUNGS:  Clear to auscultation bilaterally.   HEART:  Regular rate and rhythm without murmur.   EXTREMITIES:  Ankles free of any edema.   SKIN:  Warm and dry.       LABORATORY DATA:   No results found for any visits on 10/19/21.        ASSESSMENT AND PLAN:   1. Annual Wellness Exam: Up to date with all healthcare maintenance strategies. Up to date on all vaccinations. BMP, lipid panel completed today, results pending.   2. Screening for prostate cancer: PSA completed today, results pending.   3. Mixed hyperlipidemia: Fredrick takes a total of 30 mg of pravastatin daily (one 20 mg tablet + one 10 mg tablet). I have refilled these for him today.   4. Hypercholesterolemia: As above. ALT completed today, results pending.   5. Hypothyroidism, unspecified type: Fredrick takes levothyroxine 50 mcg daily. I have refilled this for him today.   6. Paroxysmal atrial fibrillation: Fredrick follows with cardiology for management of his Eliquis 5 mg. I have refilled his diltiazem ER coated beads 180 mg today.   7. Follow up in 1 year or call if there are any questions or concerns.      I, Dana Knight, am serving as a scribe to document services personally " performed by Dr. Pieter Ulloa, based on data collection and the provider's statements to me. Dr. Ulloa has reviewed, edited, and approved the above note.     I, Dr. Pieter Ulloa, have interviewed and examined this patient, and I have thoroughly reviewed and edited this note and agree with its contents. JH

## 2021-10-19 NOTE — NURSING NOTE
"80 year old  Chief Complaint   Patient presents with     Pre-Op Exam     10/25 and  cataract        Blood pressure 105/65, pulse 66, temperature 97.2  F (36.2  C), temperature source Skin, resp. rate 15, height 1.797 m (5' 10.75\"), weight 75.9 kg (167 lb 4 oz), SpO2 96 %. Body mass index is 23.49 kg/m .  Patient Active Problem List   Diagnosis     Atrial fibrillation (H)     Hypercholesterolemia     Preventative health care     Hypothyroidism     Glaucoma     Long-term (current) use of anticoagulants [Z79.01]     Chronic atrial fibrillation (H)     Acquired hypothyroidism     Memory loss     Primary open angle glaucoma of both eyes, moderate stage     Medicare annual wellness visit, subsequent     Other urinary incontinence       Wt Readings from Last 2 Encounters:   10/19/21 75.9 kg (167 lb 4 oz)   20 73.9 kg (163 lb)     BP Readings from Last 3 Encounters:   10/19/21 105/65   20 114/71   20 108/68         Current Outpatient Medications   Medication     Acetaminophen (TYLENOL PO)     apixaban ANTICOAGULANT (ELIQUIS) 5 MG tablet     Cetirizine HCl (ZYRTEC ALLERGY PO)     diltiazem ER COATED BEADS (CARDIZEM CD/CARTIA XT) 180 MG 24 hr capsule     donepezil (ARICEPT) 10 MG tablet     IBUPROFEN PO     latanoprost (XALATAN) 0.005 % ophthalmic solution     levothyroxine (SYNTHROID/LEVOTHROID) 50 MCG tablet     memantine (NAMENDA) 5 MG tablet     pravastatin (PRAVACHOL) 10 MG tablet     pravastatin (PRAVACHOL) 20 MG tablet     Travoprost 0.004 % SOLN     No current facility-administered medications for this visit.       Social History     Tobacco Use     Smoking status: Former Smoker     Packs/day: 1.00     Years: 28.00     Pack years: 28.00     Types: Cigarettes     Start date:      Quit date:      Years since quittin.8     Smokeless tobacco: Never Used   Substance Use Topics     Alcohol use: Yes     Comment: 1 drink per day     Drug use: No       Health Maintenance Due   Topic Date Due "     ADVANCE CARE PLANNING  Never done     MEDICARE ANNUAL WELLNESS VISIT  08/12/2021     FALL RISK ASSESSMENT  08/12/2021       No results found for: PAP      October 19, 2021 2:53 PM

## 2021-10-19 NOTE — NURSING NOTE
"80 year old  Chief Complaint   Patient presents with     Physical       Blood pressure 105/65, pulse 66, temperature 97.2  F (36.2  C), resp. rate 15, height 1.797 m (5' 10.75\"), weight 75.9 kg (167 lb 4 oz), SpO2 96 %. Body mass index is 23.49 kg/m .  Patient Active Problem List   Diagnosis     Atrial fibrillation (H)     Hypercholesterolemia     Preventative health care     Hypothyroidism     Glaucoma     Long-term (current) use of anticoagulants [Z79.01]     Chronic atrial fibrillation (H)     Acquired hypothyroidism     Memory loss     Primary open angle glaucoma of both eyes, moderate stage     Medicare annual wellness visit, subsequent     Other urinary incontinence       Wt Readings from Last 2 Encounters:   10/19/21 75.9 kg (167 lb 4 oz)   10/19/21 75.9 kg (167 lb 4 oz)     BP Readings from Last 3 Encounters:   10/19/21 105/65   10/19/21 105/65   20 114/71         Current Outpatient Medications   Medication     Acetaminophen (TYLENOL PO)     apixaban ANTICOAGULANT (ELIQUIS) 5 MG tablet     Cetirizine HCl (ZYRTEC ALLERGY PO)     diltiazem ER COATED BEADS (CARDIZEM CD/CARTIA XT) 180 MG 24 hr capsule     donepezil (ARICEPT) 10 MG tablet     IBUPROFEN PO     latanoprost (XALATAN) 0.005 % ophthalmic solution     levothyroxine (SYNTHROID/LEVOTHROID) 50 MCG tablet     memantine (NAMENDA) 5 MG tablet     pravastatin (PRAVACHOL) 10 MG tablet     pravastatin (PRAVACHOL) 20 MG tablet     Travoprost 0.004 % SOLN     No current facility-administered medications for this visit.       Social History     Tobacco Use     Smoking status: Former Smoker     Packs/day: 1.00     Years: 28.00     Pack years: 28.00     Types: Cigarettes     Start date:      Quit date:      Years since quittin.8     Smokeless tobacco: Never Used   Substance Use Topics     Alcohol use: Yes     Comment: 1 drink per day     Drug use: No       Health Maintenance Due   Topic Date Due     ADVANCE CARE PLANNING  Never done     MEDICARE " ANNUAL WELLNESS VISIT  08/12/2021     FALL RISK ASSESSMENT  08/12/2021       No results found for: PAP      October 19, 2021 2:54 PM

## 2021-11-16 ENCOUNTER — OFFICE VISIT (OUTPATIENT)
Dept: CARDIOLOGY | Facility: CLINIC | Age: 81
End: 2021-11-16
Attending: INTERNAL MEDICINE
Payer: COMMERCIAL

## 2021-11-16 VITALS
SYSTOLIC BLOOD PRESSURE: 96 MMHG | DIASTOLIC BLOOD PRESSURE: 62 MMHG | HEIGHT: 71 IN | BODY MASS INDEX: 23.94 KG/M2 | WEIGHT: 171 LBS | HEART RATE: 63 BPM

## 2021-11-16 DIAGNOSIS — I48.0 PAROXYSMAL ATRIAL FIBRILLATION (H): Primary | ICD-10-CM

## 2021-11-16 PROCEDURE — 93000 ELECTROCARDIOGRAM COMPLETE: CPT | Performed by: INTERNAL MEDICINE

## 2021-11-16 PROCEDURE — 99214 OFFICE O/P EST MOD 30 MIN: CPT | Performed by: INTERNAL MEDICINE

## 2021-11-16 ASSESSMENT — MIFFLIN-ST. JEOR: SCORE: 1498.77

## 2021-11-16 NOTE — LETTER
"11/16/2021    Pieter Ulloa MD, MD  [address] [place] [id number]    RE: Fredrick Mehta       Dear Colleague,    I had the pleasure of seeing Fredrick Mehta in the [facility].  PHYSICIAN NOTE:  This visit was completed in person at the [facility].       I had the pleasure seeing Mr. Mehta today.       The patient is a very pleasant 80-year-old retired  with the following medical issues:  1.  Paroxysmal atrial fibrillation.  Previously followed at [facility].  Managed with rate control and apixaban.  [id number] score of 2.    2.  Dyslipidemia.   3.  Alzheimer's dementia, on donepezil and memantine.   4.  Hypothyroidism.   5.  Mild orthostatic lightheadedness, chronically low normal BP.    Fredrick has been feeling well.  He came in today accompanied as usual by his wife Rosalva.  She tells me that his memory is doing \"as well as can be expected\".  He continues to take donepezil and memantine for dementia.    Fredrcik has not had awareness of atrial fibrillation events.  He has not had bleeding issues on apixaban.  Both Fredrick and his wife are thankful for switching warfarin to apixaban for its convenience.       He chronically runs low BP.  On occasion he becomes mildly lightheaded upon standing up.  He does not feel he may faint.      PHYSICAL EXAMINATION:  General: Pleasant elderly gentleman in no apparent distress, normal body habitus.  BP 96/62  Eyes:  no jaundice.  Neck:  normal carotid pulses  Chest/Lungs:  patient is not dyspneic.  Lungs CTA, without rales or wheezing.    Cardiovascular: Normal JVP, rate is regular.  No gallop, murmur or rub.    Abdomen:  no abdominal distention.    Extremities:  no edema  Skin:  no xanthelasma.  No facial laceration.  Neurologic:  Alert.  Normal arm motion bilateral, no tremors.    Vascular:  2+ carotids without bruits.  2+ radials.        DIAGNOSTIC " STUDIES:  Laboratory studies: Sodium 139, potassium 4.7, creatinine 1.29, calcium 9.1, cholesterol 175, HDL 61, LDL 87  ECG: Sinus rhythm, leftward axis, otherwise normal.      IMPRESSION:  1. Paroxysmal atrial fibrillation.  No symptoms.  Continue diltiazem and Eliquis.  2. Mild orthostatic hypotension.  We could decrease diltiazem further but at the possible expense of RVR should he develop recurrent atrial fibrillation.  He does not appear to be troubled much by orthostatic lightheadedness.  Therefore, I did not lower the dose of diltiazem.    RECOMMENDATIONS:  1. Follow-up with EP [facility]ARA in 2 years.  We would see him sooner if he has issues in the interim.  2. We will refill his medications as needed.    It was my pleasure seeing this delightful patient.      Joseluis Mocsoso MD, [facility]          Orders Placed This Encounter   Procedures     Follow-Up with Cardiac Advanced Practice Provider     EKG 12-lead complete w/read - Clinics (performed today)       No orders of the defined types were placed in this encounter.      There are no discontinued medications.      Encounter Diagnosis   Name Primary?     Paroxysmal atrial fibrillation (H) Yes       CURRENT MEDICATIONS:  Current Outpatient Medications   Medication Sig Dispense Refill     Acetaminophen (TYLENOL PO)        apixaban ANTICOAGULANT (ELIQUIS) 5 MG tablet Take 1 tablet (5 mg) by mouth 2 times daily 180 tablet 0     Cetirizine HCl (ZYRTEC ALLERGY PO) Take  by mouth daily as needed.       diltiazem ER COATED BEADS (CARDIZEM CD/CARTIA XT) 180 MG 24 hr capsule Take 1 capsule (180 mg) by mouth 2 times daily 180 capsule 4     donepezil (ARICEPT) 10 MG tablet Take 1 tablet (10 mg) by mouth At Bedtime 30 tablet 0     IBUPROFEN PO        latanoprost (XALATAN) 0.005 % ophthalmic solution        levothyroxine (SYNTHROID/LEVOTHROID) 50 MCG tablet Take 1 tablet (50 mcg) by mouth daily 90 tablet 4     memantine (NAMENDA) 5 MG tablet 10 mg   1     pravastatin (PRAVACHOL)  10 MG tablet Take 1 tablet (10 mg) by mouth daily 90 tablet 4     pravastatin (PRAVACHOL) 20 MG tablet TAKE ONE TABLET BY MOUTH EVERY EVENING. TAKE TOGETHER WITH 10MG 90 tablet 4       ALLERGIES     Allergies   Allergen Reactions     Hay Fever & [A.R.M.]      Latex      Seasonal Allergies        PAST MEDICAL HISTORY:  Past Medical History:   Diagnosis Date     Anatomical narrow angle      Benign positional vertigo      Cataracts      Glaucoma     OAG     Hypercholesterolemia      Migraine      Paroxysmal atrial fibrillation (H)        PAST SURGICAL HISTORY:  Past Surgical History:   Procedure Laterality Date     PROSTATE SURGERY       SELECTIVE LASER TRABECULOPLASTY (SLT) OD (RIGHT EYE)  2007    RE       FAMILY HISTORY:  Family History   Problem Relation Age of Onset     Glaucoma Maternal Grandfather      Macular Degeneration No family hx of        SOCIAL HISTORY:  Social History     Socioeconomic History     Marital status:      Spouse name: None     Number of children: None     Years of education: None     Highest education level: None   Occupational History     None   Tobacco Use     Smoking status: Former Smoker     Packs/day: 1.00     Years: 28.00     Pack years: 28.00     Types: Cigarettes     Start date:      Quit date:      Years since quittin.8     Smokeless tobacco: Never Used   Substance and Sexual Activity     Alcohol use: Yes     Comment: occasional     Drug use: No     Sexual activity: None   Other Topics Concern     Parent/sibling w/ CABG, MI or angioplasty before 65F 55M? Not Asked   Social History Narrative     None     Social Determinants of Health     Financial Resource Strain: Not on file   Food Insecurity: Not on file   Transportation Needs: Not on file   Physical Activity: Not on file   Stress: Not on file   Social Connections: Not on file   Intimate Partner Violence: Not on file   Housing Stability: Not on file       Review of Systems:  Skin:  Negative       Eyes:   Positive for glasses    ENT:  Negative      Respiratory:  Negative       Cardiovascular:  Negative for;palpitations;chest pain;edema;lightheadedness      Gastroenterology: Negative      Genitourinary:  Negative      Musculoskeletal:  Negative      Neurologic:  Negative      Psychiatric:  Negative      Heme/Lymph/Imm:  Negative      Endocrine:  Negative          Joseluis Moscoso MD   [facility]

## 2021-11-16 NOTE — PROGRESS NOTES
"PHYSICIAN NOTE:  This visit was completed in person at the [facility].       I had the pleasure seeing Mr. Mehta today.       The patient is a very pleasant 80-year-old retired  with the following medical issues:  1.  Paroxysmal atrial fibrillation.  Previously followed at [facility].  Managed with rate control and apixaban.  [id number] score of 2.    2.  Dyslipidemia.   3.  Alzheimer's dementia, on donepezil and memantine.   4.  Hypothyroidism.   5.  Mild orthostatic lightheadedness, chronically low normal BP.    Fredrick has been feeling well.  He came in today accompanied as usual by his wife Rosalva.  She tells me that his memory is doing \"as well as can be expected\".  He continues to take donepezil and memantine for dementia.    Fredrick has not had awareness of atrial fibrillation events.  He has not had bleeding issues on apixaban.  Both Fredrick and his wife are thankful for switching warfarin to apixaban for its convenience.       He chronically runs low BP.  On occasion he becomes mildly lightheaded upon standing up.  He does not feel he may faint.      PHYSICAL EXAMINATION:  General: Pleasant elderly gentleman in no apparent distress, normal body habitus.  BP 96/62  Eyes:  no jaundice.  Neck:  normal carotid pulses  Chest/Lungs:  patient is not dyspneic.  Lungs CTA, without rales or wheezing.    Cardiovascular: Normal JVP, rate is regular.  No gallop, murmur or rub.    Abdomen:  no abdominal distention.    Extremities:  no edema  Skin:  no xanthelasma.  No facial laceration.  Neurologic:  Alert.  Normal arm motion bilateral, no tremors.    Vascular:  2+ carotids without bruits.  2+ radials.        DIAGNOSTIC STUDIES:  Laboratory studies: Sodium 139, potassium 4.7, creatinine 1.29, calcium 9.1, cholesterol 175, HDL 61, LDL 87  ECG: Sinus rhythm, leftward axis, otherwise normal.      IMPRESSION:  1. Paroxysmal atrial fibrillation.  No symptoms.  Continue diltiazem and " Eliquis.  2. Mild orthostatic hypotension.  We could decrease diltiazem further but at the possible expense of RVR should he develop recurrent atrial fibrillation.  He does not appear to be troubled much by orthostatic lightheadedness.  Therefore, I did not lower the dose of diltiazem.    RECOMMENDATIONS:  1. Follow-up with EP [facility]ARA in 2 years.  We would see him sooner if he has issues in the interim.  2. We will refill his medications as needed.    It was my pleasure seeing this delightful patient.      Joseluis Moscoso MD, [facility]          Orders Placed This Encounter   Procedures     Follow-Up with Cardiac Advanced Practice Provider     EKG 12-lead complete w/read - Clinics (performed today)       No orders of the defined types were placed in this encounter.      There are no discontinued medications.      Encounter Diagnosis   Name Primary?     Paroxysmal atrial fibrillation (H) Yes       CURRENT MEDICATIONS:  Current Outpatient Medications   Medication Sig Dispense Refill     Acetaminophen (TYLENOL PO)        apixaban ANTICOAGULANT (ELIQUIS) 5 MG tablet Take 1 tablet (5 mg) by mouth 2 times daily 180 tablet 0     Cetirizine HCl (ZYRTEC ALLERGY PO) Take  by mouth daily as needed.       diltiazem ER COATED BEADS (CARDIZEM CD/CARTIA XT) 180 MG 24 hr capsule Take 1 capsule (180 mg) by mouth 2 times daily 180 capsule 4     donepezil (ARICEPT) 10 MG tablet Take 1 tablet (10 mg) by mouth At Bedtime 30 tablet 0     IBUPROFEN PO        latanoprost (XALATAN) 0.005 % ophthalmic solution        levothyroxine (SYNTHROID/LEVOTHROID) 50 MCG tablet Take 1 tablet (50 mcg) by mouth daily 90 tablet 4     memantine (NAMENDA) 5 MG tablet 10 mg   1     pravastatin (PRAVACHOL) 10 MG tablet Take 1 tablet (10 mg) by mouth daily 90 tablet 4     pravastatin (PRAVACHOL) 20 MG tablet TAKE ONE TABLET BY MOUTH EVERY EVENING. TAKE TOGETHER WITH 10MG 90 tablet 4       ALLERGIES     Allergies   Allergen Reactions     Hay Fever & [A.R.M.]       Latex      Seasonal Allergies        PAST MEDICAL HISTORY:  Past Medical History:   Diagnosis Date     Anatomical narrow angle      Benign positional vertigo      Cataracts      Glaucoma     OAG     Hypercholesterolemia      Migraine      Paroxysmal atrial fibrillation (H)        PAST SURGICAL HISTORY:  Past Surgical History:   Procedure Laterality Date     PROSTATE SURGERY       SELECTIVE LASER TRABECULOPLASTY (SLT) OD (RIGHT EYE)  2007    RE       FAMILY HISTORY:  Family History   Problem Relation Age of Onset     Glaucoma Maternal Grandfather      Macular Degeneration No family hx of        SOCIAL HISTORY:  Social History     Socioeconomic History     Marital status:      Spouse name: None     Number of children: None     Years of education: None     Highest education level: None   Occupational History     None   Tobacco Use     Smoking status: Former Smoker     Packs/day: 1.00     Years: 28.00     Pack years: 28.00     Types: Cigarettes     Start date:      Quit date:      Years since quittin.8     Smokeless tobacco: Never Used   Substance and Sexual Activity     Alcohol use: Yes     Comment: occasional     Drug use: No     Sexual activity: None   Other Topics Concern     Parent/sibling w/ CABG, MI or angioplasty before 65F 55M? Not Asked   Social History Narrative     None     Social Determinants of Health     Financial Resource Strain: Not on file   Food Insecurity: Not on file   Transportation Needs: Not on file   Physical Activity: Not on file   Stress: Not on file   Social Connections: Not on file   Intimate Partner Violence: Not on file   Housing Stability: Not on file       Review of Systems:  Skin:  Negative       Eyes:  Positive for glasses    ENT:  Negative      Respiratory:  Negative       Cardiovascular:  Negative for;palpitations;chest pain;edema;lightheadedness      Gastroenterology: Negative      Genitourinary:  Negative      Musculoskeletal:  Negative      Neurologic:   Negative      Psychiatric:  Negative      Heme/Lymph/Imm:  Negative      Endocrine:  Negative

## 2021-11-22 ENCOUNTER — TRANSFERRED RECORDS (OUTPATIENT)
Dept: HEALTH INFORMATION MANAGEMENT | Facility: CLINIC | Age: 81
End: 2021-11-22
Payer: COMMERCIAL

## 2021-12-02 ENCOUNTER — TELEPHONE (OUTPATIENT)
Dept: CARDIOLOGY | Facility: CLINIC | Age: 81
End: 2021-12-02
Payer: COMMERCIAL

## 2021-12-02 DIAGNOSIS — I48.0 PAROXYSMAL ATRIAL FIBRILLATION (H): ICD-10-CM

## 2021-12-02 NOTE — TELEPHONE ENCOUNTER
21 Received refill request for: Eliquis 5 mg BID  Last OV was: 21  Labs/EK21  F/U scheduled : ordered for 2023  Rx sent to: [facility] am

## 2022-07-16 ENCOUNTER — APPOINTMENT (OUTPATIENT)
Dept: GENERAL RADIOLOGY | Facility: CLINIC | Age: 82
End: 2022-07-16
Attending: EMERGENCY MEDICINE
Payer: COMMERCIAL

## 2022-07-16 ENCOUNTER — HOSPITAL ENCOUNTER (EMERGENCY)
Facility: CLINIC | Age: 82
Discharge: HOME | End: 2022-07-16
Attending: EMERGENCY MEDICINE | Admitting: EMERGENCY MEDICINE
Payer: COMMERCIAL

## 2022-07-16 VITALS
HEIGHT: 71 IN | RESPIRATION RATE: 16 BRPM | WEIGHT: 170 LBS | TEMPERATURE: 102.4 F | SYSTOLIC BLOOD PRESSURE: 99 MMHG | OXYGEN SATURATION: 95 % | BODY MASS INDEX: 23.8 KG/M2 | HEART RATE: 105 BPM | DIASTOLIC BLOOD PRESSURE: 67 MMHG

## 2022-07-16 DIAGNOSIS — U07.1 INFECTION DUE TO 2019 NOVEL CORONAVIRUS: ICD-10-CM

## 2022-07-16 LAB
ALBUMIN SERPL-MCNC: 3.4 G/DL (ref 3.4–5)
ALP SERPL-CCNC: 73 U/L (ref 40–150)
ALT SERPL W P-5'-P-CCNC: 42 U/L (ref 0–70)
ANION GAP SERPL CALCULATED.3IONS-SCNC: 8 MMOL/L (ref 3–14)
AST SERPL W P-5'-P-CCNC: 40 U/L (ref 0–45)
BASOPHILS # BLD AUTO: 0 10E3/UL (ref 0–0.2)
BASOPHILS NFR BLD AUTO: 0 %
BILIRUB SERPL-MCNC: 0.5 MG/DL (ref 0.2–1.3)
BUN SERPL-MCNC: 25 MG/DL (ref 7–30)
CALCIUM SERPL-MCNC: 8.5 MG/DL (ref 8.5–10.1)
CHLORIDE BLD-SCNC: 107 MMOL/L (ref 94–109)
CO2 SERPL-SCNC: 25 MMOL/L (ref 20–32)
CREAT SERPL-MCNC: 1.35 MG/DL (ref 0.66–1.25)
EOSINOPHIL # BLD AUTO: 0 10E3/UL (ref 0–0.7)
EOSINOPHIL NFR BLD AUTO: 0 %
ERYTHROCYTE [DISTWIDTH] IN BLOOD BY AUTOMATED COUNT: 13.2 % (ref 10–15)
FLUAV RNA SPEC QL NAA+PROBE: NEGATIVE
FLUBV RNA RESP QL NAA+PROBE: NEGATIVE
GFR SERPL CREATININE-BSD FRML MDRD: 52 ML/MIN/1.73M2
GLUCOSE BLD-MCNC: 122 MG/DL (ref 70–99)
HCT VFR BLD AUTO: 40.2 % (ref 40–53)
HGB BLD-MCNC: 13.1 G/DL (ref 13.3–17.7)
IMM GRANULOCYTES # BLD: 0 10E3/UL
IMM GRANULOCYTES NFR BLD: 0 %
LYMPHOCYTES # BLD AUTO: 0.7 10E3/UL (ref 0.8–5.3)
LYMPHOCYTES NFR BLD AUTO: 10 %
MCH RBC QN AUTO: 30.7 PG (ref 26.5–33)
MCHC RBC AUTO-ENTMCNC: 32.6 G/DL (ref 31.5–36.5)
MCV RBC AUTO: 94 FL (ref 78–100)
MONOCYTES # BLD AUTO: 0.7 10E3/UL (ref 0–1.3)
MONOCYTES NFR BLD AUTO: 10 %
NEUTROPHILS # BLD AUTO: 5.4 10E3/UL (ref 1.6–8.3)
NEUTROPHILS NFR BLD AUTO: 80 %
NRBC # BLD AUTO: 0 10E3/UL
NRBC BLD AUTO-RTO: 0 /100
PLATELET # BLD AUTO: 163 10E3/UL (ref 150–450)
POTASSIUM BLD-SCNC: 4.1 MMOL/L (ref 3.4–5.3)
PROT SERPL-MCNC: 6.8 G/DL (ref 6.8–8.8)
RBC # BLD AUTO: 4.27 10E6/UL (ref 4.4–5.9)
RSV RNA SPEC NAA+PROBE: NEGATIVE
SARS-COV-2 RNA RESP QL NAA+PROBE: POSITIVE
SODIUM SERPL-SCNC: 140 MMOL/L (ref 133–144)
WBC # BLD AUTO: 6.9 10E3/UL (ref 4–11)

## 2022-07-16 PROCEDURE — 96360 HYDRATION IV INFUSION INIT: CPT

## 2022-07-16 PROCEDURE — 258N000003 HC RX IP 258 OP 636: Performed by: EMERGENCY MEDICINE

## 2022-07-16 PROCEDURE — 85025 COMPLETE CBC W/AUTO DIFF WBC: CPT | Performed by: EMERGENCY MEDICINE

## 2022-07-16 PROCEDURE — 80053 COMPREHEN METABOLIC PANEL: CPT | Performed by: EMERGENCY MEDICINE

## 2022-07-16 PROCEDURE — 36415 COLL VENOUS BLD VENIPUNCTURE: CPT | Performed by: EMERGENCY MEDICINE

## 2022-07-16 PROCEDURE — 96361 HYDRATE IV INFUSION ADD-ON: CPT

## 2022-07-16 PROCEDURE — 87637 SARSCOV2&INF A&B&RSV AMP PRB: CPT | Performed by: EMERGENCY MEDICINE

## 2022-07-16 PROCEDURE — 99284 EMERGENCY DEPT VISIT MOD MDM: CPT | Mod: 25

## 2022-07-16 PROCEDURE — 87040 BLOOD CULTURE FOR BACTERIA: CPT | Performed by: EMERGENCY MEDICINE

## 2022-07-16 PROCEDURE — C9803 HOPD COVID-19 SPEC COLLECT: HCPCS

## 2022-07-16 PROCEDURE — 250N000013 HC RX MED GY IP 250 OP 250 PS 637: Performed by: EMERGENCY MEDICINE

## 2022-07-16 PROCEDURE — 71045 X-RAY EXAM CHEST 1 VIEW: CPT

## 2022-07-16 RX ORDER — CODEINE PHOSPHATE AND GUAIFENESIN 10; 100 MG/5ML; MG/5ML
1 SOLUTION ORAL EVERY 4 HOURS PRN
Qty: 50 ML | Refills: 0 | Status: SHIPPED | OUTPATIENT
Start: 2022-07-16 | End: 2023-08-01

## 2022-07-16 RX ORDER — ACETAMINOPHEN 325 MG/1
650 TABLET ORAL ONCE
Status: COMPLETED | OUTPATIENT
Start: 2022-07-16 | End: 2022-07-16

## 2022-07-16 RX ORDER — CODEINE PHOSPHATE AND GUAIFENESIN 10; 100 MG/5ML; MG/5ML
5 SOLUTION ORAL ONCE
Status: COMPLETED | OUTPATIENT
Start: 2022-07-16 | End: 2022-07-16

## 2022-07-16 RX ADMIN — SODIUM CHLORIDE, POTASSIUM CHLORIDE, SODIUM LACTATE AND CALCIUM CHLORIDE 1000 ML: 600; 310; 30; 20 INJECTION, SOLUTION INTRAVENOUS at 20:58

## 2022-07-16 RX ADMIN — GUAIFENESIN AND CODEINE PHOSPHATE 5 ML: 100; 10 SOLUTION ORAL at 23:16

## 2022-07-16 RX ADMIN — ACETAMINOPHEN 650 MG: 325 TABLET ORAL at 20:57

## 2022-07-17 NOTE — ED TRIAGE NOTES
Cold symptoms with cough and headache since yesterday and been getting worse.Having fever in Triage.

## 2022-07-17 NOTE — DISCHARGE INSTRUCTIONS
*Tylenol as directed as needed for fever.  *Follow-up your physician in the next 2-3 days.    *Molnupiravir as directed for treatment of COVID. Cough syrup as directed as needed for cough.  *Return if you develop heart rate above 120, pulse oximetry reading below 92%, worsening shortness of breath, faint or feel like you will faint or become worse in any way.

## 2022-07-17 NOTE — ED PROVIDER NOTES
"  History   Chief Complaint:  Fever and Cough  2 months  HPI   Fredrick Mehta is a 81 year old male with history of atrial fibrillation and dementia who presents with his caregiver, his wife, for concern of fever and cough.  He developed symptoms of a cough yesterday and coughed through the night.  They did not realize he had a fever but he was febrile upon arrival.  He has had an intermittent headache.  No neck stiffness.  He denies any sore throat, difficulty breathing, nausea, vomiting, diarrhea, loss of taste or smell.  No skin redness.  No urinary symptoms.  He is vaccinated against COVID x3.    Review of Systems  As noted per HPI.  Remainder of a 10 point review of systems was negative.    Allergies:  Hay Fever & [A.R.M.]  Latex  Seasonal Allergies    Medications:  Eliquis  Zyrtec  Cardizem  Aricept  Ibuprofen  Synthroid  Namenda  Pravachol    Past Medical History:     Atrial fibrillation  Hypercholesterolemia  Hypothyroidism  Glaucoma  Memory loss  Urinary incontinence  Benign positional vertigo  Migraine  Cataracts    Past Surgical History:    Prostate surgery  Selective laser trabeculoplasty, right    Social History:  The patient presents to the ED with his spouse.    Physical Exam     Patient Vitals for the past 24 hrs:   BP Temp Temp src Pulse Resp SpO2 Height Weight   07/16/22 2130 99/67 -- -- 105 -- -- -- --   07/16/22 2100 94/69 -- -- 75 -- 95 % -- --   07/16/22 1943 135/63 (!) 102.4  F (39.1  C) Oral 81 16 94 % 1.803 m (5' 11\") 77.1 kg (170 lb)       Physical Exam  General: Well-nourished,feels warm to touch  Eyes: PERRL, conjunctivae pink no scleral icterus or conjunctival injection  ENT:  Moist mucus membranes, posterior oropharynx clear without erythema or exudates  Respiratory:  Lungs clear to auscultation bilaterally, no crackles/rubs/wheezes.  Good air movement. +cough  CV: Normal rate and rhythm, no murmurs/rubs/gallops  GI:  Abdomen soft and non-distended.  Normoactive BS.  No tenderness, " guarding or rebound  Skin: Warm, dry.  No rashes or petechiae  Musculoskeletal: No peripheral edema or calf tenderness  Neuro: Alert and oriented to person/place  Psychiatric: Normal affect    Emergency Department Course     Imaging:  XR Chest Port 1 View   Final Result   IMPRESSION: Heart size and pulmonary vascularity normal. Elevated right hemidiaphragm. Lungs otherwise clear.        Report per radiology    Laboratory:  Labs Ordered and Resulted from Time of ED Arrival to Time of ED Departure   INFLUENZA A/B & SARS-COV2 PCR MULTIPLEX - Abnormal       Result Value    Influenza A PCR Negative      Influenza B PCR Negative      RSV PCR Negative      SARS CoV2 PCR Positive (*)    COMPREHENSIVE METABOLIC PANEL - Abnormal    Sodium 140      Potassium 4.1      Chloride 107      Carbon Dioxide (CO2) 25      Anion Gap 8      Urea Nitrogen 25      Creatinine 1.35 (*)     Calcium 8.5      Glucose 122 (*)     Alkaline Phosphatase 73      AST 40      ALT 42      Protein Total 6.8      Albumin 3.4      Bilirubin Total 0.5      GFR Estimate 52 (*)    CBC WITH PLATELETS AND DIFFERENTIAL - Abnormal    WBC Count 6.9      RBC Count 4.27 (*)     Hemoglobin 13.1 (*)     Hematocrit 40.2      MCV 94      MCH 30.7      MCHC 32.6      RDW 13.2      Platelet Count 163      % Neutrophils 80      % Lymphocytes 10      % Monocytes 10      % Eosinophils 0      % Basophils 0      % Immature Granulocytes 0      NRBCs per 100 WBC 0      Absolute Neutrophils 5.4      Absolute Lymphocytes 0.7 (*)     Absolute Monocytes 0.7      Absolute Eosinophils 0.0      Absolute Basophils 0.0      Absolute Immature Granulocytes 0.0      Absolute NRBCs 0.0     ROUTINE UA WITH MICROSCOPIC   BLOOD CULTURE   BLOOD CULTURE        Emergency Department Course:       Reviewed:  I reviewed nursing notes, vitals and past medical history    Assessments:  2010 I obtained history and examined the patient as noted above.   2050 I rechecked and updated the patient.   2256 I  rechecked the patient and explained findings.     Interventions:  Medications   sodium chloride (PF) 0.9% PF flush 3 mL (has no administration in time range)   sodium chloride (PF) 0.9% PF flush 3 mL (3 mLs Intracatheter Given 7/16/22 2058)   lactated ringers BOLUS 1,000 mL (0 mLs Intravenous Stopped 7/16/22 2310)   acetaminophen (TYLENOL) tablet 650 mg (650 mg Oral Given 7/16/22 2057)   guaiFENesin-codeine (ROBITUSSIN AC) 100-10 MG/5ML solution 5 mL (5 mLs Oral Given 7/16/22 2316)       Disposition:  The patient was discharged to home.     Impression & Plan     Medical Decision Making:  Fredrick Mehta is a 81 year old male who comes today with symptoms that are concerning for possible COVID19.  He is febrile.  Fortunately he is not hypoxic nor having increased work of breathing.  Singly he is vaccinated x3.  Septic work-up was undertaken and lactic acid is normal, white count is normal and no other signs of acute endorgan dysfunction.  Mentation is at his baseline.  The patient does not require hospitalization.  At this time, I believe the patient is safe for discharge home.  He does criteria for treatment with COVID therapeutics.  I consulted our clinical pharmacist who reviewed all of his home medications.  In order to take Paxlovid, 4 of his home medications would require dose adjustments or changes.  I reviewed this with the patient and his wife and they are uncomfortable making these medication adjustments and changes.  Discussed other options.  They elected for a prescription for Molnupiravir given the lack of significant drug interactions.  I did recommend that the call and follow-up with her own doctor on Monday.  He would still be within the treatment window for monoclonal antibodies.  I checked through the state and then wrapped website and I am no longer able to order or refer him for monoclonal infusion via this website.  The patient's wife states that they also want something for cough and after  discussion the requested posterior.  He was given the first dose in the emergency department as they have trouble with transportation unable to stop the pharmacy on the way home. The patient was given the current [place] Department of Health guidelines on self isolation.  They are asked to follow-up via telemetry medicine.  They are asked to drink plenty of fluids.  They are asked to return if they develop severe difficulty in breathing or worsening requiring hospitalization.    Diagnosis:    ICD-10-CM    1. Infection due to 2019 novel coronavirus  U07.1        Discharge Medications:  Discharge Medication List as of 7/16/2022 11:10 PM      START taking these medications    Details   guaiFENesin-codeine (ROBITUSSIN AC) 100-10 MG/5ML solution Take 5 mLs by mouth every 4 hours as needed for cough, Disp-50 mL, R-0, Local Print      molnupiravir (LAGEVRIO) 200 MG capsule Take 4 capsules (800 mg) by mouth every 12 hours for 5 days, Disp-40 each, R-0, Local PrintIf ordered alone, patient wants only Molnupiravir. If ordered with Paxlovid, patient doesn't want MNRAP monoclonal antibody referral and wants this if Paxlovid no t available. [place] [facility]' patient on embryotoxicity.             Scribe Disclosure:  I, Mary Bell, am serving as a scribe at 8:10 PM on 7/16/2022 to document services personally performed by Supriya Pineda MD based on my observations and the provider's statements to me.              Supriya Pineda MD  07/17/22 0041

## 2022-07-22 LAB — BACTERIA BLD CULT: NO GROWTH

## 2022-11-09 DIAGNOSIS — I48.0 PAROXYSMAL ATRIAL FIBRILLATION (H): ICD-10-CM

## 2022-11-10 RX ORDER — DILTIAZEM HYDROCHLORIDE 180 MG/1
180 CAPSULE, COATED, EXTENDED RELEASE ORAL 2 TIMES DAILY
Qty: 180 CAPSULE | Refills: 0 | Status: SHIPPED | OUTPATIENT
Start: 2022-11-10 | End: 2023-02-15

## 2022-11-10 NOTE — TELEPHONE ENCOUNTER
Last visit 10/19/21, no future visit  Medication is being filled for 1 time refill only due to:  Patient needs to be seen because it has been more than one year since last visit.   Meche Tse RN  [facility]

## 2022-11-25 ENCOUNTER — TELEPHONE (OUTPATIENT)
Dept: CARDIOLOGY | Facility: CLINIC | Age: 82
End: 2022-11-25

## 2022-11-25 DIAGNOSIS — I48.0 PAROXYSMAL ATRIAL FIBRILLATION (H): ICD-10-CM

## 2022-11-25 NOTE — TELEPHONE ENCOUNTER
Sent refill request for eliquis.  Per OV note 11/16/2021 patient to see Dr Moscoso every 2 years. TEMO Cavazos

## 2022-12-27 ENCOUNTER — TRANSFERRED RECORDS (OUTPATIENT)
Dept: HEALTH INFORMATION MANAGEMENT | Facility: CLINIC | Age: 82
End: 2022-12-27

## 2023-01-10 ENCOUNTER — OFFICE VISIT (OUTPATIENT)
Dept: FAMILY MEDICINE | Facility: CLINIC | Age: 83
End: 2023-01-10
Payer: COMMERCIAL

## 2023-01-10 VITALS
BODY MASS INDEX: 24.34 KG/M2 | WEIGHT: 170 LBS | RESPIRATION RATE: 15 BRPM | DIASTOLIC BLOOD PRESSURE: 76 MMHG | SYSTOLIC BLOOD PRESSURE: 119 MMHG | OXYGEN SATURATION: 98 % | TEMPERATURE: 97.8 F | HEART RATE: 74 BPM | HEIGHT: 70 IN

## 2023-01-10 DIAGNOSIS — E03.9 ACQUIRED HYPOTHYROIDISM: ICD-10-CM

## 2023-01-10 DIAGNOSIS — Z13.1 SCREENING FOR DIABETES MELLITUS: ICD-10-CM

## 2023-01-10 DIAGNOSIS — E78.00 HYPERCHOLESTEROLEMIA: ICD-10-CM

## 2023-01-10 DIAGNOSIS — R53.83 OTHER FATIGUE: Primary | ICD-10-CM

## 2023-01-10 PROCEDURE — 80048 BASIC METABOLIC PNL TOTAL CA: CPT | Performed by: FAMILY MEDICINE

## 2023-01-10 PROCEDURE — 84443 ASSAY THYROID STIM HORMONE: CPT | Performed by: FAMILY MEDICINE

## 2023-01-10 PROCEDURE — 82306 VITAMIN D 25 HYDROXY: CPT | Performed by: FAMILY MEDICINE

## 2023-01-10 PROCEDURE — 80061 LIPID PANEL: CPT | Performed by: FAMILY MEDICINE

## 2023-01-10 PROCEDURE — 84460 ALANINE AMINO (ALT) (SGPT): CPT | Performed by: FAMILY MEDICINE

## 2023-01-10 NOTE — NURSING NOTE
"82 year old  Chief Complaint   Patient presents with     Physical     Wellness visit,        Blood pressure 119/76, pulse 74, temperature 97.8  F (36.6  C), temperature source Skin, resp. rate 15, height 1.778 m (5' 10\"), weight 77.1 kg (170 lb), SpO2 98 %. Body mass index is 24.39 kg/m .  Patient Active Problem List   Diagnosis     Atrial fibrillation (H)     Hypercholesterolemia     Hypothyroidism     Glaucoma     Long-term (current) use of anticoagulants [Z79.01]     Chronic atrial fibrillation (H)     Acquired hypothyroidism     Memory loss     Primary open angle glaucoma of both eyes, moderate stage     Medicare annual wellness visit, subsequent     Other urinary incontinence       Wt Readings from Last 2 Encounters:   01/10/23 77.1 kg (170 lb)   22 77.1 kg (170 lb)     BP Readings from Last 3 Encounters:   01/10/23 119/76   22 99/67   21 96/62         Current Outpatient Medications   Medication     Acetaminophen (TYLENOL PO)     apixaban ANTICOAGULANT (ELIQUIS) 5 MG tablet     Cetirizine HCl (ZYRTEC ALLERGY PO)     diltiazem ER COATED BEADS (CARDIZEM CD/CARTIA XT) 180 MG 24 hr capsule     donepezil (ARICEPT) 10 MG tablet     guaiFENesin-codeine (ROBITUSSIN AC) 100-10 MG/5ML solution     IBUPROFEN PO     latanoprost (XALATAN) 0.005 % ophthalmic solution     levothyroxine (SYNTHROID/LEVOTHROID) 50 MCG tablet     memantine (NAMENDA) 5 MG tablet     pravastatin (PRAVACHOL) 10 MG tablet     pravastatin (PRAVACHOL) 20 MG tablet     No current facility-administered medications for this visit.       Social History     Tobacco Use     Smoking status: Former     Packs/day: 1.00     Years: 28.00     Pack years: 28.00     Types: Cigarettes     Start date:      Quit date:      Years since quittin.0     Smokeless tobacco: Never   Substance Use Topics     Alcohol use: Yes     Comment: 1 drink of wine per day     Drug use: No       Health Maintenance Due   Topic Date Due     ADVANCE CARE " PLANNING  Never done     MEDICARE ANNUAL WELLNESS VISIT  10/19/2022       No results found for: PAP      January 10, 2023 3:39 PM

## 2023-01-10 NOTE — PROGRESS NOTES
"CHIEF COMPLAINT: Medicare Annual Wellness Exam, Subsequent      HISTORY OF PRESENT ILLNESS:  Fredrick Mehta is a 82 year old male who presents for an annual wellness visit.  Overall, he is doing well. He does not wear glasses and eye care is up to date. His wife reports that he hears well at times, but not at others. His dental care is up to date. His BP is 119/76 today. Body mass index is 24.39 kg/m . From an immunization standpoint, he is up to date.      He had colon cancer screening on 7/26/2010, now discontinued due to age. He is due for prostate cancer screening.      Fredrick's wife notes that he has been significantly more fatigued than usual this weekend. She notes that this coincided with worsening weather that restricts their exercise as they are unable to go on walks outside. He is reluctant to walk outside in rain or otherwise \"bad\" weather. She states that their current exercise is comprised of going up and down the stairs in their home, and only as needed. Fredrick does not currently feel the desire to nap. Fredrick does not recall feeling especially fatigued or short of breath after climbing their stairs, but his wife reports that he complained of this just this morning. His wife reports that he will sit in his chair and sleep for hours at a time. Of note, he has a history of hypothyroidism and is currently taking levothyroxine 50 mcg daily.    Fredrick has significant dementia and is taking donepezil 10 mg nightly and memantine 10 mg daily.     Fredrick has a history of hypercholesterolemia and is currently taking pravastatin 30 mg daily.    MEDICARE PREVENTATIVE VISIT:  1. No problems with ADLs, no assistive devices.  2. No falls, his wife reports stumbling, swaying, and dizziness, but no falls as he has \"amazing recovery.\"  3. No depression  4. Significant dementia, see above.     FAMILY, SOCIAL AND PAST SURGICAL HISTORIES: Updated      MEDICATIONS:   Carefully Reviewed      REVIEW OF SYSTEMS:  10-point review of " Goal Outcome Evaluation:  Plan of Care Reviewed With: patient  Progress: improving  Outcome Summary: pt reports ''alittle pain'', in bed wearing walking boot. sba/mod with bed mob and transfers. gt x >400' pushing iv pole sba and stairs 4-6 with breonna hr sba. overall doing well. very pleasant.   "systems negative unless otherwise stated in the HPI.       OBJECTIVE:    EXAM:    GENERAL: He is in no distress.  Affect is very upbeat, with lots of laughing and joking.   Alert and oriented x3  /76 (BP Location: Right arm, Patient Position: Sitting, Cuff Size: Adult Regular)   Pulse 74   Temp 97.8  F (36.6  C) (Skin)   Resp 15   Ht 1.778 m (5' 10\")   Wt 77.1 kg (170 lb)   SpO2 98%   BMI 24.39 kg/m    HEENT:  Head is normocephalic, atraumatic. Some cerumen bilaterally, left > right. TMs clear bilaterally. Eyes are grossly normal.  Nose and mouth are masked.  Neck is supple without adenopathy or thyromegaly.  No carotid bruits are heard, no JVD. Hearing is significantly diminished. Unable to hear finger rub at close proximity, bilaterally.  BACK:  Smooth and straight.  No pain to percussion.  No CVA tenderness.   LUNGS:  Clear to auscultation bilaterally.   HEART:  Regular rate and rhythm without murmur.   EXTREMITIES:  Ankles free of any edema.   SKIN:  Warm and dry. Numerous AKs and SKs seen and felt on scalp, large SK noted on anterior chest, near sternum    LABORATORY DATA: Labs, pending.      ASSESSMENT AND PLAN:   1. Medicare Annual Wellness Exam: Up to date with healthcare maintenance strategies.  2. Significant memory loss.  Stable.  They're managing at home.  3. Other fatigue with acquired hypothyroidism: CBC with platelets, TSH with free T4 reflex, and Vitamin D level ordered.  4. Hypercholesterolemia: Not fasting. Lipid profile, ALT ordered.  5. Screening for diabetes mellitus: BMP ordered.  6. Follow up in 1 year or call if there are any questions or concerns.    IAna Rosa, am serving as a scribe to document services personally performed by Dr. Pieter Ulloa, based on data collection and the provider's statements to me. Dr. Ulloa has reviewed, edited, and approved the above note.     I, Dr. Pieter Ulloa, have interviewed and examined this patient, and I have thoroughly reviewed and " edited this note and agree with its contents.

## 2023-01-11 DIAGNOSIS — E03.9 HYPOTHYROIDISM, UNSPECIFIED TYPE: ICD-10-CM

## 2023-01-11 LAB
ALT SERPL W P-5'-P-CCNC: 22 U/L (ref 10–50)
ANION GAP SERPL CALCULATED.3IONS-SCNC: 14 MMOL/L (ref 7–15)
BUN SERPL-MCNC: 30.7 MG/DL (ref 8–23)
CALCIUM SERPL-MCNC: 8.9 MG/DL (ref 8.8–10.2)
CHLORIDE SERPL-SCNC: 107 MMOL/L (ref 98–107)
CHOLEST SERPL-MCNC: 190 MG/DL
CREAT SERPL-MCNC: 1.52 MG/DL (ref 0.67–1.17)
DEPRECATED HCO3 PLAS-SCNC: 23 MMOL/L (ref 22–29)
ERYTHROCYTE [DISTWIDTH] IN BLOOD BY AUTOMATED COUNT: 12.7 % (ref 10–15)
GFR SERPL CREATININE-BSD FRML MDRD: 45 ML/MIN/1.73M2
GLUCOSE SERPL-MCNC: 100 MG/DL (ref 70–99)
HCT VFR BLD AUTO: 44.2 % (ref 40–53)
HDLC SERPL-MCNC: 50 MG/DL
HGB BLD-MCNC: 14.9 G/DL (ref 13.3–17.7)
LDLC SERPL CALC-MCNC: 116 MG/DL
MCH RBC QN AUTO: 31 PG (ref 26.5–33)
MCHC RBC AUTO-ENTMCNC: 33.7 G/DL (ref 31.5–36.5)
MCV RBC AUTO: 92 FL (ref 78–100)
NONHDLC SERPL-MCNC: 140 MG/DL
PLATELET # BLD AUTO: 267 10E3/UL (ref 150–450)
POTASSIUM SERPL-SCNC: 4.5 MMOL/L (ref 3.4–5.3)
RBC # BLD AUTO: 4.81 10E6/UL (ref 4.4–5.9)
SODIUM SERPL-SCNC: 144 MMOL/L (ref 136–145)
TRIGL SERPL-MCNC: 120 MG/DL
TSH SERPL DL<=0.005 MIU/L-ACNC: 1.54 UIU/ML (ref 0.3–4.2)
WBC # BLD AUTO: 6.7 10E3/UL (ref 4–11)

## 2023-01-12 LAB — DEPRECATED CALCIDIOL+CALCIFEROL SERPL-MC: 14 UG/L (ref 20–75)

## 2023-01-12 RX ORDER — LEVOTHYROXINE SODIUM 50 UG/1
50 TABLET ORAL DAILY
Qty: 90 TABLET | Refills: 4 | Status: SHIPPED | OUTPATIENT
Start: 2023-01-12 | End: 2024-05-01

## 2023-01-12 NOTE — TELEPHONE ENCOUNTER
Medication requested: levothyroxine (SYNTHROID/LEVOTHROID) 50 MCG tablet  Last office visit: 1/10/2023  [facility] appointments: none   Medication last refilled: 10/19/2021; 90 + 4 refills  Last qualifying labs:       Component      Latest Ref Rng & Units 1/10/2023   TSH      0.30 - 4.20 uIU/mL 1.54     Prescription approved per [facility] Refill Protocol.    LENNIE Brown, RN  01/12/23, 4:00 PM

## 2023-01-17 DIAGNOSIS — E78.00 HYPERCHOLESTEROLEMIA: ICD-10-CM

## 2023-01-17 DIAGNOSIS — E78.2 MIXED HYPERLIPIDEMIA: ICD-10-CM

## 2023-01-18 RX ORDER — PRAVASTATIN SODIUM 10 MG
10 TABLET ORAL DAILY
Qty: 90 TABLET | Refills: 3 | Status: SHIPPED | OUTPATIENT
Start: 2023-01-18 | End: 2024-03-15

## 2023-01-18 RX ORDER — PRAVASTATIN SODIUM 20 MG
TABLET ORAL
Qty: 90 TABLET | Refills: 3 | Status: SHIPPED | OUTPATIENT
Start: 2023-01-18 | End: 2024-06-05

## 2023-01-18 NOTE — TELEPHONE ENCOUNTER
Pravastatin (Pravachol) 20 mg + 10 mg    Last Office Visit: 1/11/23  Future [facility] Appointments: None  Medication last refilled: 10/19/21 #90 with 4 refill(s) - Both 20 mg + 10 mg    Required labs per protocol:    LAB REF RANGE 10/19/21 1/10/23   LDL < 100 mg/dL 87 116 High     Prescription approved per [facility] Refill Protocol.    XIN ChamberlainN, RN, CCM

## 2023-01-23 ENCOUNTER — TELEPHONE (OUTPATIENT)
Dept: FAMILY MEDICINE | Facility: CLINIC | Age: 83
End: 2023-01-23

## 2023-01-23 NOTE — TELEPHONE ENCOUNTER
Patient's wife called about concerns for lab results. Asked patient if they want to speak with an RN, they declined and would like Dr. Ulloa to call them back.

## 2023-01-27 NOTE — TELEPHONE ENCOUNTER
Call placed to pt's wife to go over Fredrick's lab results. She understands the instructions and will schedule a follow-up appt for Fredrick in 3 months and already has Vitamin D3 on hand.    LENNIE Brown, RN  01/27/23, 9:06 AM

## 2023-02-13 DIAGNOSIS — I48.0 PAROXYSMAL ATRIAL FIBRILLATION (H): ICD-10-CM

## 2023-02-15 RX ORDER — DILTIAZEM HYDROCHLORIDE 180 MG/1
180 CAPSULE, COATED, EXTENDED RELEASE ORAL 2 TIMES DAILY
Qty: 180 CAPSULE | Refills: 0 | Status: SHIPPED | OUTPATIENT
Start: 2023-02-15 | End: 2023-02-24

## 2023-02-15 NOTE — TELEPHONE ENCOUNTER
Last visit 1/10/23, no future visit  Medication is being filled for 1 time refill only due to:  Patient needs labs BMP - ordered.     Pt due for BMP in April per Dr Ulloa.  Meche Tse RN  [facility]

## 2023-02-24 ENCOUNTER — TELEPHONE (OUTPATIENT)
Dept: FAMILY MEDICINE | Facility: CLINIC | Age: 83
End: 2023-02-24

## 2023-02-24 DIAGNOSIS — E03.9 HYPOTHYROIDISM, UNSPECIFIED TYPE: Primary | ICD-10-CM

## 2023-02-24 DIAGNOSIS — E78.00 HYPERCHOLESTEROLEMIA: ICD-10-CM

## 2023-02-24 DIAGNOSIS — I48.0 PAROXYSMAL ATRIAL FIBRILLATION (H): ICD-10-CM

## 2023-02-24 DIAGNOSIS — E78.2 MIXED HYPERLIPIDEMIA: ICD-10-CM

## 2023-02-24 RX ORDER — DILTIAZEM HYDROCHLORIDE 180 MG/1
180 CAPSULE, COATED, EXTENDED RELEASE ORAL 2 TIMES DAILY
Qty: 180 CAPSULE | Refills: 1 | Status: ON HOLD | OUTPATIENT
Start: 2023-02-24 | End: 2023-08-06
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
## 2023-03-01 ENCOUNTER — TELEPHONE (OUTPATIENT)
Dept: CARDIOLOGY | Facility: CLINIC | Age: 83
End: 2023-03-01

## 2023-03-01 DIAGNOSIS — I48.0 PAROXYSMAL ATRIAL FIBRILLATION (H): ICD-10-CM

## 2023-03-01 RX ORDER — LEVOTHYROXINE SODIUM 50 UG/1
50 TABLET ORAL DAILY
Qty: 90 TABLET | Refills: 0 | Status: SHIPPED | OUTPATIENT
Start: 2023-03-01 | End: 2023-08-01

## 2023-03-01 RX ORDER — PRAVASTATIN SODIUM 10 MG
10 TABLET ORAL DAILY
Qty: 90 TABLET | Refills: 0 | Status: SHIPPED | OUTPATIENT
Start: 2023-03-01 | End: 2023-08-01

## 2023-03-01 RX ORDER — DILTIAZEM HYDROCHLORIDE 180 MG/1
180 CAPSULE, COATED, EXTENDED RELEASE ORAL 2 TIMES DAILY
Qty: 180 CAPSULE | Refills: 0 | Status: SHIPPED | OUTPATIENT
Start: 2023-03-01 | End: 2023-08-01

## 2023-03-01 RX ORDER — PRAVASTATIN SODIUM 20 MG
TABLET ORAL
Qty: 90 TABLET | Refills: 0 | Status: SHIPPED | OUTPATIENT
Start: 2023-03-01 | End: 2023-08-01
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
## 2023-03-01 NOTE — TELEPHONE ENCOUNTER
LVM on provided phone number, asked for Rosalva to call back.    Arnold HOGUE, RN  03/01/23 2:20 PM
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
## 2023-03-01 NOTE — TELEPHONE ENCOUNTER
Patient' wife called, requesting to speak with Arnold PLASCENCIA about medication they previously discussed.

## 2023-03-02 DIAGNOSIS — I48.0 PAROXYSMAL ATRIAL FIBRILLATION (H): ICD-10-CM

## 2023-03-02 RX ORDER — DILTIAZEM HYDROCHLORIDE 180 MG/1
180 CAPSULE, COATED, EXTENDED RELEASE ORAL 2 TIMES DAILY
Qty: 180 CAPSULE | Refills: 1 | OUTPATIENT
Start: 2023-03-02

## 2023-06-01 NOTE — LETTER
8/7/2019    Pieter Ulloa MD, MD  [address] [facility]  [facility] [address]    RE: Fredrick Mehta       Dear Colleague,    I had the pleasure of seeing Fredrick Mehta in the [facility].    HPI and Plan: [id number]  See dictation    Orders Placed This Encounter   Procedures     Follow-Up with Electrophysiologist     EKG 12-lead complete w/read - Clinics     EKG 12-lead complete w/read - Clinics (to be scheduled)       No orders of the defined types were placed in this encounter.      There are no discontinued medications.      Encounter Diagnosis   Name Primary?     Paroxysmal atrial fibrillation (H)        CURRENT MEDICATIONS:  Current Outpatient Medications   Medication Sig Dispense Refill     Acetaminophen (TYLENOL PO)        Cetirizine HCl (ZYRTEC ALLERGY PO) Take  by mouth daily as needed.       diltiazem ER COATED BEADS (CARDIZEM CD/CARTIA XT) 240 MG 24 hr capsule Take 1 capsule (240 mg) by mouth 2 times daily 180 capsule 4     donepezil (ARICEPT) 10 MG tablet Take 1 tablet (10 mg) by mouth At Bedtime 30 tablet 0     IBUPROFEN PO        levothyroxine (SYNTHROID/LEVOTHROID) 50 MCG tablet Take 1 tablet (50 mcg) by mouth daily Needs clinic visit for further refills (Patient taking differently: Take 50 mcg by mouth daily ) 90 tablet 3     memantine (NAMENDA) 5 MG tablet 10 mg   1     pravastatin (PRAVACHOL) 10 MG tablet Take 1 tablet (10 mg) by mouth daily 90 tablet 4     pravastatin (PRAVACHOL) 20 MG tablet TAKE ONE TABLET BY MOUTH EVERY EVENING. TAKE TOGETHER WITH 10MG 90 tablet 4     Travoprost 0.004 % SOLN Place 1 drop into both eyes At Bedtime 7.5 mL 3     warfarin (COUMADIN) 5 MG tablet Take 5 mg by mouth M and Th, and 7.5 mg ROW, or as directed per Coumadin clinic. 135 tablet 4     tolterodine ER (DETROL LA) 4 MG 24 hr capsule Take 1 capsule (4 mg) by mouth daily (Patient not taking: Reported on 7/22/2019) 30 capsule 1       ALLERGIES     Allergies   Allergen Reactions     Hay  Spoke to patient she trusts Dr. Seipel and wants to know who he will recommend.  She is wanting to get false teeth.    Fever & [A.R.M.]      Latex      Seasonal Allergies        PAST MEDICAL HISTORY:  Past Medical History:   Diagnosis Date     Anatomical narrow angle      Benign positional vertigo      Cataracts      Glaucoma     OAG     Hypercholesterolemia      Migraine      Paroxysmal atrial fibrillation (H)        PAST SURGICAL HISTORY:  Past Surgical History:   Procedure Laterality Date     PROSTATE SURGERY       SELECTIVE LASER TRABECULOPLASTY (SLT) OD (RIGHT EYE)  2007    RE       FAMILY HISTORY:  Family History   Problem Relation Age of Onset     Glaucoma Maternal Grandfather      Macular Degeneration No family hx of        SOCIAL HISTORY:  Social History     Socioeconomic History     Marital status:      Spouse name: None     Number of children: None     Years of education: None     Highest education level: None   Occupational History     None   Social Needs     Financial resource strain: None     Food insecurity:     Worry: None     Inability: None     Transportation needs:     Medical: None     Non-medical: None   Tobacco Use     Smoking status: Former Smoker     Packs/day: 1.00     Types: Cigarettes     Start date:      Last attempt to quit:      Years since quittin.6     Smokeless tobacco: Never Used   Substance and Sexual Activity     Alcohol use: No     Drug use: No     Sexual activity: None   Lifestyle     Physical activity:     Days per week: None     Minutes per session: None     Stress: None   Relationships     Social connections:     Talks on phone: None     Gets together: None     Attends Sikhism service: None     Active member of club or organization: None     Attends meetings of clubs or organizations: None     Relationship status: None     Intimate partner violence:     Fear of current or ex partner: None     Emotionally abused: None     Physically abused: None     Forced sexual activity: None   Other Topics Concern     Parent/sibling w/ CABG, MI or angioplasty before 65F 55M? Not  "Asked   Social History Narrative     None       Review of Systems:  Skin:  Negative       Eyes:  Positive for glasses    ENT:  Negative      Respiratory:  Negative       Cardiovascular:  Negative      Gastroenterology: Negative      Genitourinary:  Negative      Musculoskeletal:  Negative      Neurologic:  Negative      Psychiatric:  Negative      Heme/Lymph/Imm:  Negative      Endocrine:  Negative        Physical Exam:  Vitals: BP 92/60   Pulse 62   Ht 1.778 m (5' 10\")   Wt 76.4 kg (168 lb 8 oz)   BMI 24.18 kg/m       Constitutional:  cooperative, alert and oriented, well developed, well nourished, in no acute distress        Skin:  warm and dry to the touch          Head:  normocephalic, no masses or lesions        Eyes:  pupils equal and round        Lymph:      ENT:           Neck:  carotid pulses are full and equal bilaterally, JVP normal, no carotid bruit        Respiratory:  clear to auscultation         Cardiac: regular rhythm;normal S1 and S2;no murmurs, gallops or rubs detected bradycardic              not assessed this visit                                        GI:  abdomen soft        Extremities and Muscular Skeletal:  no edema;no deformities, clubbing, cyanosis, erythema observed              Neurological:  no gross motor deficits        Psych:  affect appropriate, oriented to time, person and place        CC  Joseluis Moscoso MD  [address]  [place] [address]                Thank you for allowing me to participate in the care of your patient.      Sincerely,     Supriya Herrera, DIDIER, APRN [facility]    cc:   Joseluis Moscoso MD  [address]  [place] [address]        "

## 2023-06-08 ENCOUNTER — TELEPHONE (OUTPATIENT)
Dept: CARDIOLOGY | Facility: CLINIC | Age: 83
End: 2023-06-08

## 2023-06-08 DIAGNOSIS — I48.0 PAROXYSMAL ATRIAL FIBRILLATION (H): ICD-10-CM

## 2023-06-08 NOTE — TELEPHONE ENCOUNTER
[facility] Call Center    Phone Message    May a detailed message be left on voicemail: yes     Reason for Call: Medication Refill Request    Has the patient contacted the pharmacy for the refill? Yes   Name of medication being requested: apixaban ANTICOAGULANT (ELIQUIS) 5 MG tablet     Provider who prescribed the medication: Dr. Moscoso  Pharmacy:    [facility] [address] [facility]    Date medication is needed: 6/9/2023    [facility] did not automatically refill Fredrick's Rx like they normally do and is down to two days of medication. [place] is requesting an emergency supply of Eliquis be sent to the [facility] and delivered to their house.      Action Taken: Other: Cardiology    Travel Screening: Not Applicable     Thank you!  [facility]

## 2023-08-01 ENCOUNTER — APPOINTMENT (OUTPATIENT)
Dept: CT IMAGING | Facility: CLINIC | Age: 83
DRG: 872 | End: 2023-08-01
Attending: STUDENT IN AN ORGANIZED HEALTH CARE EDUCATION/TRAINING PROGRAM
Payer: COMMERCIAL

## 2023-08-01 ENCOUNTER — APPOINTMENT (OUTPATIENT)
Dept: GENERAL RADIOLOGY | Facility: CLINIC | Age: 83
DRG: 872 | End: 2023-08-01
Attending: STUDENT IN AN ORGANIZED HEALTH CARE EDUCATION/TRAINING PROGRAM
Payer: COMMERCIAL

## 2023-08-01 ENCOUNTER — APPOINTMENT (OUTPATIENT)
Dept: MRI IMAGING | Facility: CLINIC | Age: 83
DRG: 872 | End: 2023-08-01
Attending: STUDENT IN AN ORGANIZED HEALTH CARE EDUCATION/TRAINING PROGRAM
Payer: COMMERCIAL

## 2023-08-01 ENCOUNTER — HOSPITAL ENCOUNTER (INPATIENT)
Facility: CLINIC | Age: 83
LOS: 6 days | Discharge: NURSING, REHAB OR LONG-TERM CARE | DRG: 872 | End: 2023-08-07
Attending: STUDENT IN AN ORGANIZED HEALTH CARE EDUCATION/TRAINING PROGRAM | Admitting: INTERNAL MEDICINE
Payer: COMMERCIAL

## 2023-08-01 DIAGNOSIS — A41.9 SEPSIS WITHOUT ACUTE ORGAN DYSFUNCTION, DUE TO UNSPECIFIED ORGANISM (H): Primary | ICD-10-CM

## 2023-08-01 DIAGNOSIS — R42 DIZZINESS: ICD-10-CM

## 2023-08-01 LAB
ALBUMIN SERPL BCG-MCNC: 4.1 G/DL (ref 3.5–5.2)
ALBUMIN UR-MCNC: NEGATIVE MG/DL
ALP SERPL-CCNC: 84 U/L (ref 40–129)
ALT SERPL W P-5'-P-CCNC: 13 U/L (ref 0–70)
ANION GAP SERPL CALCULATED.3IONS-SCNC: 10 MMOL/L (ref 7–15)
APPEARANCE UR: ABNORMAL
AST SERPL W P-5'-P-CCNC: 22 U/L (ref 0–45)
ATRIAL RATE - MUSE: 56 BPM
BASOPHILS # BLD AUTO: 0 10E3/UL (ref 0–0.2)
BASOPHILS NFR BLD AUTO: 0 %
BILIRUB SERPL-MCNC: 0.5 MG/DL
BILIRUB UR QL STRIP: NEGATIVE
BUN SERPL-MCNC: 19 MG/DL (ref 8–23)
CALCIUM SERPL-MCNC: 9.2 MG/DL (ref 8.8–10.2)
CHLORIDE SERPL-SCNC: 103 MMOL/L (ref 98–107)
COLOR UR AUTO: ABNORMAL
CREAT SERPL-MCNC: 1.35 MG/DL (ref 0.67–1.17)
DEPRECATED HCO3 PLAS-SCNC: 26 MMOL/L (ref 22–29)
DIASTOLIC BLOOD PRESSURE - MUSE: NORMAL MMHG
EOSINOPHIL # BLD AUTO: 0.1 10E3/UL (ref 0–0.7)
EOSINOPHIL NFR BLD AUTO: 1 %
ERYTHROCYTE [DISTWIDTH] IN BLOOD BY AUTOMATED COUNT: 12.5 % (ref 10–15)
GFR SERPL CREATININE-BSD FRML MDRD: 52 ML/MIN/1.73M2
GLUCOSE SERPL-MCNC: 106 MG/DL (ref 70–99)
GLUCOSE UR STRIP-MCNC: NEGATIVE MG/DL
HCT VFR BLD AUTO: 44.2 % (ref 40–53)
HGB BLD-MCNC: 14.8 G/DL (ref 13.3–17.7)
HGB UR QL STRIP: ABNORMAL
HOLD SPECIMEN: NORMAL
HOLD SPECIMEN: NORMAL
IMM GRANULOCYTES # BLD: 0 10E3/UL
IMM GRANULOCYTES NFR BLD: 0 %
INTERPRETATION ECG - MUSE: NORMAL
KETONES UR STRIP-MCNC: NEGATIVE MG/DL
LACTATE SERPL-SCNC: 1 MMOL/L (ref 0.7–2)
LEUKOCYTE ESTERASE UR QL STRIP: ABNORMAL
LYMPHOCYTES # BLD AUTO: 1.2 10E3/UL (ref 0.8–5.3)
LYMPHOCYTES NFR BLD AUTO: 16 %
MCH RBC QN AUTO: 31.4 PG (ref 26.5–33)
MCHC RBC AUTO-ENTMCNC: 33.5 G/DL (ref 31.5–36.5)
MCV RBC AUTO: 94 FL (ref 78–100)
MONOCYTES # BLD AUTO: 0.4 10E3/UL (ref 0–1.3)
MONOCYTES NFR BLD AUTO: 5 %
NEUTROPHILS # BLD AUTO: 5.6 10E3/UL (ref 1.6–8.3)
NEUTROPHILS NFR BLD AUTO: 78 %
NITRATE UR QL: NEGATIVE
NRBC # BLD AUTO: 0 10E3/UL
NRBC BLD AUTO-RTO: 0 /100
P AXIS - MUSE: 35 DEGREES
PH UR STRIP: 7.5 [PH] (ref 5–7)
PLATELET # BLD AUTO: 202 10E3/UL (ref 150–450)
POTASSIUM SERPL-SCNC: 4.4 MMOL/L (ref 3.4–5.3)
PR INTERVAL - MUSE: 190 MS
PROT SERPL-MCNC: 6.9 G/DL (ref 6.4–8.3)
QRS DURATION - MUSE: 108 MS
QT - MUSE: 446 MS
QTC - MUSE: 430 MS
R AXIS - MUSE: -39 DEGREES
RBC # BLD AUTO: 4.71 10E6/UL (ref 4.4–5.9)
RBC URINE: >182 /HPF
SODIUM SERPL-SCNC: 139 MMOL/L (ref 136–145)
SP GR UR STRIP: 1 (ref 1–1.03)
SYSTOLIC BLOOD PRESSURE - MUSE: NORMAL MMHG
T AXIS - MUSE: 41 DEGREES
TROPONIN T SERPL HS-MCNC: 8 NG/L
TROPONIN T SERPL HS-MCNC: 8 NG/L
UROBILINOGEN UR STRIP-MCNC: NORMAL MG/DL
VENTRICULAR RATE- MUSE: 56 BPM
WBC # BLD AUTO: 7.4 10E3/UL (ref 4–11)
WBC URINE: 10 /HPF

## 2023-08-01 PROCEDURE — 83605 ASSAY OF LACTIC ACID: CPT | Performed by: EMERGENCY MEDICINE

## 2023-08-01 PROCEDURE — 36415 COLL VENOUS BLD VENIPUNCTURE: CPT | Performed by: EMERGENCY MEDICINE

## 2023-08-01 PROCEDURE — 70450 CT HEAD/BRAIN W/O DYE: CPT

## 2023-08-01 PROCEDURE — 99285 EMERGENCY DEPT VISIT HI MDM: CPT | Mod: 25

## 2023-08-01 PROCEDURE — 250N000013 HC RX MED GY IP 250 OP 250 PS 637: Performed by: INTERNAL MEDICINE

## 2023-08-01 PROCEDURE — 70547 MR ANGIOGRAPHY NECK W/O DYE: CPT

## 2023-08-01 PROCEDURE — 258N000003 HC RX IP 258 OP 636: Performed by: EMERGENCY MEDICINE

## 2023-08-01 PROCEDURE — 70544 MR ANGIOGRAPHY HEAD W/O DYE: CPT

## 2023-08-01 PROCEDURE — 255N000002 HC RX 255 OP 636: Performed by: INTERNAL MEDICINE

## 2023-08-01 PROCEDURE — 250N000011 HC RX IP 250 OP 636: Mod: JZ | Performed by: INTERNAL MEDICINE

## 2023-08-01 PROCEDURE — A9585 GADOBUTROL INJECTION: HCPCS | Performed by: INTERNAL MEDICINE

## 2023-08-01 PROCEDURE — 85025 COMPLETE CBC W/AUTO DIFF WBC: CPT | Performed by: EMERGENCY MEDICINE

## 2023-08-01 PROCEDURE — 120N000001 HC R&B MED SURG/OB

## 2023-08-01 PROCEDURE — 258N000003 HC RX IP 258 OP 636: Performed by: STUDENT IN AN ORGANIZED HEALTH CARE EDUCATION/TRAINING PROGRAM

## 2023-08-01 PROCEDURE — 258N000003 HC RX IP 258 OP 636: Performed by: INTERNAL MEDICINE

## 2023-08-01 PROCEDURE — 96360 HYDRATION IV INFUSION INIT: CPT

## 2023-08-01 PROCEDURE — 99222 1ST HOSP IP/OBS MODERATE 55: CPT | Performed by: INTERNAL MEDICINE

## 2023-08-01 PROCEDURE — 70549 MR ANGIOGRAPH NECK W/O&W/DYE: CPT

## 2023-08-01 PROCEDURE — 93005 ELECTROCARDIOGRAM TRACING: CPT

## 2023-08-01 PROCEDURE — 81001 URINALYSIS AUTO W/SCOPE: CPT | Performed by: EMERGENCY MEDICINE

## 2023-08-01 PROCEDURE — 84484 ASSAY OF TROPONIN QUANT: CPT | Performed by: STUDENT IN AN ORGANIZED HEALTH CARE EDUCATION/TRAINING PROGRAM

## 2023-08-01 PROCEDURE — 96361 HYDRATE IV INFUSION ADD-ON: CPT

## 2023-08-01 PROCEDURE — 70553 MRI BRAIN STEM W/O & W/DYE: CPT

## 2023-08-01 PROCEDURE — 36415 COLL VENOUS BLD VENIPUNCTURE: CPT | Performed by: STUDENT IN AN ORGANIZED HEALTH CARE EDUCATION/TRAINING PROGRAM

## 2023-08-01 PROCEDURE — 73560 X-RAY EXAM OF KNEE 1 OR 2: CPT | Mod: 50

## 2023-08-01 PROCEDURE — 80053 COMPREHEN METABOLIC PANEL: CPT | Performed by: STUDENT IN AN ORGANIZED HEALTH CARE EDUCATION/TRAINING PROGRAM

## 2023-08-01 RX ORDER — LEVOTHYROXINE SODIUM 50 UG/1
50 TABLET ORAL DAILY
Status: DISCONTINUED | OUTPATIENT
Start: 2023-08-02 | End: 2023-08-07 | Stop reason: HOSPADM

## 2023-08-01 RX ORDER — PRAVASTATIN SODIUM 10 MG
10 TABLET ORAL DAILY
Status: DISCONTINUED | OUTPATIENT
Start: 2023-08-01 | End: 2023-08-01

## 2023-08-01 RX ORDER — GADOBUTROL 604.72 MG/ML
10 INJECTION INTRAVENOUS ONCE
Status: COMPLETED | OUTPATIENT
Start: 2023-08-01 | End: 2023-08-01

## 2023-08-01 RX ORDER — MEMANTINE HYDROCHLORIDE 10 MG/1
10 TABLET ORAL AT BEDTIME
Status: DISCONTINUED | OUTPATIENT
Start: 2023-08-01 | End: 2023-08-07 | Stop reason: HOSPADM

## 2023-08-01 RX ORDER — SODIUM CHLORIDE 9 MG/ML
INJECTION, SOLUTION INTRAVENOUS CONTINUOUS
Status: DISCONTINUED | OUTPATIENT
Start: 2023-08-01 | End: 2023-08-02

## 2023-08-01 RX ORDER — ONDANSETRON 4 MG/1
4 TABLET, ORALLY DISINTEGRATING ORAL EVERY 6 HOURS PRN
Status: DISCONTINUED | OUTPATIENT
Start: 2023-08-01 | End: 2023-08-07 | Stop reason: HOSPADM

## 2023-08-01 RX ORDER — MEMANTINE HYDROCHLORIDE 10 MG/1
10 TABLET ORAL AT BEDTIME
COMMUNITY

## 2023-08-01 RX ORDER — ONDANSETRON 2 MG/ML
4 INJECTION INTRAMUSCULAR; INTRAVENOUS EVERY 6 HOURS PRN
Status: DISCONTINUED | OUTPATIENT
Start: 2023-08-01 | End: 2023-08-07 | Stop reason: HOSPADM

## 2023-08-01 RX ORDER — ACETAMINOPHEN 325 MG/1
650 TABLET ORAL ONCE
Status: COMPLETED | OUTPATIENT
Start: 2023-08-01 | End: 2023-08-01

## 2023-08-01 RX ORDER — DONEPEZIL HYDROCHLORIDE 10 MG/1
10 TABLET, FILM COATED ORAL AT BEDTIME
Status: DISCONTINUED | OUTPATIENT
Start: 2023-08-01 | End: 2023-08-07 | Stop reason: HOSPADM

## 2023-08-01 RX ADMIN — GADOBUTROL 10 ML: 604.72 INJECTION INTRAVENOUS at 18:24

## 2023-08-01 RX ADMIN — SODIUM CHLORIDE, POTASSIUM CHLORIDE, SODIUM LACTATE AND CALCIUM CHLORIDE 500 ML: 600; 310; 30; 20 INJECTION, SOLUTION INTRAVENOUS at 15:46

## 2023-08-01 RX ADMIN — DONEPEZIL HYDROCHLORIDE 10 MG: 10 TABLET ORAL at 20:23

## 2023-08-01 RX ADMIN — ONDANSETRON 4 MG: 2 INJECTION INTRAMUSCULAR; INTRAVENOUS at 20:42

## 2023-08-01 RX ADMIN — ACETAMINOPHEN 650 MG: 325 TABLET, FILM COATED ORAL at 18:45

## 2023-08-01 RX ADMIN — APIXABAN 5 MG: 5 TABLET, FILM COATED ORAL at 20:23

## 2023-08-01 RX ADMIN — SODIUM CHLORIDE 500 ML: 9 INJECTION, SOLUTION INTRAVENOUS at 13:22

## 2023-08-01 RX ADMIN — SODIUM CHLORIDE: 9 INJECTION, SOLUTION INTRAVENOUS at 20:22

## 2023-08-01 RX ADMIN — MEMANTINE 10 MG: 10 TABLET ORAL at 20:23

## 2023-08-01 RX ADMIN — PRAVASTATIN SODIUM 30 MG: 20 TABLET ORAL at 20:23

## 2023-08-01 ASSESSMENT — ACTIVITIES OF DAILY LIVING (ADL)
ADLS_ACUITY_SCORE: 39
ADLS_ACUITY_SCORE: 39
ADLS_ACUITY_SCORE: 35
ADLS_ACUITY_SCORE: 39

## 2023-08-01 NOTE — H&P
[facility]   History & Physical    Fredrick Mehta MRN# [id number]   Age: 82 year old YOB: 1940     Date of Admission: 8/1/2023    Primary Care Provider: Pieter Ulloa         Chief Complaint:   dizziness         History of Present Illness:   Fredrick Mehta is a 82 year old male with history of Alzheimer's dementia who presented to the ED with his wife for evaluation of dizziness. Patient reportedly at his usual state of health until this morning when he woke up around 10:30 AM and felt dizzy and unable to walk study. Patient reports worsening of his dizziness when he tried to get up and walk. He reports feeling sick to his stomach. No vomiting. He is awake alert and oriented. He was able to get up and tried to walk with assistance in the ED. His EKG showed no acute ischemic changes except relative bradycardia. He does have history of paroxysmal atrial fibrillation on chronic anticoagulation. His labs essentially within normal limits except for an elevated serum creatinine level which is at his baseline. He apparently has a chronic kidney disease. CT of the head without contrast negative for any acute intracranial abnormalities. An MRI of the brain with and without and MRA [place] of ortiz ordered the ED, pending.   He is afebrile. Normal leukocytosis. Lactic acid level within normal limits  Orthostatic vitals the ED and patient received 1 L of IV fluid bolus         Review of Systems:   A 10 point review of systems was performed and is negative unless otherwise noted in HPI.   ROS         Past Medical History:     Past Medical History:   Diagnosis Date    Anatomical narrow angle     Benign positional vertigo     Cataracts     Glaucoma     OAG    Hypercholesterolemia     Migraine     Paroxysmal atrial fibrillation (H)              Past Surgical History:      Past Surgical History:   Procedure Laterality Date    PROSTATE SURGERY  1999    SELECTIVE LASER TRABECULOPLASTY (SLT) OD  (RIGHT EYE)  2007    RE             Family History:     Family History   Problem Relation Age of Onset    Glaucoma Maternal Grandfather     Macular Degeneration No family hx of              Social History:     Social History     Tobacco Use    Smoking status: Former     Packs/day: 1.00     Years: 28.00     Pack years: 28.00     Types: Cigarettes     Start date:      Quit date:      Years since quittin.6    Smokeless tobacco: Never   Substance Use Topics    Alcohol use: Yes     Comment: 1 drink of wine per day             Medications:   No current facility-administered medications on file prior to encounter.  Acetaminophen (TYLENOL PO),   apixaban ANTICOAGULANT (ELIQUIS) 5 MG tablet, Take 1 tablet (5 mg) by mouth 2 times daily  Cetirizine HCl (ZYRTEC ALLERGY PO), Take  by mouth daily as needed.  diltiazem ER COATED BEADS (CARDIZEM CD/CARTIA XT) 180 MG 24 hr capsule, Take 1 capsule (180 mg) by mouth 2 times daily for 180 doses  diltiazem ER COATED BEADS (CARDIZEM CD/CARTIA XT) 180 MG 24 hr capsule, Take 1 capsule (180 mg) by mouth 2 times daily  donepezil (ARICEPT) 10 MG tablet, Take 1 tablet (10 mg) by mouth At Bedtime  guaiFENesin-codeine (ROBITUSSIN AC) 100-10 MG/5ML solution, Take 5 mLs by mouth every 4 hours as needed for cough  IBUPROFEN PO,   latanoprost (XALATAN) 0.005 % ophthalmic solution,   levothyroxine (SYNTHROID/LEVOTHROID) 50 MCG tablet, Take 1 tablet (50 mcg) by mouth daily for 90 doses  levothyroxine (SYNTHROID/LEVOTHROID) 50 MCG tablet, Take 1 tablet (50 mcg) by mouth daily  memantine (NAMENDA) 5 MG tablet, 10 mg   pravastatin (PRAVACHOL) 10 MG tablet, Take 1 tablet (10 mg) by mouth daily for 90 doses  pravastatin (PRAVACHOL) 10 MG tablet, Take 1 tablet (10 mg) by mouth daily  pravastatin (PRAVACHOL) 20 MG tablet, TAKE ONE TABLET BY MOUTH DAILY. TAKE TOGETHER WITH 10MG  pravastatin (PRAVACHOL) 20 MG tablet, TAKE ONE TABLET BY MOUTH EVERY EVENING. TAKE TOGETHER WITH 10MG              "Allergies:     Allergies   Allergen Reactions    Hay Fever & [A.R.M.]     Latex     Seasonal Allergies              Physical Exam:   /67   Pulse 52   Temp 97.3  F (36.3  C) (Temporal)   Resp 18   Ht 1.803 m (5' 11\")   Wt 72.6 kg (160 lb)   SpO2 98%   BMI 22.32 kg/m     GENERAL: Alert and oriented x 3. NAD.   HEENT: Anicteric sclera. PERRL. Mucous membranes moist and without lesions.   CV: RRR. S1, S2. No murmurs appreciated.   RESPIRATORY: Effort normal. Lungs CTAB with no wheezing, rales, rhonchi.   GI: soft, understanding bowel sounds are active.  MUSCULOSKELETAL: No joint swelling or tenderness. Moves all extremities.   NEUROLOGICAL: No focal deficits.NO any motor deficits noted  EXTREMITIES: No peripheral edema. Intact bilateral pedal pulses.   SKIN: No jaundice. No rashes.     Labs and imaging reviewed and significant for ;         Assessment and Plan:   Fredrick Mehta is a 82 year old male with past medical history of Alzheimer's disease, hypothyroidism, paroxysmal atrial fibrillation and chronic anticoagulation    #Dizziness, suspect due to orthostatic hypotension secondary to medication. Patient is on Cardizem  mg daily. Sinus bradycardia with heart rate 52, blood pressure soft. Need to rule out acute intracranial abnormalities. CT of the head is negative for any acute bleed, MRI of the brain and MRA pending. Will continue maintenance IV fluid, hold Cardizem ED for now. Of note, EKG is unremarkable and cardiac enzymes also within normal limits.  History of paroxysmal atrial fibrillation. In sinus rhythm with sinus bradycardia. On chronic anticoagulation. Will continue eliquis. Hold Cardizem ED for now notice of blood pressure and bradycardia.  Chronic kidney disease. Creatinine appears to be at baseline.  Sinus bradycardia  Alzheimer's disease and dementia.  Hypothyroidism on Synthroid  Disequilibrium. PT and OT to evaluate and treat      FEN: regular cardiogram  DVT ppx: continue " eliquis  Code Status: full code  Disposition Plan   Expected discharge in tomorrow days to prior living arrangement once medically stable and work is completed..     Entered: Eusebia Serrano MD 08/01/2023, 5:48 PM           Eusebia Serrano MD  Hospitalist Service

## 2023-08-01 NOTE — ED NOTES
Pt requested to be catheterized for urine sample.  The writer attempted to place a strait cath but was unable to advance to urine return due to resistance.  Pt placed on male purewick after this failer urine collection attempt.  Pt able to void into purewick.

## 2023-08-01 NOTE — ED PROVIDER NOTES
"  History     Chief Complaint:  Dizziness       The history is provided by the patient and the spouse.      Fredrick Mehta is a 82 year old male with a history of Alzheimer's on Eliquis who presents with dizziness. The wife reports that the patient woke up around 1030 this morning with sudden onset dizziness and lightheaded and felt \"sick to his stomach\". He notes that he was able to walk a little bit today but the dizziness worsens with standing up. The patient felt normal yesterday and denies any prior episodes. The patient  denies trauma to the head, loss of consciousness, vision changes, slurred speech, chest pain, abdominal pain, shortness of breath, abnormal urinary symptoms or diarrhea. Denies new medication. Denies recent surgeries. Patient is unsure of confusion. Wife notes the patient is eating and drinking normally. Of note, patient came to ED via wheelchair    Independent Historian:   Patient and spouse as noted above    Review of External Notes:   I reviewed his family office note on 01/10/23      Medications:    Apixaban  Donepezil  Robitussin  Levothyroxine  Memantine  Pravastatin   Eliquis      Past Medical History:    Anatomical narrow angle  Benign positional vertigo  Cataract  Glaucoma  Hypercholesterolemia  Migraine  Paroxysmal atrial fibrillation       Past Surgical History:    Prostate surgery  Laser trabeculoplasty        Physical Exam   Patient Vitals for the past 24 hrs:   BP Temp Temp src Pulse Resp SpO2 Height Weight   08/01/23 1538 -- -- -- -- -- 98 % -- --   08/01/23 1537 124/67 -- -- 52 -- -- -- --   08/01/23 1302 117/60 97.3  F (36.3  C) Temporal 61 18 94 % 1.803 m (5' 11\") 72.6 kg (160 lb)        Physical Exam  GENERAL: Patient well-appearing  HEAD: Atraumatic.  Eyes: Anicteric  NOSE: No active bleeding  MOUTH: Moist mucosa  THROAT: Patent airway.   Neck: No rigidity  CV: RRR, no murmurs rubs or gallops  PULM: CTAB with good aeration; no retractions, rales, rhonchi, or wheezing  ABD: " Soft, nontender, nondistended, no guarding, no peritoneal signs   DERM: No rash. Skin warm and dry  EXTREMITY: Moving all extremities without difficulty. No calf tenderness or peripheral edema  VASCULAR: Symmetric pulses bilaterally  NEURO: A,Ox3. CN 2-12 fully intact. Strength 5/5 bilateral LE/UE. Sensation fully intact to light touch symmetrically bilateral LE/UE. Normal finger-to-nose and heel to shin.  Unsteady on feet and stumbles with standing.      Emergency Department Course   ECG  ECG taken at 1358, ECG read at 1402  Sinus bradycardia  Left axis deviation  Abnormal ECG   Rate 56 bpm. ME interval 190 ms. QRS duration 108 ms. QT/QTc 446/430 ms. P-R-T axes 35 -39 41.     Imaging:  XR Knee Bilateral 1/2 Views   Final Result   IMPRESSION:       Right knee: No acute fracture or malalignment. Mild patellofemoral   compartment degenerative changes. No knee joint effusion. Osteopenia.      Left knee: No acute fracture or malalignment. Mild patellofemoral   compartment degenerative changes. No knee joint effusion. Screw   fixation in the distal femur. Osteopenia.      RAMILA VALENCIA MD            SYSTEM ID:  [id number]      CT Head w/o Contrast   Final Result   IMPRESSION:      1. No evidence of acute intracranial hemorrhage, mass, or herniation.   2. Mild diffuse parenchymal volume loss and white matter changes   likely due to chronic microvascular ischemic disease.         IGNACIA LYNCH MD            SYSTEM ID:  [id number]      MR Brain w/o & w Contrast    (Results Pending)   MRA Brain ([place]) wo Contrast    (Results Pending)   MRA Neck (Carotids) wo Contrast    (Results Pending)      Report per radiology    Laboratory:  Labs Ordered and Resulted from Time of ED Arrival to Time of ED Departure   COMPREHENSIVE METABOLIC PANEL - Abnormal       Result Value    Sodium 139      Potassium 4.4      Chloride 103      Carbon Dioxide (CO2) 26      Anion Gap 10      Urea Nitrogen 19.0      Creatinine 1.35 (*)      Calcium 9.2      Glucose 106 (*)     Alkaline Phosphatase 84      AST 22      ALT 13      Protein Total 6.9      Albumin 4.1      Bilirubin Total 0.5      GFR Estimate 52 (*)    TROPONIN T, HIGH SENSITIVITY - Normal    Troponin T, High Sensitivity 8     LACTIC ACID WHOLE BLOOD - Normal    Lactic Acid 1.0     TROPONIN T, HIGH SENSITIVITY - Normal    Troponin T, High Sensitivity 8     CBC WITH PLATELETS AND DIFFERENTIAL    WBC Count 7.4      RBC Count 4.71      Hemoglobin 14.8      Hematocrit 44.2      MCV 94      MCH 31.4      MCHC 33.5      RDW 12.5      Platelet Count 202      % Neutrophils 78      % Lymphocytes 16      % Monocytes 5      % Eosinophils 1      % Basophils 0      % Immature Granulocytes 0      NRBCs per 100 WBC 0      Absolute Neutrophils 5.6      Absolute Lymphocytes 1.2      Absolute Monocytes 0.4      Absolute Eosinophils 0.1      Absolute Basophils 0.0      Absolute Immature Granulocytes 0.0      Absolute NRBCs 0.0     ROUTINE UA WITH MICROSCOPIC REFLEX TO CULTURE           Emergency Department Course & Assessments:  Interventions:  Medications   lactated ringers BOLUS 500 mL (500 mLs Intravenous $New Bag 8/1/23 1546)   0.9% sodium chloride BOLUS (500 mLs Intravenous $New Bag 8/1/23 1322)           Independent Interpretation (X-rays, CTs, rhythm strip):  X-ray left knee screw in place left distal femur appropriate position.    Assessments:/Consultations/Discussion of Management or Tests:  ED Course as of 08/01/23 1622   Tue Aug 01, 2023   1316 I obtained history and examined the patient as noted above.    1512 I spoke and consulted with internist, Dr. Serrano.        Social Determinants of Health affecting care:   None    Disposition:  The patient was admitted to the hospital under the care of Dr. Serrano    Impression & Plan         Medical Decision Making:  Patient presented feeling lightheaded and dizzy with standing.   Chronic conditions complicating -atrial fibrillation, on apixaban.  DDx  considered CVA, cardiac etiologies, infection, metabolic abnormality, among others.  VS unremarkable  PE no focal neurologic deficit.  When I try to stand the patient up he feels off balance.  I will see the patient back down he feels better.  Symptoms are positional and appear orthostatic in nature -no orthostatic vitals.  Labs unremarkable.  Given fluids but persistently feels dizzy and off balance with standing.  However at rest he has no symptoms.  Therefore, ordered CT head which is nonacute.  Ordered MRI brain and MRA head and neck.  MRI required patient get x-rays of his knees because of concern for potential metal in his knees.  He has appropriate pain screws in his left leg that should not preclude MRI imaging.  Due to inability ambulate without feeling off balance, consult hospitalist and patient will be admitted pending MRI.  With no focal neurologic deficit and patient awoke with symptoms, if he does have a CVA, tPA not indicated.      Critical Care time:  was 0 minutes for this patient excluding procedures.    Diagnosis:    ICD-10-CM    1. Dizziness  R42            Discharge Medications:  New Prescriptions    No medications on file          Blayne Banuelos MD  8/1/2023   Blayne Banuelos MD Foss, Kevin, MD  08/01/23 5695

## 2023-08-01 NOTE — ED NOTES
"Tele-PIT/Intake Evaluation      Video-Visit Details    Type of service:  Video Visit    Video Start Time (time video started): 1:02 PM  Video End Time (time video stopped): 1:07 PM   Originating Location (pt. Location): [facility]  Distant Location (provider location): [facility]  Mode of Communication:  Video Conference via Fluid  Patient verbally consented to [employer] televisit.    History:  History of alzheimers.  Was tired this morning and woke up late.  When he got up he felt dizzy and nauseated.  Felt near syncopal worse with moving from sitting to standing.  No chest pain.  No abdominal pain.  One episode of vomiting.  No fever.  On eliquis.  No falls.  No headache.      Exam:  General:  Alert, interactive.  Sitting up in chair.   Cardiovascular:  Well perfused  Lungs:  No respiratory distress, no accessory muscle use  Neuro:  Moving all 4 extremities  Skin:  Warm, dry  Psych:  Normal affect    Patient Vitals for the past 24 hrs:   BP Temp Temp src Pulse Resp SpO2 Height Weight   08/01/23 1302 117/60 97.3  F (36.3  C) Temporal 61 18 94 % 1.803 m (5' 11\") 72.6 kg (160 lb)       Appropriate interventions for symptom management were initiated if applicable.  Appropriate diagnostic tests were initiated if indicated.    Important information for subsequent clinician:      I briefly evaluated the patient and developed an initial plan of care. I discussed this plan and explained that this brief interaction does not constitute a full evaluation. Patient/family understands that they should wait to be fully evaluated and discuss any test results with another clinician prior to leaving the hospital.       Katiuska Killian MD  08/01/23 1307    "

## 2023-08-01 NOTE — ED NOTES
"[facility]  ED Nurse Handoff Report    ED Chief complaint: Dizziness      ED Diagnosis:   Final diagnoses:   Dizziness       Code Status: Not on file    Allergies:   Allergies   Allergen Reactions    Hay Fever & [A.R.M.]     Latex     Seasonal Allergies        Patient Story: Pt presents with dizziness and feeling \"sick to his stomach\" at 1030 am today.  Focused Assessment:  Pt reports dizziness and feeling sick to his stomach at 1030 today.  Pt has Alzherimer's at baseline.  Pt reports dizziness that is worse with standing and movement.  Lab work unremarkable, CT of head without contrast unremarkable.  Vital signs stable.  Pt cooperative and able to stand with assistance.  MRI pending.  Pt given 500 mls NS and 500 mls LR.  Wife at bedside, plans for admission.    Treatments and/or interventions provided: See above  Patient's response to treatments and/or interventions: See above    To be done/followed up on inpatient unit:  NA    Does this patient have any cognitive concerns?: Alzheimer's    Activity level - Baseline/Home:  Independent  Activity Level - Current:   Stand with Assist    Patient's Preferred language: English   Needed?: No    Isolation: None  Infection: Not Applicable  Patient tested for COVID 19 prior to admission: NO  Bariatric?: No    Vital Signs:   Vitals:    08/01/23 1302 08/01/23 1537 08/01/23 1538   BP: 117/60 124/67    Pulse: 61 52    Resp: 18     Temp: 97.3  F (36.3  C)     TempSrc: Temporal     SpO2: 94%  98%   Weight: 72.6 kg (160 lb)     Height: 1.803 m (5' 11\")         Cardiac Rhythm:     Was the PSS-3 completed:   Yes    What interventions are required if any?               Family Comments: Wife at bedside, involved in cares.  OBS brochure/video discussed/provided to patient/family: N/A              Name of person given brochure if not patient: NA              Relationship to patient: NA    For the majority of the shift this patient's behavior was Green. "   Behavioral interventions performed were NA.    ED NURSE PHONE NUMBER: [phone]

## 2023-08-01 NOTE — ED NOTES
Pt sit/stand with assistance. Unable to stand for prolonged period d/t dizziness. Off balance when standing. Incontinent, brief changed. Pt stated he was unable to give urine sample and became frustrated when asked. MD updated, will reapproach.

## 2023-08-01 NOTE — PHARMACY-ADMISSION MEDICATION HISTORY
Pharmacist Admission Medication History    Admission medication history is complete. The information provided in this note is only as accurate as the sources available at the time of the update.    Medication reconciliation/reorder completed by provider prior to medication history? Yes    Information Source(s): Family member, Clinic records, and CareEverywhere/SureScripts via in-person    Pertinent Information:   - Spoke with patient's wife Rosalva about Fredrick's medications. She was unsure about some of the medication strengths. SureScripts and outside clinic notes used to confirm doses.    Changes made to PTA medication list:  Added: None  Deleted: Duplicate entries for diltiazem and pravastatin, guaifenesin-codeine  Changed: Memantine 5 mg HS -> Memantine 10 mg HS    Medication Affordability:  Not including over the counter (OTC) medications, was there a time in the past 3 months when you did not take your medications as prescribed because of cost?: No    Allergies reviewed with patient and updates made in EHR: yes    Medication History Completed By: Nacho Tang [facility] 8/1/2023 6:38 PM    Prior to Admission medications    Medication Sig Last Dose Taking? Auth Provider Long Term End Date   Acetaminophen (TYLENOL PO) Take 325 mg by mouth every 6 hours as needed for mild pain More than a month at PRN Yes Reported, Patient     apixaban ANTICOAGULANT (ELIQUIS) 5 MG tablet Take 1 tablet (5 mg) by mouth 2 times daily 8/1/2023 at AM Yes Joseluis Moscoso MD     Cetirizine HCl (ZYRTEC ALLERGY PO) Take  by mouth daily as needed. More than a month at PRN Yes Reported, Patient Yes    diltiazem ER COATED BEADS (CARDIZEM CD/CARTIA XT) 180 MG 24 hr capsule Take 1 capsule (180 mg) by mouth 2 times daily 8/1/2023 at AM Yes Pieter Ulloa MD Yes    IBUPROFEN PO  More than a month at PRN Yes Reported, Patient     latanoprost (XALATAN) 0.005 % ophthalmic solution Place 1 drop into both eyes At Bedtime 7/31/2023 at  HS Yes Reported, Patient     levothyroxine (SYNTHROID/LEVOTHROID) 50 MCG tablet Take 1 tablet (50 mcg) by mouth daily 7/31/2023 at AM Yes Pieter Ulloa MD Yes    memantine (NAMENDA) 10 MG tablet Take 10 mg by mouth At Bedtime 7/31/2023 at HS Yes Unknown, Entered By History     pravastatin (PRAVACHOL) 10 MG tablet Take 1 tablet (10 mg) by mouth daily 7/31/2023 at HS Yes Pieter Ulloa MD Yes    pravastatin (PRAVACHOL) 20 MG tablet TAKE ONE TABLET BY MOUTH EVERY EVENING. TAKE TOGETHER WITH 10MG 7/31/2023 at HS Yes Pieter Ulloa MD Yes    donepezil (ARICEPT) 10 MG tablet Take 1 tablet (10 mg) by mouth At Bedtime   Pieter Ulloa MD

## 2023-08-01 NOTE — ED TRIAGE NOTES
Pt comes in with wife for dizziness, difficulty standing and nausea that started this morning. Wife says he woke up around 1030 like this. Hx alzheimers. On eliquis. Denies fall

## 2023-08-02 ENCOUNTER — APPOINTMENT (OUTPATIENT)
Dept: GENERAL RADIOLOGY | Facility: CLINIC | Age: 83
DRG: 872 | End: 2023-08-02
Attending: INTERNAL MEDICINE
Payer: COMMERCIAL

## 2023-08-02 LAB
ALBUMIN UR-MCNC: NEGATIVE MG/DL
ANION GAP SERPL CALCULATED.3IONS-SCNC: 9 MMOL/L (ref 7–15)
APPEARANCE UR: CLEAR
BILIRUB UR QL STRIP: NEGATIVE
BUN SERPL-MCNC: 14.9 MG/DL (ref 8–23)
CALCIUM SERPL-MCNC: 8.5 MG/DL (ref 8.8–10.2)
CHLORIDE SERPL-SCNC: 107 MMOL/L (ref 98–107)
COLOR UR AUTO: ABNORMAL
CREAT SERPL-MCNC: 1.13 MG/DL (ref 0.67–1.17)
DEPRECATED HCO3 PLAS-SCNC: 22 MMOL/L (ref 22–29)
ERYTHROCYTE [DISTWIDTH] IN BLOOD BY AUTOMATED COUNT: 12.9 % (ref 10–15)
GFR SERPL CREATININE-BSD FRML MDRD: 64 ML/MIN/1.73M2
GLUCOSE BLDC GLUCOMTR-MCNC: 155 MG/DL (ref 70–99)
GLUCOSE SERPL-MCNC: 89 MG/DL (ref 70–99)
GLUCOSE UR STRIP-MCNC: NEGATIVE MG/DL
HCT VFR BLD AUTO: 41.4 % (ref 40–53)
HGB BLD-MCNC: 13.6 G/DL (ref 13.3–17.7)
HGB UR QL STRIP: ABNORMAL
KETONES UR STRIP-MCNC: NEGATIVE MG/DL
LACTATE SERPL-SCNC: 1.8 MMOL/L (ref 0.7–2)
LACTATE SERPL-SCNC: 5.6 MMOL/L (ref 0.7–2)
LEUKOCYTE ESTERASE UR QL STRIP: NEGATIVE
MCH RBC QN AUTO: 31.2 PG (ref 26.5–33)
MCHC RBC AUTO-ENTMCNC: 32.9 G/DL (ref 31.5–36.5)
MCV RBC AUTO: 95 FL (ref 78–100)
NITRATE UR QL: NEGATIVE
PH UR STRIP: 6.5 [PH] (ref 5–7)
PLATELET # BLD AUTO: 170 10E3/UL (ref 150–450)
POTASSIUM SERPL-SCNC: 4.5 MMOL/L (ref 3.4–5.3)
PROCALCITONIN SERPL IA-MCNC: 10.94 NG/ML
RBC # BLD AUTO: 4.36 10E6/UL (ref 4.4–5.9)
RBC URINE: 17 /HPF
SODIUM SERPL-SCNC: 138 MMOL/L (ref 136–145)
SP GR UR STRIP: 1 (ref 1–1.03)
UROBILINOGEN UR STRIP-MCNC: NORMAL MG/DL
WBC # BLD AUTO: 8.6 10E3/UL (ref 4–11)
WBC URINE: 6 /HPF

## 2023-08-02 PROCEDURE — 250N000011 HC RX IP 250 OP 636: Mod: JZ

## 2023-08-02 PROCEDURE — 250N000013 HC RX MED GY IP 250 OP 250 PS 637

## 2023-08-02 PROCEDURE — 258N000003 HC RX IP 258 OP 636

## 2023-08-02 PROCEDURE — 84145 PROCALCITONIN (PCT): CPT | Performed by: INTERNAL MEDICINE

## 2023-08-02 PROCEDURE — 85027 COMPLETE CBC AUTOMATED: CPT | Performed by: INTERNAL MEDICINE

## 2023-08-02 PROCEDURE — 36415 COLL VENOUS BLD VENIPUNCTURE: CPT | Performed by: INTERNAL MEDICINE

## 2023-08-02 PROCEDURE — 81001 URINALYSIS AUTO W/SCOPE: CPT

## 2023-08-02 PROCEDURE — 250N000013 HC RX MED GY IP 250 OP 250 PS 637: Performed by: INTERNAL MEDICINE

## 2023-08-02 PROCEDURE — 250N000011 HC RX IP 250 OP 636: Performed by: INTERNAL MEDICINE

## 2023-08-02 PROCEDURE — 99291 CRITICAL CARE FIRST HOUR: CPT | Mod: 25

## 2023-08-02 PROCEDURE — 99233 SBSQ HOSP IP/OBS HIGH 50: CPT | Performed by: INTERNAL MEDICINE

## 2023-08-02 PROCEDURE — 120N000001 HC R&B MED SURG/OB

## 2023-08-02 PROCEDURE — 83605 ASSAY OF LACTIC ACID: CPT

## 2023-08-02 PROCEDURE — 87040 BLOOD CULTURE FOR BACTERIA: CPT | Performed by: INTERNAL MEDICINE

## 2023-08-02 PROCEDURE — 87086 URINE CULTURE/COLONY COUNT: CPT | Performed by: INTERNAL MEDICINE

## 2023-08-02 PROCEDURE — 71045 X-RAY EXAM CHEST 1 VIEW: CPT

## 2023-08-02 PROCEDURE — 250N000011 HC RX IP 250 OP 636

## 2023-08-02 PROCEDURE — 258N000003 HC RX IP 258 OP 636: Performed by: INTERNAL MEDICINE

## 2023-08-02 PROCEDURE — 250N000009 HC RX 250: Performed by: INTERNAL MEDICINE

## 2023-08-02 PROCEDURE — 83605 ASSAY OF LACTIC ACID: CPT | Performed by: INTERNAL MEDICINE

## 2023-08-02 PROCEDURE — 999N000128 HC STATISTIC PERIPHERAL IV START W/O US GUIDANCE

## 2023-08-02 PROCEDURE — 80048 BASIC METABOLIC PNL TOTAL CA: CPT | Performed by: INTERNAL MEDICINE

## 2023-08-02 PROCEDURE — 120N000013 HC R&B IMCU

## 2023-08-02 RX ORDER — SODIUM CHLORIDE, SODIUM LACTATE, POTASSIUM CHLORIDE, CALCIUM CHLORIDE 600; 310; 30; 20 MG/100ML; MG/100ML; MG/100ML; MG/100ML
INJECTION, SOLUTION INTRAVENOUS CONTINUOUS
Status: DISCONTINUED | OUTPATIENT
Start: 2023-08-02 | End: 2023-08-04

## 2023-08-02 RX ORDER — ACETAMINOPHEN 325 MG/1
650 TABLET ORAL EVERY 4 HOURS PRN
Status: DISCONTINUED | OUTPATIENT
Start: 2023-08-02 | End: 2023-08-07 | Stop reason: HOSPADM

## 2023-08-02 RX ORDER — PIPERACILLIN SODIUM, TAZOBACTAM SODIUM 4; .5 G/20ML; G/20ML
4.5 INJECTION, POWDER, LYOPHILIZED, FOR SOLUTION INTRAVENOUS EVERY 6 HOURS
Status: DISCONTINUED | OUTPATIENT
Start: 2023-08-02 | End: 2023-08-05

## 2023-08-02 RX ORDER — ALBUTEROL SULFATE 0.83 MG/ML
2.5 SOLUTION RESPIRATORY (INHALATION) EVERY 4 HOURS PRN
Status: DISCONTINUED | OUTPATIENT
Start: 2023-08-02 | End: 2023-08-07 | Stop reason: HOSPADM

## 2023-08-02 RX ORDER — MEPERIDINE HYDROCHLORIDE 25 MG/ML
12.5 INJECTION INTRAMUSCULAR; INTRAVENOUS; SUBCUTANEOUS ONCE
Status: COMPLETED | OUTPATIENT
Start: 2023-08-02 | End: 2023-08-02

## 2023-08-02 RX ADMIN — APIXABAN 5 MG: 5 TABLET, FILM COATED ORAL at 08:35

## 2023-08-02 RX ADMIN — SODIUM CHLORIDE: 9 INJECTION, SOLUTION INTRAVENOUS at 01:27

## 2023-08-02 RX ADMIN — SODIUM CHLORIDE, POTASSIUM CHLORIDE, SODIUM LACTATE AND CALCIUM CHLORIDE: 600; 310; 30; 20 INJECTION, SOLUTION INTRAVENOUS at 23:51

## 2023-08-02 RX ADMIN — MEMANTINE 10 MG: 10 TABLET ORAL at 20:19

## 2023-08-02 RX ADMIN — PIPERACILLIN AND TAZOBACTAM 4.5 G: 4; .5 INJECTION, POWDER, FOR SOLUTION INTRAVENOUS at 23:47

## 2023-08-02 RX ADMIN — PIPERACILLIN AND TAZOBACTAM 4.5 G: 4; .5 INJECTION, POWDER, FOR SOLUTION INTRAVENOUS at 18:43

## 2023-08-02 RX ADMIN — APIXABAN 5 MG: 5 TABLET, FILM COATED ORAL at 20:19

## 2023-08-02 RX ADMIN — SODIUM CHLORIDE, POTASSIUM CHLORIDE, SODIUM LACTATE AND CALCIUM CHLORIDE 2000 ML: 600; 310; 30; 20 INJECTION, SOLUTION INTRAVENOUS at 12:45

## 2023-08-02 RX ADMIN — SODIUM CHLORIDE, POTASSIUM CHLORIDE, SODIUM LACTATE AND CALCIUM CHLORIDE: 600; 310; 30; 20 INJECTION, SOLUTION INTRAVENOUS at 14:57

## 2023-08-02 RX ADMIN — ACETAMINOPHEN 650 MG: 325 TABLET, FILM COATED ORAL at 12:08

## 2023-08-02 RX ADMIN — PIPERACILLIN AND TAZOBACTAM 4.5 G: 4; .5 INJECTION, POWDER, FOR SOLUTION INTRAVENOUS at 12:12

## 2023-08-02 RX ADMIN — MEPERIDINE HYDROCHLORIDE 12.5 MG: 25 INJECTION INTRAMUSCULAR; INTRAVENOUS; SUBCUTANEOUS at 11:28

## 2023-08-02 RX ADMIN — LEVOTHYROXINE SODIUM 50 MCG: 50 TABLET ORAL at 08:35

## 2023-08-02 RX ADMIN — ALBUTEROL SULFATE 2.5 MG: 2.5 SOLUTION RESPIRATORY (INHALATION) at 11:08

## 2023-08-02 RX ADMIN — SODIUM CHLORIDE: 9 INJECTION, SOLUTION INTRAVENOUS at 07:03

## 2023-08-02 RX ADMIN — PRAVASTATIN SODIUM 30 MG: 20 TABLET ORAL at 20:19

## 2023-08-02 RX ADMIN — VANCOMYCIN HYDROCHLORIDE 1500 MG: 10 INJECTION, POWDER, LYOPHILIZED, FOR SOLUTION INTRAVENOUS at 13:10

## 2023-08-02 RX ADMIN — DONEPEZIL HYDROCHLORIDE 10 MG: 10 TABLET ORAL at 20:19

## 2023-08-02 ASSESSMENT — ACTIVITIES OF DAILY LIVING (ADL)
ADLS_ACUITY_SCORE: 33
ADLS_ACUITY_SCORE: 35
ADLS_ACUITY_SCORE: 33
ADLS_ACUITY_SCORE: 35
ADLS_ACUITY_SCORE: 37
ADLS_ACUITY_SCORE: 35
ADLS_ACUITY_SCORE: 39
ADLS_ACUITY_SCORE: 33
ADLS_ACUITY_SCORE: 37
ADLS_ACUITY_SCORE: 35
ADLS_ACUITY_SCORE: 35
ADLS_ACUITY_SCORE: 33

## 2023-08-02 NOTE — PROGRESS NOTES
Orientation/Cognitive: Disoriented to time and place.  Observation Goals (Met/ Not Met): Not Met  Mobility Level/Assist Equipment: bed Rest  Fall Risk (Y/N): yes  Behavior Concerns: None  Pain Management: denies  Tele/VS/O2: VSS On RA  ABNL Lab/BG: Refer to lab results  Diet: Reg  Bowel/Bladder: Incontinent  Skin Concerns: Scattered bruises On his back, Small redness on his buttocks  Drains/Devices:NS Infusing  Tests/Procedures for next shift: refer to lab results  Anticipated DC date & active delays: TBD  Patient Stated Goal for Today: Not Stated

## 2023-08-02 NOTE — CODE/RAPID RESPONSE
"[facility]    Sepsis Evaluation Progress Note    Date of Service: 08/02/2023    I was called to see Fredrick Mehta due to  lactic acid 5.6 . He is not known to have an infection.     Physical Exam    Vital Signs:  Temp: 98.3  F (36.8  C) Temp src: Oral BP: 136/61 Pulse: 99   Resp: 20 SpO2: 91 % O2 Device: None (Room air)      Lab:  Lactic Acid   Date Value Ref Range Status   08/02/2023 5.6 (HH) 0.7 - 2.0 mmol/L Final       The patient baseline mental status.    The rest of their physical exam is significant for odorous urine.    Assessment and Plan    Septic Shock 2/2 unknown source  Prior to my arrival patient evaluated by Dr. Pedersen earlier this morning and appreciated patient had rigors.  Patient was given 12.5 mg IV Demerol.  Diagnostic work-up including peripheral blood cultures and lactic acid were obtained.  RRT called after lactic acid resulted at 5.6.  Upon arrival patient is nontoxic-appearing, not in acute distress.  Initial vital signs include oral temp 98.3, HR 99, /61, RR 20, SPO2 90% on room air.  Patient endorses general malaise.  He reports feeling \"unwell\", however unable to elaborate.  He denies any pain, fever, chills, body aches, dysuria, urinary urgency or frequency, nausea, vomiting, diarrhea, abdominal pain, headache, light sensitivity, or open skin wounds.  On exam skin is warm and dry.  Lungs are clear bilaterally. Abdomen is soft, non-tender, without any rebound tenderness or gaurding. No open wounds or signs of cellulitis on skin exam. Rectal temp of 102.1.    CXR does not show any opacities or consolidations concerning for pneumonia. Patient had UA yesterday, which showed small leukocyte esterase, and 10 WBCs, urine culture was not indicated. RN did report patient's urine is odorous and concentrated appearing today, which may represent a UTI.    qSOFA  Altered mental status- GCS < 15:  Respiratory rate greater than or equal to 22:  Systolic BP less than or " "equal to:    The SIRS criteria and exam findings are likely due to Fredrick Mehta meets SIRS criteria AND has a lactate >=4 or hypotension despite volume resuscitation with 30 ml/kg crystalloid.  These vital signs, lab and physical exam findings constitute a diagnosis of SEPTIC SHOCK, based on: Lactate resulted, and the level was >=4    Interventions:  - LR bolus at 30 ml/kg  - Vancomycin  - Zosyn  - UA/UC  - Repeat lactic acid at 1430  - 650 mg acetaminophen  - [place] [facility] [place] [facility] [place]    Discussed and defer further cares to primary Hospitalist Dr. Pedersen.        Anti-infectives (From now, onward)      Start     Dose/Rate Route Frequency Ordered Stop    08/02/23 1200  piperacillin-tazobactam (ZOSYN) 4.5 g vial to attach to  mL bag        Note to Pharmacy: For [facility], [facility] and [facility]: For Zosyn-naive patients, use the \"Zosyn initial dose + extended infusion\" order panel.    4.5 g  over 30 Minutes Intravenous EVERY 6 HOURS 08/02/23 1156            Current antibiotic coverage requires additional antibiotics for unknown source.    3 Hour Severe Sepsis Bundle Completion:  1. Initial Lactic Acid result shown above. Repeat lactic acid ordered by reflex for 3 hours from initial collection.   2. Blood Cultures before Antibiotics: Yes  3. Broad Spectrum Antibiotics Administered: yes  4. Fluids: Full 30 mL/kg bolus given (see amount below).    BMI Readings from Last 1 Encounters:   08/01/23 22.38 kg/m      30 mL/kg fluids based on weight: 2,180 mL  30 mL/kg fluids based on IBW (must be >= 60 inches tall): 2,260 mL.    ID: The patient is currently on the following antibiotics:  Anti-infectives (From now, onward)      None          Current antibiotic coverage requires additional antibiotics for unknown source.    Fluid:  2,000 mL fluids ORDERED to be given , followed by maintenance at 100 ml/hr.     Lab: Repeat lactic acid ordered by reflex for 3 hours from initial collection.    Disposition: The patient will be " transferred [place] [facility]    Shahid Mahmood NP  [facility]  Securely message with the [employer] Web Console (learn more here)  Text page via [employer] Paging/Directory    Time Spent on this Encounter   I spent 30 minutes (11:40 - 12:10) of critical care time on the unit/floor managing the care of Fredrick Mehta. Upon evaluation, this patient had a high probability of imminent or life-threatening deterioration due to Septic Shock, which required my direct attention, intervention, and personal management listed in this note. 100% of my time was spent at the bedside counseling the patient and/or coordinating care regarding services listed in this note.

## 2023-08-02 NOTE — PLAN OF CARE
A/O to self, forgetful. VSS on RA- orthostatics positive this AM. Tele SR. Up A/1-2 GB/W. Incontinent of B/B. Regular diet.     1040 pt with rigors/tachypnea. Hospitalist ordered chest x-ray, BC, and stat labs. Exp wheeze noted- albuterol neb given. Demerol given for Rigors. Lactic back as 5.6, RRT called. See RRT note. IV team called to place PIV x 2. Fluids and Abx started. Bladder scan 80. UA ordered. Transfer to , report called. All belongings sent with patient.

## 2023-08-02 NOTE — UTILIZATION REVIEW
"  Admission Status; Secondary Review Determination         Under the authority of the Utilization Management Committee, the utilization review process indicated a secondary review on the above patient.  The review outcome is based on review of the medical records, discussions with staff, and applying clinical experience noted on the date of the review.        (X)      Inpatient Status Appropriate - This patient's medical care is consistent with medical management for inpatient care and reasonable inpatient medical practice.      () Observation Status Appropriate - This patient does not meet hospital inpatient criteria and is placed in observation status. If this patient's primary payer is Medicare and was admitted as an inpatient, Condition Code 44 should be used and patient status changed to \"observation\".   () Admission Status NOT Appropriate - This patient's medical care is not consistent with medical management for Inpatient or Observation Status.          RATIONALE FOR DETERMINATION     82 year old male with history of Alzheimer's dementia who presented for evaluation of dizziness, nausea, and difficulty with ambulation.  Creatinine was elevated, but at his baseline.  Patient had vomiting and was given IV antiemetics.  He continues to have difficulty with standing secondary to dizziness.  He will have PT and OT assessments.  He is receiving IV fluids.  He will require continued hospitalization and is appropriate for inpatient status.    The severity of illness, intensity of service provided, expected LOS and risk for adverse outcome make the care complex, high risk and appropriate for hospital admission.        The information on this document is developed by the utilization review team in order for the business office to ensure compliance.  This only denotes the appropriateness of proper admission status and does not reflect the quality of care rendered.         The definitions of Inpatient Status and " Observation Status used in making the determination above are those provided in the CMS Coverage Manual, Chapter 1 and Chapter 6, section 70.4.      Sincerely,     Hugo Rodriguez MD  Physician Advisor  Utilization Review/ Case Management  [facility].

## 2023-08-02 NOTE — PLAN OF CARE
Goal Outcome Evaluation:      Plan of Care Reviewed With: patient    Overall Patient Progress: improvingOverall Patient Progress: improving     Orientations: Disoriented to time and situation. Forgetful (hx of dementia)  Vitals/Pain: VSS on 2L NC  fever of 102.1 in ER and 100.1 upon admit to floor.  No pain complaints   Tele: SR   Lines/Drains: 2PIVs in R forearm, LR running at 100ml/hr  Skin/Wounds: nonblanchable redness on sacrum, WOC consulted  GI/: Inc for stool and urine, pads in place  Labs: Abnormal/Trends, Electrolyte Replacement- Lactate 5.6 decreased to 1.8 from redraw at 1830, UA obtained, blood cultures pending.    Ambulation/Assist: A1 GBW  Plan:  continue IV antbx, monitor fever and orthostatic BPs

## 2023-08-02 NOTE — PHARMACY-VANCOMYCIN DOSING SERVICE
"Pharmacy Vancomycin Initial Note  Date of Service 2023  Patient's  1940  82 year old, male    Indication: Sepsis    Current estimated CrCl = Estimated Creatinine Clearance: 51 mL/min (based on SCr of 1.13 mg/dL).    Creatinine for last 3 days  2023:  1:20 PM Creatinine 1.35 mg/dL  2023:  6:17 AM Creatinine 1.13 mg/dL    Recent Vancomycin Level(s) for last 3 days  No results found for requested labs within last 3 days.      Vancomycin IV Administrations (past 72 hours)        No vancomycin orders with administrations in past 72 hours.                    Nephrotoxins and other renal medications (From now, onward)      Start     Dose/Rate Route Frequency Ordered Stop    23 1300  vancomycin (VANCOCIN) 1,250 mg in 0.9% NaCl 250 mL intermittent infusion         1,250 mg  over 90 Minutes Intravenous EVERY 24 HOURS 23 1219      23 1300  vancomycin (VANCOCIN) 1,500 mg in 0.9% NaCl 250 mL intermittent infusion         1,500 mg  over 90 Minutes Intravenous ONCE 23 1216      23 1200  piperacillin-tazobactam (ZOSYN) 4.5 g vial to attach to  mL bag        Note to Pharmacy: For [facility], [facility] and [facility]: For Zosyn-naive patients, use the \"Zosyn initial dose + extended infusion\" order panel.    4.5 g  over 30 Minutes Intravenous EVERY 6 HOURS 23 1156              Contrast Orders - past 72 hours (72h ago, onward)      Start     Dose/Rate Route Frequency Stop    23 1825  gadobutrol (GADAVIST) injection 10 mL         10 mL Intravenous ONCE 23 1824            [employer] Prediction of Planned Initial Vancomycin Regimen  Loading dose: 1500 mg at 12:30 2023.  Regimen: 1250 mg IV every 24 hours.  Start time: 12:19 on 2023  Exposure target: AUC24 (range)400-600 mg/L.hr   AUC24,ss: 535 mg/L.hr  Probability of AUC24 > 400: 80 %  Ctrough,ss: 16.3 mg/L  Probability of Ctrough,ss > 20: 32 %  Probability of nephrotoxicity (Lodise ADENIKE ): 12 %        Plan:  Start " vancomycin  1500mg IV x1 then 1250mg q24h..   Vancomycin monitoring method: AUC  Vancomycin therapeutic monitoring goal: 400-600 mg*h/L  Pharmacy will check vancomycin levels as appropriate in 1-3 Days.    Serum creatinine levels will be ordered a minimum of twice weekly.      Britt Negron [facility]

## 2023-08-02 NOTE — PROGRESS NOTES
[facility]    Medicine Progress Note - Hospitalist Service    Date of Admission:  8/1/2023    Assessment & Plan   Fredrick Mehta is a 82 year old male with history of Alzheimer's dementia, HTN, paroxysmal Afib who presented to the ED with his wife for evaluation of dizziness. Initial work-up was unremarkable; imaging of the head was negative; earlier today 8/2 he started having rigors and RRT called.    Rule out sepsis, unclear etiology  - He presented with dizziness; blood work on admission showed CBC- WNL.  Lactic acid 1; creatinine 1.35; LFTs WNL  - UA showed l red blood cells more than 182, white blood cells of 10, small leukocyte esterase; urine culture not done  - Afebrile on admission  - Noted to have rigors earlier today; RRT called; noted to have rectal temperature of 102.1  - Blood cultures ordered  - Demerol 12.5 mg IV x1 given  - Lactic acid returned back elevated at 5.6; CBC with normal white blood cells  - Chest x-ray-no infiltrates  - Procalcitonin ordered  - Appreciate house LARA interventions  - Received bolus 2 L lactated Ringer  - started empirically on Zosyn and Vanco  - Transfer to [facility] [place]  - Check UA/urine culture  - Repeat lactic acid  - Monitor fever curve and white blood cells trend  - Monitor blood cultures    2. Dizziness  - Initially thought related to possible orthostatic hypotension but possible related to possible sepsis as above  - CT head-negative  - MRI brain, MRA brain and neck with no acute pathology  -Treat as above  - fall precautions  - PT/OT    3.  Paroxysmal A-fib  - EKG on admission with sinus bradycardia at the rate of 56 bpm  - PTA on Cardizem  mg p.o. twice daily-held on admission  - Resume prior to admission Eliquis    4. Hypothyroidism  - Prior to admission levothyroxine    5. Alzheimer dementia  - Resume prior to admission Namenda and Aricept  - PT/OT eval    6.  Dyslipidemia  - Resume prior to admission statin    7.  CKD stage III  - Baseline  creatinine between 1.3-1.5  - Creatinine on admission 1.36, today 1.13  - BMP in a.m.     Diet: Regular Diet Adult    DVT Prophylaxis: DOAC  [place] Catheter: Not present  Lines: None     Cardiac Monitoring: ACTIVE order. Indication: Bradycardias (48 hours)  Code Status: Full Code      Clinically Significant Risk Factors Present on Admission               # Drug Induced Coagulation Defect: home medication list includes an anticoagulant medication                  Disposition Plan      Expected Discharge Date: 08/04/2023        Discharge Comments: PT and OT eval  tele          Ayse Pedersen MD  Hospitalist Service  [facility]  Securely message with [employer] (more info)  Text page via [facility] Paging/Directory   ______________________________________________________________________    Interval History   Noted to have rigors in the morning; looked very uncomfortable  -The wife was present in the room; she stated that he looked fine when she arrived but he started having shaking chills soon thereafter  -He was noted to have some wheezing;   - denied shortness of breath, no coughing, no chest pain, no nausea, no vomiting  - No abdominal pain  -Discussed with wife, bedside RN, house LARA    Physical Exam   Vital Signs: Temp: 98.3  F (36.8  C) Temp src: Oral BP: 136/61 Pulse: 99   Resp: 20 SpO2: 91 % O2 Device: None (Room air)    Weight: 160 lbs 7.92 oz    GENERAL: having rigors. Looks uncomfortable   HEENT: Anicteric sclera. PERRL  CV: RRR. S1, S2. No murmurs appreciated.   RESPIRATORY: bilateral air entry, scattered wheezing, no crackles, no rales  GI: soft, understanding bowel sounds are active.  MUSCULOSKELETAL: No joint swelling or tenderness. Moves all extremities.   NEUROLOGICAL: No focal deficits.NO any motor deficits noted  EXTREMITIES: No peripheral edema. Intact bilateral pedal pulses.   SKIN: No jaundice. No rashes.     Medical Decision Making       55 MINUTES SPENT BY ME on the date of  service doing chart review, history, exam, documentation & further activities per the note.      Data     I have personally reviewed the following data over the past 24 hrs:    8.6  \   13.6   / 170     138 107 14.9 /  155 (H)   4.5 22 1.13 \     Procal: N/A CRP: N/A Lactic Acid: 5.6 (HH)         Imaging results reviewed over the past 24 hrs:   Recent Results (from the past 24 hour(s))   MRA Neck (Carotids) wo & w Contrast    Narrative    EXAM: MR BRAIN W/O and W CONTRAST, MRA NECK (CAROTIDS) W/O and W CONTRAST  LOCATION: [facility]  DATE: 8/1/2023    INDICATION: persistent dizziness  COMPARISON: CT head 08/01/2023  CONTRAST: 10 mL Gadavist  TECHNIQUE:   1) Routine multiplanar multisequence head MRI without and with intravenous contrast.  2) 3D time-of-flight head MRA without intravenous contrast.  3) Neck MRA without and with IV contrast. Stenosis measurements made according to NASCET criteria unless otherwise specified.    FINDINGS:  HEAD MRI:  INTRACRANIAL CONTENTS: No acute or subacute infarct. No mass, acute hemorrhage, or extra-axial fluid collections. Scattered nonspecific T2/FLAIR hyperintensities within the cerebral white matter most consistent with mild chronic microvascular ischemic   change. Moderate generalized cerebral atrophy. No hydrocephalus. Atrophy is more prominent in hippocampal regions, more so on right. This is a nonspecific finding, similar finding could be observed in dementia of Alzheimer's type. Normal position of the   cerebellar tonsils. No pathologic contrast enhancement.    SELLA: No abnormality accounting for technique.    OSSEOUS STRUCTURES/SOFT TISSUES: Normal marrow signal. The major intracranial vascular flow voids are maintained.     ORBITS: No abnormality accounting for technique.     SINUSES/MASTOIDS: No paranasal sinus mucosal disease. No middle ear or mastoid effusion.     HEAD MRA:   ANTERIOR CIRCULATION: No stenosis/occlusion, aneurysm, or high  flow vascular malformation. Standard [place] anatomy.    POSTERIOR CIRCULATION: No stenosis/occlusion, aneurysm, or high flow vascular malformation. Dominant left and smaller right vertebral artery contribute to a normal basilar artery.     NECK MRA:   RIGHT CAROTID: No measurable stenosis or dissection.    LEFT CAROTID: No measurable stenosis or dissection.    VERTEBRAL ARTERIES: No focal stenosis or dissection. Dominant left and smaller right vertebral arteries.    AORTIC ARCH: Classic aortic arch anatomy with no significant stenosis at the origin of the great vessels.      Impression    IMPRESSION:  HEAD MRI:  1.  No acute infarct, mass, mass effect, or hemorrhage.  2.  Mild chronic small vessel ischemia.  3.  Moderate atrophy.    HEAD MRA:  Normal MRA [place].    NECK MRA:  Normal neck MRA.     MR Brain w/o & w Contrast    Narrative    EXAM: MR BRAIN W/O and W CONTRAST, MRA NECK (CAROTIDS) W/O and W CONTRAST  LOCATION: [facility]  DATE: 8/1/2023    INDICATION: persistent dizziness  COMPARISON: CT head 08/01/2023  CONTRAST: 10 mL Gadavist  TECHNIQUE:   1) Routine multiplanar multisequence head MRI without and with intravenous contrast.  2) 3D time-of-flight head MRA without intravenous contrast.  3) Neck MRA without and with IV contrast. Stenosis measurements made according to NASCET criteria unless otherwise specified.    FINDINGS:  HEAD MRI:  INTRACRANIAL CONTENTS: No acute or subacute infarct. No mass, acute hemorrhage, or extra-axial fluid collections. Scattered nonspecific T2/FLAIR hyperintensities within the cerebral white matter most consistent with mild chronic microvascular ischemic   change. Moderate generalized cerebral atrophy. No hydrocephalus. Atrophy is more prominent in hippocampal regions, more so on right. This is a nonspecific finding, similar finding could be observed in dementia of Alzheimer's type. Normal position of the   cerebellar tonsils. No  pathologic contrast enhancement.    SELLA: No abnormality accounting for technique.    OSSEOUS STRUCTURES/SOFT TISSUES: Normal marrow signal. The major intracranial vascular flow voids are maintained.     ORBITS: No abnormality accounting for technique.     SINUSES/MASTOIDS: No paranasal sinus mucosal disease. No middle ear or mastoid effusion.     HEAD MRA:   ANTERIOR CIRCULATION: No stenosis/occlusion, aneurysm, or high flow vascular malformation. Standard [place] anatomy.    POSTERIOR CIRCULATION: No stenosis/occlusion, aneurysm, or high flow vascular malformation. Dominant left and smaller right vertebral artery contribute to a normal basilar artery.     NECK MRA:   RIGHT CAROTID: No measurable stenosis or dissection.    LEFT CAROTID: No measurable stenosis or dissection.    VERTEBRAL ARTERIES: No focal stenosis or dissection. Dominant left and smaller right vertebral arteries.    AORTIC ARCH: Classic aortic arch anatomy with no significant stenosis at the origin of the great vessels.      Impression    IMPRESSION:  HEAD MRI:  1.  No acute infarct, mass, mass effect, or hemorrhage.  2.  Mild chronic small vessel ischemia.  3.  Moderate atrophy.    HEAD MRA:  Normal MRA [place].    NECK MRA:  Normal neck MRA.     XR Chest Port 1 View    Narrative    CHEST ONE VIEW  8/2/2023 11:25 AM     HISTORY: Rigors.    COMPARISON: July 16, 2022      Impression    IMPRESSION: No acute disease.     MARINA HURT MD         SYSTEM ID:  [id number]

## 2023-08-02 NOTE — PLAN OF CARE
Observation Goals:  -diagnostic tests and consults completed and resulted - not met, PT consult  -vital signs normal or at patient baseline - met  -safe disposition plan has been identified - not met

## 2023-08-03 ENCOUNTER — APPOINTMENT (OUTPATIENT)
Dept: PHYSICAL THERAPY | Facility: CLINIC | Age: 83
DRG: 872 | End: 2023-08-03
Attending: INTERNAL MEDICINE
Payer: COMMERCIAL

## 2023-08-03 LAB
ANION GAP SERPL CALCULATED.3IONS-SCNC: 9 MMOL/L (ref 7–15)
BUN SERPL-MCNC: 18.4 MG/DL (ref 8–23)
CALCIUM SERPL-MCNC: 8.2 MG/DL (ref 8.8–10.2)
CHLORIDE SERPL-SCNC: 107 MMOL/L (ref 98–107)
CREAT SERPL-MCNC: 1.25 MG/DL (ref 0.67–1.17)
DEPRECATED HCO3 PLAS-SCNC: 23 MMOL/L (ref 22–29)
ERYTHROCYTE [DISTWIDTH] IN BLOOD BY AUTOMATED COUNT: 12.9 % (ref 10–15)
GFR SERPL CREATININE-BSD FRML MDRD: 57 ML/MIN/1.73M2
GLUCOSE SERPL-MCNC: 97 MG/DL (ref 70–99)
HCT VFR BLD AUTO: 33.6 % (ref 40–53)
HGB BLD-MCNC: 11.4 G/DL (ref 13.3–17.7)
MCH RBC QN AUTO: 31.7 PG (ref 26.5–33)
MCHC RBC AUTO-ENTMCNC: 33.9 G/DL (ref 31.5–36.5)
MCV RBC AUTO: 93 FL (ref 78–100)
PLATELET # BLD AUTO: 129 10E3/UL (ref 150–450)
POTASSIUM SERPL-SCNC: 3.8 MMOL/L (ref 3.4–5.3)
RBC # BLD AUTO: 3.6 10E6/UL (ref 4.4–5.9)
SODIUM SERPL-SCNC: 139 MMOL/L (ref 136–145)
WBC # BLD AUTO: 7.1 10E3/UL (ref 4–11)

## 2023-08-03 PROCEDURE — 250N000013 HC RX MED GY IP 250 OP 250 PS 637: Performed by: INTERNAL MEDICINE

## 2023-08-03 PROCEDURE — 250N000011 HC RX IP 250 OP 636: Mod: JZ

## 2023-08-03 PROCEDURE — 258N000003 HC RX IP 258 OP 636

## 2023-08-03 PROCEDURE — 120N000013 HC R&B IMCU

## 2023-08-03 PROCEDURE — 99232 SBSQ HOSP IP/OBS MODERATE 35: CPT | Performed by: INTERNAL MEDICINE

## 2023-08-03 PROCEDURE — 97116 GAIT TRAINING THERAPY: CPT | Mod: GP

## 2023-08-03 PROCEDURE — 85027 COMPLETE CBC AUTOMATED: CPT | Performed by: INTERNAL MEDICINE

## 2023-08-03 PROCEDURE — G0463 HOSPITAL OUTPT CLINIC VISIT: HCPCS

## 2023-08-03 PROCEDURE — 36415 COLL VENOUS BLD VENIPUNCTURE: CPT | Performed by: INTERNAL MEDICINE

## 2023-08-03 PROCEDURE — 80048 BASIC METABOLIC PNL TOTAL CA: CPT | Performed by: INTERNAL MEDICINE

## 2023-08-03 PROCEDURE — 97161 PT EVAL LOW COMPLEX 20 MIN: CPT | Mod: GP

## 2023-08-03 RX ORDER — VANCOMYCIN HYDROCHLORIDE 1 G/200ML
1000 INJECTION, SOLUTION INTRAVENOUS EVERY 24 HOURS
Status: DISCONTINUED | OUTPATIENT
Start: 2023-08-03 | End: 2023-08-03

## 2023-08-03 RX ORDER — LATANOPROST 50 UG/ML
1 SOLUTION/ DROPS OPHTHALMIC AT BEDTIME
Status: DISCONTINUED | OUTPATIENT
Start: 2023-08-03 | End: 2023-08-07 | Stop reason: HOSPADM

## 2023-08-03 RX ADMIN — LATANOPROST 1 DROP: 50 SOLUTION/ DROPS OPHTHALMIC at 21:30

## 2023-08-03 RX ADMIN — PIPERACILLIN AND TAZOBACTAM 4.5 G: 4; .5 INJECTION, POWDER, FOR SOLUTION INTRAVENOUS at 11:48

## 2023-08-03 RX ADMIN — APIXABAN 5 MG: 5 TABLET, FILM COATED ORAL at 08:52

## 2023-08-03 RX ADMIN — SODIUM CHLORIDE, POTASSIUM CHLORIDE, SODIUM LACTATE AND CALCIUM CHLORIDE: 600; 310; 30; 20 INJECTION, SOLUTION INTRAVENOUS at 09:52

## 2023-08-03 RX ADMIN — APIXABAN 5 MG: 5 TABLET, FILM COATED ORAL at 21:30

## 2023-08-03 RX ADMIN — MEMANTINE 10 MG: 10 TABLET ORAL at 20:44

## 2023-08-03 RX ADMIN — PRAVASTATIN SODIUM 30 MG: 20 TABLET ORAL at 20:44

## 2023-08-03 RX ADMIN — VANCOMYCIN HYDROCHLORIDE 1000 MG: 1 INJECTION, SOLUTION INTRAVENOUS at 10:11

## 2023-08-03 RX ADMIN — DONEPEZIL HYDROCHLORIDE 10 MG: 10 TABLET ORAL at 20:44

## 2023-08-03 RX ADMIN — PIPERACILLIN AND TAZOBACTAM 4.5 G: 4; .5 INJECTION, POWDER, FOR SOLUTION INTRAVENOUS at 05:20

## 2023-08-03 RX ADMIN — LEVOTHYROXINE SODIUM 50 MCG: 50 TABLET ORAL at 08:52

## 2023-08-03 RX ADMIN — PIPERACILLIN AND TAZOBACTAM 4.5 G: 4; .5 INJECTION, POWDER, FOR SOLUTION INTRAVENOUS at 17:24

## 2023-08-03 ASSESSMENT — ACTIVITIES OF DAILY LIVING (ADL)
ADLS_ACUITY_SCORE: 35
WALKING_OR_CLIMBING_STAIRS_DIFFICULTY: NO
WEAR_GLASSES_OR_BLIND: YES
TOILETING_ISSUES: YES
HEARING_DIFFICULTY_OR_DEAF: NO
ADLS_ACUITY_SCORE: 41
ADLS_ACUITY_SCORE: 41
DIFFICULTY_EATING/SWALLOWING: NO
DIFFICULTY_COMMUNICATING: NO
DRESSING/BATHING: BATHING DIFFICULTY, ASSISTANCE 1 PERSON
ADLS_ACUITY_SCORE: 41
DRESSING/BATHING_DIFFICULTY: NO
ADLS_ACUITY_SCORE: 41
ADLS_ACUITY_SCORE: 43
DOING_ERRANDS_INDEPENDENTLY_DIFFICULTY: YES
ADLS_ACUITY_SCORE: 39
TOILETING_ASSISTANCE: TOILETING DIFFICULTY, ASSISTANCE 1 PERSON
ADLS_ACUITY_SCORE: 45
ADLS_ACUITY_SCORE: 41
CONCENTRATING,_REMEMBERING_OR_MAKING_DECISIONS_DIFFICULTY: YES
ADLS_ACUITY_SCORE: 39
ADLS_ACUITY_SCORE: 45
ADLS_ACUITY_SCORE: 45
PATIENT'S_PREFERRED_MEANS_OF_COMMUNICATION: VERBAL

## 2023-08-03 NOTE — CONSULTS
"[facility]  WO Nurse Inpatient Assessment     Consulted for:  Coccyx    Summary:  Coccyx basically clear and intact today 8/3, only noted fading erythema and scant maceration.  [facility] will place PIP recommendations and sign off.     Patient History (according to provider note(s):      Fredrick Mehta is a 82 year old male with history of Alzheimer's dementia, HTN, paroxysmal Afib who presented to the ED with his wife for evaluation of dizziness. Initial work-up was unremarkable; imaging of the head was negative; earlier today 8/2 he started having rigors and RRT called.     Areas Assessed:      Areas visualized during today's visit: Sacrum/coccyx    Location:  Coccyx    Last photo: 8-3-23      Due to: friction, moisture, pressure  History/plan of care: pt has been dizzy and weak recently, able to turn in bed but needs assist x 1 for commode etc  Skin:  appears mostly intact with scant maceration and peeling of epidermis, mild blanchable erythema to coccyx/inner buttocks     Palpation: normal and firm      Drainage: none  Periwound skin: Intact, Dry/scaly, and Erythema- blanchable      Color: pink      Temperature: normal   Odor: none  Pain: denies   Pain interventions prior to dressing change: no significant pain present   Treatment goal: Protection  STATUS: initial assessment and intact  Supplies ordered: supplies stored on unit       Treatment Plan:     Pressure Injury Prevention (PIP) Plan:  -If patient is declining pressure injury prevention interventions: Explore reason why and address patient's concerns, Educate on pressure injury risk and prevention intervention(s), If patient is still declining, document \"informed refusal\" , and Ensure Care team is aware ( provider, charge nurse, etc)  -Mattress: Isoflex; add DEDE pump if moisture issues  -HOB: Maintain at or below 30 degrees, unless contraindicated  -Repositioning in bed: Every 1-2 hours , Left/right positioning; avoid supine, and Raise " foot of bed prior to raising head of bed, to reduce patient sliding down (shear)  -Heels: Keep elevated off mattress and Pillows under calves  -Protective Dressing: Sacral Mepilex for prevention (#[id number]),  especially for the agitated patient   -Chair positioning: Repositions self: patient to shift weight every 15 minutes and Assist patient to reposition hourly   -Moisture Management: Perineal cleansing /protection: Follow Incontinence Protocol, Avoid brief in bed, Clean and dry skin folds with bathing , and Moisturize dry skin  -Under Devices: Inspect skin under all medical devices during skin inspection , Ensure tubes are stabilized without tension, and Ensure patient is not lying on medical devices or equipment when repositioned        Orders: Written    RECOMMEND PRIMARY TEAM ORDER: None, at this time  Education provided: importance of repositioning, plan of care, and Off-loading pressure  Discussed plan of care with: Patient, Family, and Nurse  WOC nurse follow-up plan: signing off  Notify WOC if wound(s) deteriorate.  Nursing to notify the Provider(s) and re-consult the WOC Nurse if new skin concern.    DATA:     Current support surface: Standard  Standard gel/foam mattress (IsoFlex, Atmos air, etc)  Containment of urine/stool: Incontinence Protocol  BMI: Body mass index is 22.38 kg/m .   Active diet order: Orders Placed This Encounter      Regular Diet Adult     Output: I/O last 3 completed shifts:  In: 1785 [P.O.:280; I.V.:1505]  Out: 1950 [Urine:1950]     Labs:   Recent Labs   Lab 08/03/23  0510 08/02/23  1118 08/01/23  1320   ALBUMIN  --   --  4.1   HGB 11.4*   < > 14.8   WBC 7.1   < > 7.4    < > = values in this interval not displayed.     Pressure injury risk assessment:   Sensory Perception: 4-->no impairment  Moisture: 3-->occasionally moist  Activity: 3-->walks occasionally  Mobility: 3-->slightly limited  Nutrition: 3-->adequate  Friction and Shear: 2-->potential problem  Mau Score: 18    Mee  TEMO Ferguson CWOCN  -Securely message with [employer] ([facility] [employer])   -[facility] Office Phone: [phone] (messages checked periodically Mon-Fri 8a-4p)

## 2023-08-03 NOTE — PROGRESS NOTES
08/03/23 1636   Appointment Info   Signing Clinician's Name / Credentials (PT) Brittany Dressler, PT   Quick Adds   Quick Adds Vestibular Eval   Living Environment   People in Home spouse  (who doesn't use an AD per pt)   Current Living Arrangements house   Home Accessibility stairs to enter home;stairs within home   Number of Stairs, Main Entrance 3;4   Stair Railings, Main Entrance railings safe and in good condition   Number of Stairs, Within Home, Primary greater than 10 stairs   Stair Railings, Within Home, Primary railings safe and in good condition   Living Environment Comments Bed/bath upstairs.  Per pt, following care transition notes given AMS.   Self-Care   Usual Activity Tolerance fair   Current Activity Tolerance poor   Equipment Currently Used at Home none   Fall history within last six months no   Activity/Exercise/Self-Care Comment Ind no AD, no falls nor close calls.   General Information   Onset of Illness/Injury or Date of Surgery 08/01/23   Referring Physician Eusebia Serrano MD   Patient/Family Therapy Goals Statement (PT) To be well.   Pertinent History of Current Problem (include personal factors and/or comorbidities that impact the POC) Septic shock with rigors, dizziness, AMS (orthostasis negative), Alz dementia, CKD3, HTN, Afib.   Existing Precautions/Restrictions fall   Cognition   Affect/Mental Status (Cognition) confused   Orientation Status (Cognition) person   Follows Commands (Cognition) follows one-step commands   Memory Deficit (Cognition) short-term memory   Cognitive Status Comments Repeat edu on telepack, pt repeatedly surprised he ended up back in his room by the end.   Pain Assessment   Patient Currently in Pain No   Posture    Posture Comments Impaired stability, controlled mobility   Range of Motion (ROM)   ROM Comment Flexed spine noted.   Strength (Manual Muscle Testing)   Strength Comments Grossly deconditioned with UE dependence to power to stand, poor actdivity  tolerance with SOB.   Bed Mobility   Comment, (Bed Mobility) SBA   Transfers   Comment, (Transfers) Unsteady with post LOB, UE dependence - see intervention.   Gait/Stairs (Locomotion)   Distance in Feet CGA 10' RW unable to manage lines, unsteady, rapid fatigue with SOB.   Distance in Feet (Gait) 100' + 100' RW CGA safety spotting, line management, pacing assist, attention to direction, task, obstacle circumnagivation as pt bumping into things on right, postural sway and staggering when lets go with an UE from AD or looks around. Intemrittent seated breaks, mild SOB.   Balance   Balance Comments High falls risk: weak, unsteady, AMS, impaired obstacle clearance.   Coordination   Coordination Comments WFL   Muscle Tone   Muscle Tone Comments WFL   Oculomotor Exam   VOR Comments No complaints of dizziness functionally, is grossly unsteady including instabiltiies with head turns. Following.   Clinical Impression   Criteria for Skilled Therapeutic Intervention Yes, treatment indicated   PT Diagnosis (PT) Impaired mobility   Influenced by the following impairments Impaired strength, balance, actaivity tolerance, self-management   Functional limitations due to impairments Impaired independent mobility & living   Clinical Presentation (PT Evaluation Complexity) Evolving/Changing   Clinical Presentation Rationale Acute on chronic AMS   Clinical Decision Making (Complexity) low complexity   Planned Therapy Interventions (PT) balance training;bed mobility training;gait training;home exercise program;neuromuscular re-education;patient/family education;postural re-education;stair training;strengthening;transfer training;progressive activity/exercise;risk factor education;home program guidelines   Anticipated Equipment Needs at Discharge (PT) raised toilet seat;shower chair;walker, rolling;walker, standard;wheelchair;wheelchair cushion   Risk & Benefits of therapy have been explained evaluation/treatment results  reviewed;risks/benefits reviewed;care plan/treatment goals reviewed;current/potential barriers reviewed;participants included;patient   PT Total Evaluation Time   PT Eval, Low Complexity Minutes (87286) 5   Physical Therapy Goals   PT Frequency 5x/week   PT Predicted Duration/Target Date for Goal Attainment 08/10/23   PT Goals Bed Mobility;Transfers;Gait;Stairs;PT Goal 1   PT: Bed Mobility Modified independent;Supine to/from sit;Rolling   PT: Transfers Modified independent;Sit to/from stand;Bed to/from chair  (least restrictive device)   PT: Gait Modified independent;Greater than 200 feet  (least restrictive device)   PT: Stairs Greater than 10 stairs;Modified independent   PT: Goal 1 Pt will score >= 20 on the DGI/FGA/MiniBESTest to be a low falls risk, OR have safety & PT plan in place.   Interventions   Interventions Quick Adds Gait Training;Neuromuscular Re-ed;Therapeutic Activity   Therapeutic Activity   Treatment Detail/Skilled Intervention Pt agreeable to PT: Unsteady with post LOB, UE dependence - CGA safety assist with pt bracing legs into bed, increaed time & effort. 2 additonal stands, CGA stand-sit with safety cues, pivots RW CGA.   Gait Training   Gait Training Minutes (06986) 10   Symptoms Noted During/After Treatment (Gait Training) fatigue;shortness of breath   Treatment Detail/Skilled Intervention 1 stair 2reps 1 rail Wade unsteady & weak step-to pattern, safety assist, mild SOB.   Neuromuscular Re-education   Treatment Detail/Skilled Intervention CGA upright with RW bumping into things on R.   PT Discharge Planning   PT Plan PT: strength, balance, activity tolerance progressions.   PT Discharge Recommendation (DC Rec) Transitional Care Facility   PT Rationale for DC Rec Pt unsafe to return home unless has 24hr Ax1 and consistent RW use, home PT. TCU indicated.   PT Brief overview of current status Ax1 RW.   Total Session Time   Timed Code Treatment Minutes 10   Total Session Time (sum of timed and  untimed services) 15

## 2023-08-03 NOTE — PLAN OF CARE
Goal Outcome Evaluation:      Plan of Care Reviewed With: patient    Overall Patient Progress: improvingOverall Patient Progress: improving    Outcome Evaluation: No signs of hypotension noted,afebrile.Tolerating on room air the whole night.    Orientations: Alert and oriented to self.Intermittent confusion.  Vitals/Pain:Vital signs stable,On room air.Denies pain.  Tele:Sinus Bradycardia,Asymptomatic,CTM  Diet: Regular  Lungs: Clear  Lines/Drains: R PIV x 2,infusing with LR x 100 ml/hr  Skin/Wounds: Pressure injury on coccyx,skin barrier applied  GI/: Incontinent of urine and bowel.No BM on this shift.External catheter in place.  Labs: Abnormal/Trends, Electrolyte Replacement- Routine  Ambulation/Assist: Assist x 1-2, GB,Walker  Sleep Quality: Fair  Plan: Plan to continue antibiotic,for PT/OT evaluation.

## 2023-08-03 NOTE — PLAN OF CARE
Orientations: A/o x 3, disoriented to situation, forgetful  Vitals/Pain: VSS on RA, denies pain  Tele: Sinus bradycardia 45-60 bpm  Diet: Regular, good appetite  Lungs: CTA  Lines/Drains: R PIV, infusing with LR @ 75 ml/hr  Skin/Wounds: Buttocks red-blanchable. Sacral mepilex in place, repo L/R with pillows to offload  GI/: Incontinent of urine and bowel. BM x 1, external cath in place, good UO  Labs: Creat 1.25, WBC 7.1, no electrolyte protocols  Ambulation/Assist: Assist x 1, GB, Walker  Plan: IMC status discontinued. Monitor bradycardia. Continue IV abx and fluids. PT/OT evaluation. Encourage activity. Possible home tomorrow.    Goal Outcome Evaluation: progressing

## 2023-08-03 NOTE — PROGRESS NOTES
[facility]    Medicine Progress Note - Hospitalist Service    Date of Admission:  8/1/2023    Assessment & Plan   rFedrick Mehta is a 82 year old male with history of Alzheimer's dementia, HTN, paroxysmal Afib who presented to the ED with his wife for evaluation of dizziness. Initial work-up was unremarkable; imaging of the head was negative; earlier today 8/2 he started having rigors and RRT called.    Severe sepsis, unclear etiology  - He presented with dizziness; blood work on admission showed CBC- WNL.  Lactic acid 1; creatinine 1.35; LFTs WNL  - UA showed l red blood cells more than 182, white blood cells of 10, small leukocyte esterase; urine culture not done  - Afebrile on admission  - 8/2 am- noted to have rigors earlier; RRT called; noted to have rectal temperature of 102.1; appreciate house LARA interventions  - Blood cultures ordered  - Demerol 12.5 mg IV x1 given  - Lactic acid returned back elevated at 5.6; CBC with normal white blood cells  - Chest x-ray-no infiltrates  - Procalcitonin 10.94  - Received bolus 2 L lactated Ringer, followed by MIVF LR at 100cc/h  - started empirically on Zosyn and Vanco  - [facility]  - repeat LA down to 1.8  - afebrile last 24h  - WBCs 7.1  - feeling better today  - stop Vanco today, continue Zosyn for now  - Monitor fever curve and white blood cells trend  - Monitor blood cultures  - repeat procal in am  - decrease iv fluids rate to LR 75cc/h  - discontinue [facility] status    2. Dizziness  - Initially thought related to possible orthostatic hypotension but possible related to possible sepsis as above  - CT head-negative  - MRI brain, MRA brain and neck with no acute pathology  -Treat as above  - fall precautions  - Tele- sinus bradycardia HR in 50's, HR not low enough to explain the dizziness, PTA Cardizem held on admission.  - PT/OT- rec TCU    3.  Paroxysmal A-fib  4.  Simus bradycardia  - EKG on admission with sinus bradycardia at the rate  of 56 bpm  - PTA on Cardizem  mg p.o. twice daily-held on admission  - Tele- HR in mid 40's- mid 50's, sinus vick  - monitor for now  - Resume prior to admission Eliquis    5 Hypothyroidism  - Prior to admission levothyroxine    6. Alzheimer dementia  - Resume prior to admission Namenda and Aricept  - PT/OT eval    7.  Dyslipidemia  - Resume prior to admission statin    8.  CKD stage III  - Baseline creatinine between 1.3-1.5  - Creatinine on admission 1.36, down to 1.13  - cr 1.25 today  - BMP in a.m.     Diet: Regular Diet Adult    DVT Prophylaxis: DOAC  Bingham Catheter: Not present  Lines: None     Cardiac Monitoring: ACTIVE order. Indication: Bradycardias (48 hours)  Code Status: Full Code      Clinically Significant Risk Factors          # Hypocalcemia: Lowest Ca = 8.2 mg/dL in last 2 days, will monitor and replace as appropriate         # Thrombocytopenia: Lowest platelets = 129 in last 2 days, will monitor for bleeding                   Disposition Plan      Expected Discharge Date: 08/05/2023        Discharge Comments: [facility]       Ayse Pedersen MD  Hospitalist Service  [facility]  Securely message with [employer] (more info)  Text page via [facility] Paging/Directory   ______________________________________________________________________    Interval History   Doing better today  - does not remember not feeling well yesterday  - denies chest pain, no SOB  - no N/V, no abd pain  - Discussed with wife, bedside RN    Physical Exam   Vital Signs: Temp: 97.7  F (36.5  C) Temp src: Oral BP: 120/66 Pulse: 51   Resp: 15 SpO2: 96 % O2 Device: None (Room air) Oxygen Delivery: 2 LPM  Weight: 160 lbs 7.92 oz    GENERAL: awake, alert, NAD  HEENT: Anicteric sclera. PERRL  CV: RRR. S1, S2. No murmurs appreciated.   RESPIRATORY: bilateral air entry, no crackles, no rales  GI: abd- soft, nonT, nonD, BS present   MUSCULOSKELETAL: No joint swelling or tenderness. Moves all extremities.    NEUROLOGICAL: awake, alert, oriented to self and place and partially to time, no motor deficits noted  PSYCH: normal mood, pleasant   EXTREMITIES: No peripheral edema. Intact bilateral pedal pulses.   SKIN: No jaundice. No rashes.     Medical Decision Making       45 MINUTES SPENT BY ME on the date of service doing chart review, history, exam, documentation & further activities per the note.      Data     I have personally reviewed the following data over the past 24 hrs:    7.1  \   11.4 (L)   / 129 (L)     139 107 18.4 /  97   3.8 23 1.25 (H) \     Procal: 10.94 (HH) CRP: N/A Lactic Acid: 1.8         Imaging results reviewed over the past 24 hrs:   No results found for this or any previous visit (from the past 24 hour(s)).

## 2023-08-03 NOTE — PHARMACY-VANCOMYCIN DOSING SERVICE
"Pharmacy Vancomycin Initial Note  Date of Service August 3, 2023  Patient's  1940  82 year old, male    Indication: Sepsis    Current estimated CrCl = Estimated Creatinine Clearance: 46.1 mL/min (A) (based on SCr of 1.25 mg/dL (H)).    Creatinine for last 3 days  2023:  1:20 PM Creatinine 1.35 mg/dL  2023:  6:17 AM Creatinine 1.13 mg/dL  8/3/2023:  5:10 AM Creatinine 1.25 mg/dL    Recent Vancomycin Level(s) for last 3 days  No results found for requested labs within last 3 days.      Vancomycin IV Administrations (past 72 hours)                     vancomycin (VANCOCIN) 1,500 mg in 0.9% NaCl 250 mL intermittent infusion (mg) 1,500 mg New Bag 23 1310                    Nephrotoxins and other renal medications (From now, onward)      Start     Dose/Rate Route Frequency Ordered Stop    23 1000  vancomycin (VANCOCIN) 1000 mg in dextrose 5% 200 mL PREMIX         1,000 mg  200 mL/hr over 1 Hours Intravenous EVERY 24 HOURS 23 0851      23 1200  piperacillin-tazobactam (ZOSYN) 4.5 g vial to attach to  mL bag        Note to Pharmacy: For [facility], [facility] and [facility]: For Zosyn-naive patients, use the \"Zosyn initial dose + extended infusion\" order panel.    4.5 g  over 30 Minutes Intravenous EVERY 6 HOURS 23 1156              Contrast Orders - past 72 hours (72h ago, onward)      Start     Dose/Rate Route Frequency Stop    23 1825  gadobutrol (GADAVIST) injection 10 mL         10 mL Intravenous ONCE 23 1824            [employer] Prediction of Planned Initial Vancomycin Regimen   Regimen: 1000 mg IV every 24 hours.  Start time: 10:00 on 2023  Exposure target: AUC24 (range)400-600 mg/L.hr   AUC24,ss: 465 mg/L.hr  Probability of AUC24 > 400: 67 %  Ctrough,ss: 14.5 mg/L  Probability of Ctrough,ss > 20: 21 %  Probability of nephrotoxicity (Lodise ADENIKE ): 10 %      Plan:  Start vancomycin  1000 mg IV q24h.   Vancomycin monitoring method: AUC  Vancomycin therapeutic " monitoring goal: 400-600 mg*h/L  Pharmacy will check vancomycin levels as appropriate in 1-3 Days.    Serum creatinine levels will be ordered daily for the first week of therapy and at least twice weekly for subsequent weeks.      Jessica Garcia RPH, PharmD, BCPS

## 2023-08-04 ENCOUNTER — APPOINTMENT (OUTPATIENT)
Dept: PHYSICAL THERAPY | Facility: CLINIC | Age: 83
DRG: 872 | End: 2023-08-04
Payer: COMMERCIAL

## 2023-08-04 LAB
ANION GAP SERPL CALCULATED.3IONS-SCNC: 10 MMOL/L (ref 7–15)
BACTERIA UR CULT: NORMAL
BUN SERPL-MCNC: 12.1 MG/DL (ref 8–23)
CALCIUM SERPL-MCNC: 8.8 MG/DL (ref 8.8–10.2)
CHLORIDE SERPL-SCNC: 105 MMOL/L (ref 98–107)
CREAT SERPL-MCNC: 1.34 MG/DL (ref 0.67–1.17)
DEPRECATED HCO3 PLAS-SCNC: 26 MMOL/L (ref 22–29)
GFR SERPL CREATININE-BSD FRML MDRD: 53 ML/MIN/1.73M2
GLUCOSE SERPL-MCNC: 86 MG/DL (ref 70–99)
HGB BLD-MCNC: 12.2 G/DL (ref 13.3–17.7)
POTASSIUM SERPL-SCNC: 3.9 MMOL/L (ref 3.4–5.3)
PROCALCITONIN SERPL IA-MCNC: 6.66 NG/ML
SODIUM SERPL-SCNC: 141 MMOL/L (ref 136–145)

## 2023-08-04 PROCEDURE — 97530 THERAPEUTIC ACTIVITIES: CPT | Mod: GP

## 2023-08-04 PROCEDURE — 97116 GAIT TRAINING THERAPY: CPT | Mod: GP

## 2023-08-04 PROCEDURE — 120N000013 HC R&B IMCU

## 2023-08-04 PROCEDURE — 250N000011 HC RX IP 250 OP 636: Mod: JZ

## 2023-08-04 PROCEDURE — 85018 HEMOGLOBIN: CPT | Performed by: INTERNAL MEDICINE

## 2023-08-04 PROCEDURE — 99232 SBSQ HOSP IP/OBS MODERATE 35: CPT | Performed by: INTERNAL MEDICINE

## 2023-08-04 PROCEDURE — 84145 PROCALCITONIN (PCT): CPT | Performed by: INTERNAL MEDICINE

## 2023-08-04 PROCEDURE — 250N000013 HC RX MED GY IP 250 OP 250 PS 637: Performed by: INTERNAL MEDICINE

## 2023-08-04 PROCEDURE — 258N000003 HC RX IP 258 OP 636: Performed by: INTERNAL MEDICINE

## 2023-08-04 PROCEDURE — 36415 COLL VENOUS BLD VENIPUNCTURE: CPT | Performed by: INTERNAL MEDICINE

## 2023-08-04 PROCEDURE — 80048 BASIC METABOLIC PNL TOTAL CA: CPT | Performed by: INTERNAL MEDICINE

## 2023-08-04 RX ADMIN — LATANOPROST 1 DROP: 50 SOLUTION/ DROPS OPHTHALMIC at 21:47

## 2023-08-04 RX ADMIN — APIXABAN 5 MG: 5 TABLET, FILM COATED ORAL at 20:28

## 2023-08-04 RX ADMIN — PIPERACILLIN AND TAZOBACTAM 4.5 G: 4; .5 INJECTION, POWDER, FOR SOLUTION INTRAVENOUS at 07:11

## 2023-08-04 RX ADMIN — SODIUM CHLORIDE, POTASSIUM CHLORIDE, SODIUM LACTATE AND CALCIUM CHLORIDE: 600; 310; 30; 20 INJECTION, SOLUTION INTRAVENOUS at 01:35

## 2023-08-04 RX ADMIN — MEMANTINE 10 MG: 10 TABLET ORAL at 20:28

## 2023-08-04 RX ADMIN — DONEPEZIL HYDROCHLORIDE 10 MG: 10 TABLET ORAL at 20:28

## 2023-08-04 RX ADMIN — PRAVASTATIN SODIUM 30 MG: 20 TABLET ORAL at 20:27

## 2023-08-04 RX ADMIN — PIPERACILLIN AND TAZOBACTAM 4.5 G: 4; .5 INJECTION, POWDER, FOR SOLUTION INTRAVENOUS at 21:47

## 2023-08-04 RX ADMIN — APIXABAN 5 MG: 5 TABLET, FILM COATED ORAL at 08:40

## 2023-08-04 RX ADMIN — PIPERACILLIN AND TAZOBACTAM 4.5 G: 4; .5 INJECTION, POWDER, FOR SOLUTION INTRAVENOUS at 00:56

## 2023-08-04 RX ADMIN — LEVOTHYROXINE SODIUM 50 MCG: 50 TABLET ORAL at 08:40

## 2023-08-04 RX ADMIN — PIPERACILLIN AND TAZOBACTAM 4.5 G: 4; .5 INJECTION, POWDER, FOR SOLUTION INTRAVENOUS at 15:56

## 2023-08-04 ASSESSMENT — ACTIVITIES OF DAILY LIVING (ADL)
ADLS_ACUITY_SCORE: 37
ADLS_ACUITY_SCORE: 38
DEPENDENT_IADLS:: INDEPENDENT
ADLS_ACUITY_SCORE: 41
ADLS_ACUITY_SCORE: 38
ADLS_ACUITY_SCORE: 41
ADLS_ACUITY_SCORE: 38
ADLS_ACUITY_SCORE: 41
ADLS_ACUITY_SCORE: 38

## 2023-08-04 NOTE — PROGRESS NOTES
Patient is A/O x 3 with forgetfulness, BAKER, on room air, vss, a-febrile, denies pain, up with A1 GB and walker, incontinent of B&B, external catheter in place with good urine output, PT following, recommend TCU, patient lives with Wife in apartment, IV SL, tele SB.

## 2023-08-04 NOTE — PLAN OF CARE
A&O x 3, disoriented to situation, forgetful. Hypertensive at times, otherwise VSS on RA. Tele: SB 40-50s. Assist x1 w/GBW. Regular diet, tolerating well. Rt PIV infusing LR @ 75mL/hr. Denies pain. Incontinent of bowel and bladder, no BM this shift. External cath in place, excellent UO. Wife at bedside. Continue plan of care.

## 2023-08-04 NOTE — PROGRESS NOTES
[facility]    Medicine Progress Note - Hospitalist Service    Date of Admission:  8/1/2023    Assessment & Plan   Fredrick Mehta is a 82 year old male with history of Alzheimer's dementia, HTN, paroxysmal Afib who presented to the ED with his wife for evaluation of dizziness. Initial work-up was unremarkable; imaging of the head was negative; earlier today 8/2 he started having rigors and RRT called.    Severe sepsis, unclear etiology  - He presented with dizziness; blood work on admission showed CBC- WNL.  Lactic acid 1; creatinine 1.35; LFTs WNL  - UA showed l red blood cells more than 182, white blood cells of 10, small leukocyte esterase; urine culture not done  - Afebrile on admission  - 8/2 am- noted to have rigors earlier; RRT called; noted to have rectal temperature of 102.1; appreciate house LARA interventions  - Blood cultures ordered  - Demerol 12.5 mg IV x1 given  - Lactic acid returned back elevated at 5.6; CBC with normal white blood cells  - Chest x-ray-no infiltrates  - Procalcitonin 10.94  - Received bolus 2 L lactated Ringer, followed by MIVF LR at 100cc/h  - started empirically on Zosyn and Vanco  - [place] [facility] [place] repeat LA down to 1.8  - afebrile last 24h  - WBCs 7.1, procal down to 6.66  - doing better  - Vanco stopped on 8/3, continue Zosyn for now  - Monitor fever curve and white blood cells trend  - cultures negative  - Monitor blood cultures  - stop iv fluids    2. Dizziness  - Initially thought related to possible orthostatic hypotension but possible related to possible sepsis as above  - CT head-negative  - MRI brain, MRA brain and neck with no acute pathology  -Treat as above  - fall precautions  - Tele- sinus bradycardia HR in 50's, HR not low enough to explain the dizziness, PTA Cardizem held on admission.  - PT/OT- rec TCU  - discussed with wife, she is not sure if he would want to go to TCU  - SW consult    3.  Paroxysmal A-fib  4.  Simus bradycardia  -  EKG on admission with sinus bradycardia at the rate of 56 bpm  - PTA on Cardizem  mg p.o. twice daily-held on admission  - Tele- HR in mid 40's- mid 50's, sinus vick  - monitor for now; HR remains on lower side; would not resume Cardizem after discharge   - Resume prior to admission Eliquis    5 Hypothyroidism  - Prior to admission levothyroxine    6. Alzheimer dementia  - Resume prior to admission Namenda and Aricept  - PT/OT eval    7.  Dyslipidemia  - Resume prior to admission statin    8.  CKD stage III  - Baseline creatinine between 1.3-1.5  - Creatinine on admission 1.36, down to 1.13  - cr 1.25--1.34 today       Diet: Regular Diet Adult    DVT Prophylaxis: DOAC  Bingham Catheter: Not present  Lines: None     Cardiac Monitoring: ACTIVE order. Indication: Bradycardias (48 hours)  Code Status: Full Code      Clinically Significant Risk Factors                  # Thrombocytopenia: Lowest platelets = 129 in last 2 days, will monitor for bleeding                     Disposition Plan      Expected Discharge Date: 08/05/2023        Discharge Comments: TCU       Ayse Pedersen MD  Hospitalist Service  [facility]  Securely message with [employer] (more info)  Text page via [employer] Paging/Directory   ______________________________________________________________________    Interval History   Doing fine  - denies chest pain, no SOB  - no N/V, no abd pain  - Discussed with wife about the recommendation for TCU; she was not aware of this as she was not present during PT eval; she thinks he might not agree with TCU as he was looking forward to go home  - discussed with bedside RN and SW    Physical Exam   Vital Signs: Temp: 98.9  F (37.2  C) Temp src: Oral BP: (!) 140/68 Pulse: 60   Resp: 18 SpO2: 96 % O2 Device: None (Room air)    Weight: 160 lbs 7.92 oz    GENERAL: awake, alert, NAD  HEENT: Anicteric sclera. PERRL  CV: RRR. S1, S2. No murmurs appreciated.   RESPIRATORY: bilateral air entry,  no crackles, no rales  GI: abd- soft, nonT, nonD, BS present   MUSCULOSKELETAL: No joint swelling or tenderness. Moves all extremities.   NEUROLOGICAL: awake, alert, oriented to self and place and partially to time, no motor deficits noted  PSYCH: normal mood, pleasant   EXTREMITIES: No peripheral edema. Intact bilateral pedal pulses.   SKIN: No jaundice. No rashes.     Medical Decision Making       45 MINUTES SPENT BY ME on the date of service doing chart review, history, exam, documentation & further activities per the note.      Data     I have personally reviewed the following data over the past 24 hrs:    N/A  \   12.2 (L)   / N/A     141 105 12.1 /  86   3.9 26 1.34 (H) \     Procal: 6.66 (HH) CRP: N/A Lactic Acid: N/A         Imaging results reviewed over the past 24 hrs:   No results found for this or any previous visit (from the past 24 hour(s)).
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
## 2023-08-05 ENCOUNTER — APPOINTMENT (OUTPATIENT)
Dept: OCCUPATIONAL THERAPY | Facility: CLINIC | Age: 83
DRG: 872 | End: 2023-08-05
Attending: INTERNAL MEDICINE
Payer: COMMERCIAL

## 2023-08-05 LAB
CREAT SERPL-MCNC: 1.32 MG/DL (ref 0.67–1.17)
GFR SERPL CREATININE-BSD FRML MDRD: 54 ML/MIN/1.73M2

## 2023-08-05 PROCEDURE — 97535 SELF CARE MNGMENT TRAINING: CPT | Mod: GO

## 2023-08-05 PROCEDURE — 120N000013 HC R&B IMCU

## 2023-08-05 PROCEDURE — 97530 THERAPEUTIC ACTIVITIES: CPT | Mod: GO

## 2023-08-05 PROCEDURE — 36415 COLL VENOUS BLD VENIPUNCTURE: CPT

## 2023-08-05 PROCEDURE — 250N000013 HC RX MED GY IP 250 OP 250 PS 637: Performed by: INTERNAL MEDICINE

## 2023-08-05 PROCEDURE — 99232 SBSQ HOSP IP/OBS MODERATE 35: CPT | Performed by: INTERNAL MEDICINE

## 2023-08-05 PROCEDURE — 97165 OT EVAL LOW COMPLEX 30 MIN: CPT | Mod: GO

## 2023-08-05 PROCEDURE — 82565 ASSAY OF CREATININE: CPT

## 2023-08-05 PROCEDURE — 250N000011 HC RX IP 250 OP 636: Mod: JZ

## 2023-08-05 RX ORDER — CEPHALEXIN 500 MG/1
500 CAPSULE ORAL EVERY 8 HOURS SCHEDULED
Status: DISCONTINUED | OUTPATIENT
Start: 2023-08-05 | End: 2023-08-07 | Stop reason: HOSPADM

## 2023-08-05 RX ADMIN — APIXABAN 5 MG: 5 TABLET, FILM COATED ORAL at 09:43

## 2023-08-05 RX ADMIN — LEVOTHYROXINE SODIUM 50 MCG: 50 TABLET ORAL at 09:43

## 2023-08-05 RX ADMIN — PIPERACILLIN AND TAZOBACTAM 4.5 G: 4; .5 INJECTION, POWDER, FOR SOLUTION INTRAVENOUS at 04:03

## 2023-08-05 RX ADMIN — MEMANTINE 10 MG: 10 TABLET ORAL at 21:09

## 2023-08-05 RX ADMIN — PIPERACILLIN AND TAZOBACTAM 4.5 G: 4; .5 INJECTION, POWDER, FOR SOLUTION INTRAVENOUS at 09:43

## 2023-08-05 RX ADMIN — PRAVASTATIN SODIUM 30 MG: 20 TABLET ORAL at 21:09

## 2023-08-05 RX ADMIN — CEPHALEXIN 500 MG: 500 CAPSULE ORAL at 15:48

## 2023-08-05 RX ADMIN — LATANOPROST 1 DROP: 50 SOLUTION/ DROPS OPHTHALMIC at 21:09

## 2023-08-05 RX ADMIN — DONEPEZIL HYDROCHLORIDE 10 MG: 10 TABLET ORAL at 21:09

## 2023-08-05 RX ADMIN — APIXABAN 5 MG: 5 TABLET, FILM COATED ORAL at 21:09

## 2023-08-05 ASSESSMENT — ACTIVITIES OF DAILY LIVING (ADL)
ADLS_ACUITY_SCORE: 42
ADLS_ACUITY_SCORE: 41
ADLS_ACUITY_SCORE: 42
ADLS_ACUITY_SCORE: 40
ADLS_ACUITY_SCORE: 42
ADLS_ACUITY_SCORE: 41
ADLS_ACUITY_SCORE: 42
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
## 2023-08-05 NOTE — PROGRESS NOTES
[facility]    Medicine Progress Note - Hospitalist Service    Date of Admission:  8/1/2023    Assessment & Plan   Fredrick Mehta is a 82 year old male with history of Alzheimer's dementia, HTN, paroxysmal Afib who presented to the ED with his wife for evaluation of dizziness. Initial work-up was unremarkable; imaging of the head was negative; earlier today 8/2 he started having rigors and RRT called.    Severe sepsis, unclear etiology  - He presented with dizziness; blood work on admission showed CBC- WNL.  Lactic acid 1; creatinine 1.35; LFTs WNL  - UA showed l red blood cells more than 182, white blood cells of 10, small leukocyte esterase; urine culture not done  - Afebrile on admission  - 8/2 am- noted to have rigors earlier; RRT called; noted to have rectal temperature of 102.1; appreciate house LARA interventions  - Blood cultures ordered  - Demerol 12.5 mg IV x1 given  - Lactic acid returned back elevated at 5.6; CBC with normal white blood cells  - Chest x-ray-no infiltrates  - Procalcitonin 10.94  - Received bolus 2 L lactated Ringer, followed by MIVF LR at 100cc/h  - started empirically on Zosyn and Vanco  - Transferred to [facility] [place]  - repeat LA down to 1.8  - afebrile last 24h  - WBCs 7.1, procal down to 6.66  - doing better  - Vanco stopped on 8/3, on Zosyn- day# 4; will change to Keflex for 3 more days  - Monitor fever curve and white blood cells trend  - cultures remains negative  - Monitor blood cultures    2. Dizziness, resolved  - Initially thought related to possible orthostatic hypotension but possible related to possible sepsis as above  - CT head-negative  - MRI brain, MRA brain and neck with no acute pathology  - Treat as above  - fall precautions  - Tele- sinus bradycardia HR in 50's, HR not low enough to explain the dizziness, PTA Cardizem held on admission.  - PT/OT- rec TCU  - the wife and the patient agree with TCU  - SW consult    3.  Paroxysmal A-fib  4.  Simus  bradycardia  - EKG on admission with sinus bradycardia at the rate of 56 bpm  - PTA on Cardizem  mg p.o. twice daily-held on admission  - Tele- HR in mid 40's- mid 50's, sinus vick  - monitor for now; HR remains on lower side; would not resume Cardizem after discharge   - Resume prior to admission Eliquis    5 Hypothyroidism  - Prior to admission levothyroxine    6. Alzheimer dementia  - Resume prior to admission Namenda and Aricept  - PT/OT eval    7.  Dyslipidemia  - Resume prior to admission statin    8.  CKD stage III  - Baseline creatinine between 1.3-1.5  - Creatinine on admission 1.36, down to 1.13  - cr 1.25--1.34 today       Diet: Regular Diet Adult    DVT Prophylaxis: DOAC  Bingham Catheter: Not present  Lines: None     Cardiac Monitoring: ACTIVE order. Indication: Bradycardias (48 hours)  Code Status: Full Code      Clinically Significant Risk Factors                                    Disposition Plan      Expected Discharge Date: 08/05/2023        Discharge Comments: [facility]       Ayse Pedersen MD  Hospitalist Service  [facility]  Securely message with [employer] (more info)  Text page via [facility] Paging/Directory   ______________________________________________________________________    Interval History   Feeling fine, eating breakfast  - denies chest pain, no SOB  - no N/V, no abd pain  - agrees to go to TCU  - discussed with the wife and bedside RN     Physical Exam   Vital Signs: Temp: 97.3  F (36.3  C) Temp src: Oral BP: 122/75 Pulse: 55   Resp: 16 SpO2: 95 % O2 Device: None (Room air)    Weight: 160 lbs 7.92 oz    GENERAL: awake, alert, NAD  HEENT: Anicteric sclera. PERRL  CV: RRR. S1, S2. No murmurs appreciated.   RESPIRATORY: bilateral air entry, no crackles, no rales  GI: abd- soft, nonT, nonD, BS present   MUSCULOSKELETAL: No joint swelling or tenderness. Moves all extremities.   NEUROLOGICAL: awake, alert, oriented to self and place and partially to time, no motor  deficits noted  PSYCH: normal mood, pleasant   EXTREMITIES: No peripheral edema. Intact bilateral pedal pulses.   SKIN: No jaundice. No rashes.     Medical Decision Making       35 MINUTES SPENT BY ME on the date of service doing chart review, history, exam, documentation & further activities per the note.      Data     I have personally reviewed the following data over the past 24 hrs:    N/A  \   N/A   / N/A     N/A N/A N/A /  N/A   N/A N/A 1.32 (H) \       Imaging results reviewed over the past 24 hrs:   No results found for this or any previous visit (from the past 24 hour(s)).

## 2023-08-05 NOTE — PROGRESS NOTES
"   08/05/23 1400   Appointment Info   Signing Clinician's Name / Credentials (OT) Sheridan Huerta, OTR/L   Rehab Comments (OT) Initial OT Eval   Living Environment   People in Home spouse   Current Living Arrangements house   Home Accessibility stairs to enter home;stairs within home   Number of Stairs, Main Entrance 3;4   Stair Railings, Main Entrance railings safe and in good condition   Number of Stairs, Within Home, Primary greater than 10 stairs   Stair Railings, Within Home, Primary railings safe and in good condition   Living Environment Comments bed/bath upstairs. pt is unreliable historian. tub shower combo   Self-Care   Usual Activity Tolerance moderate   Current Activity Tolerance fair   Equipment Currently Used at Home none   Fall history within last six months no   Activity/Exercise/Self-Care Comment pt reports indp at baseline   Instrumental Activities of Daily Living (IADL)   IADL Comments pt and wife reports \"we do everything together\"   General Information   Onset of Illness/Injury or Date of Surgery 08/01/23   Referring Physician Eusebia Serrano MD   Patient/Family Therapy Goal Statement (OT) wife agreeable to patient discharge to TCU   Additional Occupational Profile Info/Pertinent History of Current Problem Fredrick Mehta is a 82 year old male with history of Alzheimer's dementia, HTN, paroxysmal Afib who presented to the ED with his wife for evaluation of dizziness. now found to have sepsis   Existing Precautions/Restrictions fall   General Observations and Info on RA. sats stable   Cognitive Status Examination   Orientation Status person;place   Cognitive Status Comments disoriented to time and situation.. baseline alzherimer's dementia   Visual Perception   Visual Impairment/Limitations WFL   Sensory   Sensory Quick Adds sensation intact   Pain Assessment   Patient Currently in Pain Yes, see Vital Sign flowsheet   Posture   Posture forward head position;protracted shoulders   Range of Motion " Comprehensive   General Range of Motion no range of motion deficits identified   Strength Comprehensive (MMT)   Comment, General Manual Muscle Testing (MMT) Assessment generalized weakness globally . 3+/5 BUE equal   Coordination   Upper Extremity Coordination No deficits were identified   Bed Mobility   Bed Mobility supine-sit   Supine-Sit [place] (Bed Mobility) minimum assist (75% patient effort)   Transfers   Transfers sit-stand transfer   Sit-Stand Transfer   Sit-Stand [place] (Transfers) minimum assist (75% patient effort)   Assistive Device (Sit-Stand Transfers) walker, front-wheeled   Balance   Balance Comments defer to PT   Activities of Daily Living   BADL Assessment/Intervention upper body dressing;lower body dressing;toileting;bathing   Bathing Assessment/Intervention   [place] Level (Bathing) maximum assist (25% patient effort)   Upper Body Dressing Assessment/Training   [place] Level (Upper Body Dressing) minimum assist (75% patient effort)   Lower Body Dressing Assessment/Training   [place] Level (Lower Body Dressing) maximum assist (25% patient effort)   Toileting   [place] Level (Toileting) moderate assist (50% patient effort)   Clinical Impression   Criteria for Skilled Therapeutic Interventions Met (OT) Yes, treatment indicated   OT Diagnosis decreased function in ADL   OT Problem List-Impairments impacting ADL problems related to;activity tolerance impaired;balance;cognition;mobility;pain;strength   Assessment of Occupational Performance 3-5 Performance Deficits   Identified Performance Deficits bathing, dressing, toileting, transfer, mobility, IADL   Planned Therapy Interventions (OT) ADL retraining;cognition;progressive activity/exercise   Clinical Decision Making Complexity (OT) low complexity   Risk & Benefits of therapy have been explained evaluation/treatment results reviewed;care plan/treatment goals reviewed;risks/benefits reviewed;current/potential barriers  reviewed;participants voiced agreement with care plan;participants included;patient;spouse/significant other   Clinical Impression Comments decreased function in ADL warrants skilled therapy   OT Total Evaluation Time   OT Eval, Low Complexity Minutes (90164) 13   OT Goals   Therapy Frequency (OT) 4 times/wk   OT Predicted Duration/Target Date for Goal Attainment 08/19/23   OT Goals Hygiene/Grooming;Upper Body Dressing;Lower Body Dressing;Toilet Transfer/Toileting;Cognition   OT: Hygiene/Grooming supervision/stand-by assist   OT: Upper Body Dressing Supervision/stand-by assist   OT: Lower Body Dressing Minimal assist   OT: Toilet Transfer/Toileting Minimal assist   OT: Cognitive Patient/caregiver will verbalize understanding of cognitive assessment results/recommendations as needed for safe discharge planning   Interventions   Interventions Quick Adds Therapeutic Activity;Self-Care/Home Management   Self-Care/Home Management   Self-Care/Home Mgmt/ADL, Compensatory, Meal Prep Minutes (50142) 14   Symptoms Noted During/After Treatment (Meal Preparation/Planning Training) fatigue   Treatment Detail/Skilled Intervention pt seen for OT tx session this date. agreeable. put unaware that he was soiled. VC/C to amb ot bathroom pt concrned about brief falling off, abandons walker, needs cues and TC to remind him to use. mod A for balance when distructed. min A to tx onto low tilet VC for use of grab bar. max A for brief mgmt and peir cares with cues. end of sesison sit>supine with VC/ tC and sequencing for gettting legs into bed min A. with RN   Therapeutic Activities   Therapeutic Activity Minutes (34105) 8   Symptoms noted during/after treatment fatigue   Treatment Detail/Skilled Intervention pt amb in hallway for 8 minutes with 2 seated rest breaks. needs cues to safety with walker mgmt and VC for sequncing and pathfinding   OT Discharge Planning   OT Plan toileting, LB dressing, home safety Qs or SBT   OT Discharge  Recommendation (DC Rec) Transitional Care Facility   OT Rationale for DC Rec pt below reported baseline function in self care and ADL. high risk for falls and readmission. recommend discharge to TCU to increase function and safety. pt and wife would likely benefit from more supportive living in future such as long-term. he is confused but cooperative and pleasant   OT Brief overview of current status Ax1   Total Session Time   Timed Code Treatment Minutes 22   Total Session Time (sum of timed and untimed services) 35

## 2023-08-05 NOTE — PLAN OF CARE
Orientations: A&O x3  Diet: Regular Diet   Vitals/Pain: VSS on RA  Tele: SB  Lines/Drains: R PIV SL  Skin/Wounds: WDL  GI/: Incontinent of urine    Labs: Abnormal/Trends, Electrolyte Replacement- NA  Ambulation/Assist: Assist x 2  Sleep Quality: good   Pain Management: denies pain

## 2023-08-05 NOTE — PROGRESS NOTES
Care Management Follow Up    Length of Stay (days): 4    Expected Discharge Date: 08/06/2023     Concerns to be Addressed: all concerns addressed in this encounter     Patient plan of care discussed at interdisciplinary rounds: Yes    Anticipated Discharge Disposition: Transitional Care     Anticipated Discharge Services: Meals on Wheels  Anticipated Discharge DME: None    Patient/family educated on Medicare website which has current facility and service quality ratings: yes  Education Provided on the Discharge Plan:    Patient/Family in Agreement with the Plan: yes    Referrals Placed by CM/SW:    Private pay costs discussed: Not applicable    Additional Information:  Writer left message for patients spouse to discuss discharge planning. Patients spouse is currently in the ER. Will meet with patient later today    Writer spoke with spouse Rosalva to discuss TCU. Rosalva would like TCU referral sent to [facility]. She is not wanting any other facility until we hear from [facility] as that is by far their first choice. Writer sent referral over for patient.     Addendum: Patient was accepted at [facility]. Writer called over to plan discharge. Will wait to hear back. Likely discharge on Sunday.     ANDREZ Bay

## 2023-08-05 NOTE — PLAN OF CARE
Goal Outcome Evaluation:  DATE & TIME 08/04/23, [id number]   Cognitive Concerns/ Orientation : A & O times three  BEHAVIOR & AGGRESSION TOOL COLOR: calm  ABNL VS/O2: VSS, room air  MOBILITY: assist of two  PAIN MANAGMENT: denied  DIET: regular  BOWEL/BLADDER: incontinent  ABNL LAB/BG:   DRAIN/DEVICES: PIV SL  TELEMETRY RHYTHM: SB  SKIN: WDL  D/C DATE: possibly TCU, pending  OTHER IMPORTANT INFO: continue to monitor.

## 2023-08-06 LAB
CREAT SERPL-MCNC: 1.27 MG/DL (ref 0.67–1.17)
GFR SERPL CREATININE-BSD FRML MDRD: 56 ML/MIN/1.73M2
MAGNESIUM SERPL-MCNC: 2 MG/DL (ref 1.7–2.3)

## 2023-08-06 PROCEDURE — 82565 ASSAY OF CREATININE: CPT

## 2023-08-06 PROCEDURE — 250N000013 HC RX MED GY IP 250 OP 250 PS 637: Performed by: INTERNAL MEDICINE

## 2023-08-06 PROCEDURE — 99232 SBSQ HOSP IP/OBS MODERATE 35: CPT | Performed by: INTERNAL MEDICINE

## 2023-08-06 PROCEDURE — 250N000013 HC RX MED GY IP 250 OP 250 PS 637

## 2023-08-06 PROCEDURE — 83735 ASSAY OF MAGNESIUM: CPT | Performed by: INTERNAL MEDICINE

## 2023-08-06 PROCEDURE — 120N000013 HC R&B IMCU

## 2023-08-06 PROCEDURE — 36415 COLL VENOUS BLD VENIPUNCTURE: CPT

## 2023-08-06 RX ORDER — ACETAMINOPHEN 325 MG/1
325-650 TABLET ORAL EVERY 6 HOURS PRN
DISCHARGE
Start: 2023-08-06

## 2023-08-06 RX ORDER — CETIRIZINE HYDROCHLORIDE 5 MG/1
5 TABLET ORAL DAILY PRN
Status: DISCONTINUED | OUTPATIENT
Start: 2023-08-06 | End: 2023-08-07 | Stop reason: HOSPADM

## 2023-08-06 RX ORDER — CEPHALEXIN 500 MG/1
500 CAPSULE ORAL EVERY 8 HOURS
DISCHARGE
Start: 2023-08-06 | End: 2023-08-07

## 2023-08-06 RX ADMIN — DONEPEZIL HYDROCHLORIDE 10 MG: 10 TABLET ORAL at 20:31

## 2023-08-06 RX ADMIN — LEVOTHYROXINE SODIUM 50 MCG: 50 TABLET ORAL at 09:05

## 2023-08-06 RX ADMIN — APIXABAN 5 MG: 5 TABLET, FILM COATED ORAL at 20:31

## 2023-08-06 RX ADMIN — CEPHALEXIN 500 MG: 500 CAPSULE ORAL at 09:05

## 2023-08-06 RX ADMIN — CETIRIZINE HYDROCHLORIDE 5 MG: 5 TABLET ORAL at 16:35

## 2023-08-06 RX ADMIN — APIXABAN 5 MG: 5 TABLET, FILM COATED ORAL at 09:05

## 2023-08-06 RX ADMIN — CEPHALEXIN 500 MG: 500 CAPSULE ORAL at 23:27

## 2023-08-06 RX ADMIN — MEMANTINE 10 MG: 10 TABLET ORAL at 20:31

## 2023-08-06 RX ADMIN — CEPHALEXIN 500 MG: 500 CAPSULE ORAL at 00:09

## 2023-08-06 RX ADMIN — ACETAMINOPHEN 650 MG: 325 TABLET, FILM COATED ORAL at 20:32

## 2023-08-06 RX ADMIN — PRAVASTATIN SODIUM 30 MG: 20 TABLET ORAL at 20:31

## 2023-08-06 RX ADMIN — CEPHALEXIN 500 MG: 500 CAPSULE ORAL at 16:35

## 2023-08-06 RX ADMIN — LATANOPROST 1 DROP: 50 SOLUTION/ DROPS OPHTHALMIC at 23:27

## 2023-08-06 ASSESSMENT — ACTIVITIES OF DAILY LIVING (ADL)
ADLS_ACUITY_SCORE: 43
ADLS_ACUITY_SCORE: 43
ADLS_ACUITY_SCORE: 40
ADLS_ACUITY_SCORE: 42
ADLS_ACUITY_SCORE: 43
ADLS_ACUITY_SCORE: 40
ADLS_ACUITY_SCORE: 42
ADLS_ACUITY_SCORE: 43
ADLS_ACUITY_SCORE: 43
ADLS_ACUITY_SCORE: 40
ADLS_ACUITY_SCORE: 43
ADLS_ACUITY_SCORE: 42

## 2023-08-06 NOTE — PLAN OF CARE
Date & Time: 8/6 8363-1211  Diagnosis: Dizziness  Procedures: NA  Orientation/Cognitive: AOx2-4, forgetful, waxes & wanes  VS/O2: VSS, RA  Mobility: A1 + gb/walker  Diet: Regular  Pain Management: Denies - PRNs available  Bowel & Bladder: Incontinent, external cath   Skin: WDL  Abnormal Labs: Cr 1.27  Tele: SB + PVCs  IV Access/Drips/Fluids: R PIV SL   Drains: NA  Tests: Xray/MRI/CT - negative  Consults: Hospitalist  Discharge Plan: Pending - [facility] 8/7  Other: 8/2 - RRT d/t rigors, hx alzheimer's

## 2023-08-06 NOTE — PLAN OF CARE
Pt A&ox2-3 Disoriented to situation and time.  Forgetful. r/t to Hx alzheimer's. VSS on RA. Denies pain. Tele SB. Vtach run x1 overnight. Asymptomatic. Rate was 128-130bmp. Up Ax1 GB and walker. Regular diet, takes pills whole with thin liquids. PIV x1 SL. Mepilex to buttocks.   possible discharge today ro TCU.

## 2023-08-06 NOTE — PROGRESS NOTES
[facility]    Medicine Progress Note - Hospitalist Service    Date of Admission:  8/1/2023    Assessment & Plan   Fredrick Mehta is a 82 year old male with history of Alzheimer's dementia, HTN, paroxysmal Afib who presented to the ED with his wife for evaluation of dizziness. Initial work-up was unremarkable; imaging of the head was negative; earlier today 8/2 he started having rigors and RRT called.    Severe sepsis, unclear etiology  - He presented with dizziness; blood work on admission showed CBC- WNL.  Lactic acid 1; creatinine 1.35; LFTs WNL  - UA showed l red blood cells more than 182, white blood cells of 10, small leukocyte esterase; urine culture not done  - Afebrile on admission  - 8/2 am- noted to have rigors earlier; RRT called; noted to have rectal temperature of 102.1; appreciate house LARA interventions  - Blood cultures ordered  - Demerol 12.5 mg IV x1 given  - Lactic acid returned back elevated at 5.6; CBC with normal white blood cells  - Chest x-ray-no infiltrates  - Procalcitonin 10.94  - Received bolus 2 L lactated Ringer, followed by MIVF LR at 100cc/h  - started empirically on Zosyn and Vanco  - Transfer[place] [facility]  - repeat LA down to 1.8  - afebrile last 24h  - WBCs 7.1, procal down to 6.66  - doing better  - Vanco stopped on 8/3, received  Zosyn for 4 days, changed to Keflex for 3 more days  - Monitor fever curve and white blood cells trend  - cultures remains negative  - Monitor blood cultures    2. Dizziness, resolved  - Initially thought related to possible orthostatic hypotension but possible related to possible sepsis as above  - CT head-negative  - MRI brain, MRA brain and neck with no acute pathology  - Treat as above  - fall precautions  - Tele- sinus bradycardia HR in 50's, HR not low enough to explain the dizziness, PTA Cardizem held on admission.  - Echo  - PT/OT- rec TCU  - the wife and the patient agree with TCU  - SW consult    3.  Paroxysmal A-fib  4.   Simus bradycardia  - EKG on admission with sinus bradycardia at the rate of 56 bpm  - PTA on Cardizem  mg p.o. twice daily-held on admission  - Tele- HR in mid 40's- mid 50's, sinus vick  - monitor for now; HR remains on lower side; would not resume Cardizem after discharge   - had a short run of Vtach last night  - Resume prior to admission Eliquis    5 Hypothyroidism  - Prior to admission levothyroxine    6. Alzheimer dementia  - Resume prior to admission Namenda and Aricept  - PT/OT eval    7.  Dyslipidemia  - Resume prior to admission statin    8.  CKD stage III  - Baseline creatinine between 1.3-1.5  - Creatinine on admission 1.36, down to 1.13  - cr 1.25--1.34--1.27 today       Diet: Regular Diet Adult  Diet    DVT Prophylaxis: DOAC  [place] Catheter: Not present  Lines: None     Cardiac Monitoring: ACTIVE order. Indication: Bradycardias (48 hours)  Code Status: Full Code      Clinically Significant Risk Factors                                    Disposition Plan      Expected Discharge Date: pending Echo, possible discharge today       Discharge Comments: U       Ayse Pedersen MD  Hospitalist Service  [facility]  Securely message with E-Sign (more info)  Text page via [facility] Paging/Directory   ______________________________________________________________________    Interval History   Feeling fine, slept well  - denies chest pain, no SOB  - no N/V, no abd pain  - agrees to go to TCU  - discussed with the wife, SW and bedside RN     Physical Exam   Vital Signs: Temp: 97.3  F (36.3  C) Temp src: Oral BP: (!) 142/78 Pulse: 51   Resp: 16 SpO2: 95 % O2 Device: None (Room air)    Weight: 164 lbs 14.47 oz    GENERAL: awake, alert, NAD  HEENT: Anicteric sclera. PERRL  CV: RRR. S1, S2. No murmurs appreciated.   RESPIRATORY: bilateral air entry, no crackles, no rales  GI: abd- soft, nonT, nonD, BS present   MUSCULOSKELETAL: No joint swelling or tenderness. Moves all extremities.    NEUROLOGICAL: awake, alert, oriented to self and place and partially to time, no motor deficits noted  PSYCH: normal mood, pleasant   EXTREMITIES: No peripheral edema. Intact bilateral pedal pulses.   SKIN: No jaundice. No rashes.     Medical Decision Making       35 MINUTES SPENT BY ME on the date of service doing chart review, history, exam, documentation & further activities per the note.      Data     I have personally reviewed the following data over the past 24 hrs:    N/A  \   N/A   / N/A     N/A N/A N/A /  N/A   N/A N/A 1.27 (H) \       Imaging results reviewed over the past 24 hrs:   No results found for this or any previous visit (from the past 24 hour(s)).

## 2023-08-06 NOTE — PROGRESS NOTES
Care Management Follow Up    Length of Stay (days): 5    Expected Discharge Date: 08/06/2023     Concerns to be Addressed: all concerns addressed in this encounter     Patient plan of care discussed at interdisciplinary rounds: Yes    Anticipated Discharge Disposition: Transitional Care     Anticipated Discharge Services: Meals on Wheels  Anticipated Discharge DME: None    Patient/family educated on Medicare website which has current facility and service quality ratings: yes  Education Provided on the Discharge Plan:    Patient/Family in Agreement with the Plan: yes    Referrals Placed by CM/SW:    Private pay costs discussed: Not applicable    Additional Information:  Writer called [facility] [phone] and spoke with Yoli. Yoli stated that they can accept the patient into a semi-private room tomorrow. Yoli stated they would like a could hours to clean the room for the patient and was not sure when the room would be cleared out. Writer asked if they could do an afternoon discharge to give them time. Yoli stated that she would call back to see what time the room would be available.    Addendum 1207:  Writer got a call back from Yoli at [facility]. She stated that they can accept the patient tomorrow after 1400 and the latest would be 1800, although they can adjust if necessary.     Writer messaged Dr. Pedersen via [employer] and confirmed that the patient could potentially be ready tomorrow.     Patient and spouse agreed to have [facility] set up for them. Patient and spouse stated they do not have anyone to help them transport. Writer informed them of the cost for transportation.     Writer updated the patient and spouse at bedside. Spouse asked the writer if he knew if the [facility] had any rooms for rent. Spouse stated that she has a home but she has tired her resources for getting a ride to and from the hospital to visit the patient. Writer stated that he could ask, but they could help her get  set up with resources once settled in [place].        Addendum 1424:  Writer completed the PAS  PAS-RR    D: Per DHS regulation, SW completed and submitted PAS-RR to [place] [employer] on [employer]ging Direct Connect via the Senior LinkAge Line.  PAS-RR confirmation # is : [id number]    I: SW spoke with patient and they are aware a PAS-RR has been submitted.  SW reviewed with patient that they may be contacted for a follow up appointment within 10 days of hospital discharge if their SNF stay is < 30 days.  Contact information for Senior LinkAge Line was also provided.    A: patient verbalized understanding.    P: Further questions may be directed to [facility] LinkAge Line at #[phone], option #4 for PAS-RR staff.    Writer called  [facility] [phone] and spoke with Jewell to set up a tentative W/C ride between [phone].    Writer will update patient and nursing staff.      ANDREZ Alberto  [facility]  Social Work

## 2023-08-07 ENCOUNTER — APPOINTMENT (OUTPATIENT)
Dept: PHYSICAL THERAPY | Facility: CLINIC | Age: 83
DRG: 872 | End: 2023-08-07
Payer: COMMERCIAL

## 2023-08-07 ENCOUNTER — APPOINTMENT (OUTPATIENT)
Dept: CARDIOLOGY | Facility: CLINIC | Age: 83
DRG: 872 | End: 2023-08-07
Attending: INTERNAL MEDICINE
Payer: COMMERCIAL

## 2023-08-07 ENCOUNTER — LAB REQUISITION (OUTPATIENT)
Dept: LAB | Facility: CLINIC | Age: 83
End: 2023-08-07

## 2023-08-07 VITALS
DIASTOLIC BLOOD PRESSURE: 69 MMHG | TEMPERATURE: 97.1 F | RESPIRATION RATE: 16 BRPM | BODY MASS INDEX: 23.09 KG/M2 | HEART RATE: 98 BPM | OXYGEN SATURATION: 96 % | HEIGHT: 71 IN | WEIGHT: 164.9 LBS | SYSTOLIC BLOOD PRESSURE: 112 MMHG

## 2023-08-07 DIAGNOSIS — Z11.1 ENCOUNTER FOR SCREENING FOR RESPIRATORY TUBERCULOSIS: ICD-10-CM

## 2023-08-07 LAB
BACTERIA BLD CULT: NO GROWTH
BACTERIA BLD CULT: NO GROWTH
CREAT SERPL-MCNC: 1.09 MG/DL (ref 0.67–1.17)
GFR SERPL CREATININE-BSD FRML MDRD: 67 ML/MIN/1.73M2
LVEF ECHO: NORMAL

## 2023-08-07 PROCEDURE — 999N000208 ECHOCARDIOGRAM COMPLETE

## 2023-08-07 PROCEDURE — 93306 TTE W/DOPPLER COMPLETE: CPT | Mod: 26 | Performed by: INTERNAL MEDICINE

## 2023-08-07 PROCEDURE — 97116 GAIT TRAINING THERAPY: CPT | Mod: GP

## 2023-08-07 PROCEDURE — 82565 ASSAY OF CREATININE: CPT

## 2023-08-07 PROCEDURE — 36415 COLL VENOUS BLD VENIPUNCTURE: CPT

## 2023-08-07 PROCEDURE — 255N000002 HC RX 255 OP 636: Performed by: INTERNAL MEDICINE

## 2023-08-07 PROCEDURE — 250N000013 HC RX MED GY IP 250 OP 250 PS 637: Performed by: INTERNAL MEDICINE

## 2023-08-07 PROCEDURE — 97530 THERAPEUTIC ACTIVITIES: CPT | Mod: GP

## 2023-08-07 PROCEDURE — 99239 HOSP IP/OBS DSCHRG MGMT >30: CPT | Performed by: INTERNAL MEDICINE

## 2023-08-07 RX ORDER — CEPHALEXIN 500 MG/1
500 CAPSULE ORAL EVERY 8 HOURS
Qty: 3 CAPSULE | Refills: 0 | DISCHARGE
Start: 2023-08-07 | End: 2023-08-08

## 2023-08-07 RX ADMIN — HUMAN ALBUMIN MICROSPHERES AND PERFLUTREN 3 ML: 10; .22 INJECTION, SOLUTION INTRAVENOUS at 11:06

## 2023-08-07 RX ADMIN — LEVOTHYROXINE SODIUM 50 MCG: 50 TABLET ORAL at 08:56

## 2023-08-07 RX ADMIN — APIXABAN 5 MG: 5 TABLET, FILM COATED ORAL at 08:56

## 2023-08-07 RX ADMIN — CEPHALEXIN 500 MG: 500 CAPSULE ORAL at 17:05

## 2023-08-07 RX ADMIN — CEPHALEXIN 500 MG: 500 CAPSULE ORAL at 08:56

## 2023-08-07 ASSESSMENT — ACTIVITIES OF DAILY LIVING (ADL)
ADLS_ACUITY_SCORE: 42
ADLS_ACUITY_SCORE: 38
ADLS_ACUITY_SCORE: 42
ADLS_ACUITY_SCORE: 42
ADLS_ACUITY_SCORE: 38
ADLS_ACUITY_SCORE: 42
ADLS_ACUITY_SCORE: 42
ADLS_ACUITY_SCORE: 38
ADLS_ACUITY_SCORE: 38

## 2023-08-07 NOTE — PROGRESS NOTES
Care Management Follow Up    Length of Stay (days): 6    Expected Discharge Date: 08/07/2023     Concerns to be Addressed: all concerns addressed in this encounter     Patient plan of care discussed at interdisciplinary rounds: Yes    Anticipated Discharge Disposition: Transitional Care     Anticipated Discharge Services: Meals on Wheels  Anticipated Discharge DME: None    Patient/family educated on Medicare website which has current facility and service quality ratings: yes  Education Provided on the Discharge Plan:    Patient/Family in Agreement with the Plan: yes    Referrals Placed by CM/SW:    Private pay costs discussed: Not applicable    Additional Information:  Writer spoke with [facility] balbuena regarding patient's authorization status. Patient has not received authorization but [facility] will keep writer updated regarding this. Writer asked about the facility's awareness of the patient's transport time and the facility stated they were aware of the transport.    ALBAN to continue to follow for probable discharge today.     Adolfo Zapata [facility]  Care Management

## 2023-08-07 NOTE — PLAN OF CARE
Pt A&ox2-3 Disoriented to situation and time.  Forgetful. r/t to Hx alzheimer's. VSS on RA. PRN tylenol. LS clr.  Tele SB w/ BBB. Up Ax1 GB and walker. Regular diet, takes pills whole with thin liquids. PIV x1 SL. Mepilex to buttocks.   possible discharge today to [facility]. Wife at bedside overnight.

## 2023-08-07 NOTE — PLAN OF CARE
Physical Therapy Discharge Summary    Reason for therapy discharge:    Discharged to transitional care facility.    Progress towards therapy goal(s). See goals on Care Plan in [facility] electronic health record for goal details.  Goals not met.  Barriers to achieving goals:   discharge from facility.    Therapy recommendation(s):    Continued therapy is recommended.  Rationale/Recommendations:  Pt below functional baseline and is at risk for falling. Pt requires Jamil for all mobility with FWW and would benefit from continued skilled rehab to address functional mobility deficits .

## 2023-08-07 NOTE — DISCHARGE SUMMARY
[facility]  Hospitalist Discharge Summary      Date of Admission:  8/1/2023  Date of Discharge:  8/7/2023  5:44 PM  Discharging Provider: Ayse Pedersen MD  Discharge Service: Hospitalist Service    Discharge Diagnoses   Severe sepsis, unclear etiology  Dizziness, resolved  H/o paroxysmal Afib  Sins bradycardia  Alzheimer's Dementia  Hypothyroidism  CKD stage III  Dyslipidemia      Clinically Significant Risk Factors          Follow-ups Needed After Discharge   Follow-up Appointments     Follow Up and recommended labs and tests      Follow up with skilled nursing physician.  The following labs/tests are   recommended: CBC, BMP. Monitor blood pressure and HR.    Follow up with primary care provider after discharge home.            Unresulted Labs Ordered in the Past 30 Days of this Admission       No orders found from 7/2/2023 to 8/2/2023.        None    Discharge Disposition   Discharged to short-term care facility  Condition at discharge: Good    Hospital Course   Fredrick Mehta is a 82 year old male with history of Alzheimer's dementia, HTN, paroxysmal Afib who presented to the ED with his wife for evaluation of dizziness. For a detailed HPI- please refer to H&{ dome by Dr Eusebia Serrano on 08/01/2023.  Initial work-up was unremarkable; imaging of the head was negative; earlier 8/2 he started having rigors and RRT called and he was transferred to [facility]- see below..     1. Severe sepsis, unclear etiology  - He presented with dizziness; blood work on admission showed CBC- WNL.  Lactic acid 1; creatinine 1.35; LFTs WNL  - UA showed l red blood cells more than 182, white blood cells of 10, small leukocyte esterase; urine culture not done on admission  - Afebrile on admission  - 8/2 am- noted to have rigors earlier; RRT called; noted to have rectal temperature of 102.1; appreciate house LARA interventions  - Blood cultures ordered  - Demerol 12.5 mg IV x1 given  - Lactic acid returned back  elevated at 5.6; CBC with normal white blood cells  - Chest x-ray-no infiltrates  - Procalcitonin 10.94  - Received bolus 2 L lactated Ringer, followed by MIVF LR at 100cc/h  - started empirically on Zosyn and Vanco and transferred to [facility]  - repeat LA down to 1.8  - clinically improved.afebrile  - WBCs 7.1, procal down to 6.66  - Vanco stopped on 8/3, received  Zosyn for 4 days, changed to Keflex, plan to finish 7 days course of ABX  - Monitor fever curve and white blood cells trend  - cultures remains negative     2. Dizziness, resolved  - Initially thought related to possible orthostatic hypotension but possible related to possible sepsis as above  - CT head-negative  - MRI brain, MRA brain and neck with no acute pathology  - Treat as above  - fall precautions  - Tele- sinus bradycardia HR in 50's, HR not low enough to explain the dizziness, PTA Cardizem held on admission.  - Echo- EF 60-65%; no WMA; No hemodynamically significant valvular disease; Mild aortic root dilatation.  - dizziness resolved  - PT/OT- rec TCU  - the wife and the patient agree with TCU  - SW consult     3.  Paroxysmal A-fib  4.  Simus bradycardia  - EKG on admission with sinus bradycardia at the rate of 56 bpm  - PTA on Cardizem  mg p.o. twice daily-held on admission  - Tele- HR in mid 40's- mid 50's, sinus vick  - monitor for now; HR remains on lower side; would not resume Cardizem after discharge   - continue prior to admission Eliquis  - follow up with Cardiology as neede     5 Hypothyroidism  - continue PTA levothyroxine     6. Alzheimer dementia  - Resume prior to admission Namenda and Aricept  - PT/OT eval     7.  Dyslipidemia  - Resume prior to admission statin     8.  CKD stage III  - Baseline creatinine between 1.3-1.5  - Creatinine on admission 1.36, down to 1.13  - cr 1.25--1.34--1.27  - monitor cr intermittently      Consultations This Hospital Stay   PHYSICAL THERAPY ADULT IP CONSULT  OCCUPATIONAL THERAPY ADULT IP  CONSULT  PHARMACY TO DOSE VANCO  VASCULAR ACCESS ADULT IP CONSULT  WOUND OSTOMY CONTINENCE NURSE  IP CONSULT  CARE MANAGEMENT / SOCIAL WORK IP CONSULT  PHYSICAL THERAPY ADULT IP CONSULT  OCCUPATIONAL THERAPY ADULT IP CONSULT    Code Status   Full Code    Time Spent on this Encounter   I, Ayse Pedersen MD, personally saw the patient today and spent greater than 30 minutes discharging this patient.       Ayse Pedersen MD  [facility]  [address] [place] [address]  Phone: [phone]  ______________________________________________________________________    Physical Exam   Vital Signs: Temp: 97.1  F (36.2  C) Temp src: Oral BP: 112/69 Pulse: 58   Resp: 16 SpO2: 96 % O2 Device: None (Room air)    Weight: 164 lbs 14.47 oz     GENERAL: awake, alert, NAD  HEENT: Anicteric sclera. PERRL  CV: RRR. S1, S2. No murmurs appreciated.   RESPIRATORY: bilateral air entry, no crackles, no rales  GI: abd- soft, nonT, nonD, BS present   MUSCULOSKELETAL: No joint swelling or tenderness. Moves all extremities.   NEUROLOGICAL: awake, alert, oriented to self and place and partially to time, no motor deficits noted  PSYCH: normal mood, pleasant   EXTREMITIES: No peripheral edema. Intact bilateral pedal pulses.   SKIN: No jaundice. No rashes.        Primary Care Physician   Pieter Ulloa MD    Discharge Orders      General info for SNF    Length of Stay Estimate: Short Term Care: Estimated # of Days <30  Condition at Discharge: Improving  Level of care:skilled   Rehabilitation Potential: Fair  Admission H&P remains valid and up-to-date: Yes  Recent Chemotherapy: N/A  Use Nursing Home Standing Orders: Yes     Mantoux instructions    Give two-step Mantoux (PPD) Per Facility Policy Yes     Reason for your hospital stay    Dizziness  Sepsis     Activity - Up with nursing assistance     Follow Up and recommended labs and tests    Follow up with long term physician.  The following  labs/tests are recommended: CBC, BMP. Monitor blood pressure and HR.    Follow up with primary care provider after discharge home.     Full Code     Physical Therapy Adult Consult    Evaluate and treat as clinically indicated.    Reason:  Deconditioning     Occupational Therapy Adult Consult    Evaluate and treat as clinically indicated.    Reason:  Deconditioning     Fall precautions     Diet    Follow this diet upon discharge: Orders Placed This Encounter      Regular Diet Adult       Significant Results and Procedures   Most Recent 3 CBC's:Recent Labs   Lab Test 08/09/23  0845 08/04/23  0524 08/03/23  0510 08/02/23  1118   WBC 7.5  --  7.1 8.6   HGB 13.7 12.2* 11.4* 13.6   MCV 96  --  93 95     --  129* 170     Most Recent 3 BMP's:Recent Labs   Lab Test 08/09/23  0845 08/07/23  0514 08/06/23  0534 08/05/23  0532 08/04/23  0524 08/03/23  0510     --   --   --  141 139   POTASSIUM 3.8  --   --   --  3.9 3.8   CHLORIDE 105  --   --   --  105 107   CO2 26  --   --   --  26 23   BUN 22.2  --   --   --  12.1 18.4   CR 1.13 1.09 1.27*   < > 1.34* 1.25*   ANIONGAP 11  --   --   --  10 9   SORIN 9.1  --   --   --  8.8 8.2*   GLC 70  --   --   --  86 97    < > = values in this interval not displayed.     Most Recent 2 LFT's:Recent Labs   Lab Test 08/01/23  1320 01/10/23  1649 07/16/22  2036   AST 22  --  40   ALT 13 22 42   ALKPHOS 84  --  73   BILITOTAL 0.5  --  0.5     Most Recent 3 INR's:Recent Labs   Lab Test 10/12/20  1503 10/01/20  1423 09/17/20  1425   INR 1.70* 3.00* 3.90*     Most Recent 3 Creatinines:Recent Labs   Lab Test 08/09/23  0845 08/07/23  0514 08/06/23  0534   CR 1.13 1.09 1.27*     Most Recent 3 Hemoglobins:Recent Labs   Lab Test 08/09/23  0845 08/04/23  0524 08/03/23  0510   HGB 13.7 12.2* 11.4*     Most Recent 3 Troponin's:No lab results found.  Most Recent 3 BNP's:No lab results found.  7-Day Micro Results     Collected Updated Procedure Result Status      08/08/2023 0515 08/10/2023 1002  Quantiferon TB Gold Plus Grey Tube [[id number]]   Peripheral Blood    Final result Component Value Units   Quantiferon Nil Tube 0.05 IU/mL            08/08/2023 0515 08/10/2023 1003 Quantiferon TB Gold Plus Green Tube [[id number]]   Peripheral Blood    Final result Component Value Units   Quantiferon TB1 Tube 0.05 IU/mL            08/08/2023 0515 08/10/2023 1002 Quantiferon TB Gold Plus Yellow Tube [[id number]]   Peripheral Blood    Final result Component Value   Quantiferon TB2 Tube 0.04            08/08/2023 0515 08/10/2023 1003 Quantiferon TB Gold Plus Purple Tube [[id number]]   Peripheral Blood    Final result Component Value Units   Quantiferon Mitogen 10.00 IU/mL            08/08/2023 0515 08/10/2023 1139 Quantiferon TB Gold Plus [[id number]]   Peripheral Blood    Final result Component Value Units   Quantiferon-TB Gold Plus Negative    No interferon gamma response to M.tuberculosis antigens was detected. Infection with M.tuberculosis is unlikely, however a single negative result does not exclude infection. In patients at high risk for infection, a second test should be considered in accordance with the 2017 ATS/IDSA/CDC Clinical Pract  ice Guidelines for Diagnosis of Tuberculosis in Adults and Children    TB1 Ag minus Nil Value 0.00 IU/mL   TB2 Ag minus Nil Value -0.01 IU/mL   Mitogen minus Nil Result 9.95 IU/mL   Nil Result 0.05 IU/mL                Most Recent TSH and T4:Recent Labs   Lab Test 01/10/23  1649   TSH 1.54     Most Recent Hemoglobin A1c:No lab results found.  Most Recent 6 glucoses:Recent Labs   Lab Test 08/09/23  0845 08/04/23  0524 08/03/23  0510 08/02/23  1142 08/02/23  0617 08/01/23  1320   GLC 70 86 97 155* 89 106*     Most Recent Urinalysis:Recent Labs   Lab Test 08/02/23  1813   COLOR Straw   APPEARANCE Clear   URINEGLC Negative   URINEBILI Negative   URINEKETONE Negative   SG 1.005   UBLD Large*   URINEPH 6.5   PROTEIN Negative   NITRITE Negative   LEUKEST Negative   RBCU 17*    WBCU 6*   ,   Results for orders placed or performed during the hospital encounter of 08/01/23   MR Brain w/o & w Contrast    Narrative    EXAM: MR BRAIN W/O and W CONTRAST, MRA NECK (CAROTIDS) W/O and W CONTRAST  LOCATION: [facility]  DATE: 8/1/2023    INDICATION: persistent dizziness  COMPARISON: CT head 08/01/2023  CONTRAST: 10 mL Gadavist  TECHNIQUE:   1) Routine multiplanar multisequence head MRI without and with intravenous contrast.  2) 3D time-of-flight head MRA without intravenous contrast.  3) Neck MRA without and with IV contrast. Stenosis measurements made according to NASCET criteria unless otherwise specified.    FINDINGS:  HEAD MRI:  INTRACRANIAL CONTENTS: No acute or subacute infarct. No mass, acute hemorrhage, or extra-axial fluid collections. Scattered nonspecific T2/FLAIR hyperintensities within the cerebral white matter most consistent with mild chronic microvascular ischemic   change. Moderate generalized cerebral atrophy. No hydrocephalus. Atrophy is more prominent in hippocampal regions, more so on right. This is a nonspecific finding, similar finding could be observed in dementia of Alzheimer's type. Normal position of the   cerebellar tonsils. No pathologic contrast enhancement.    SELLA: No abnormality accounting for technique.    OSSEOUS STRUCTURES/SOFT TISSUES: Normal marrow signal. The major intracranial vascular flow voids are maintained.     ORBITS: No abnormality accounting for technique.     SINUSES/MASTOIDS: No paranasal sinus mucosal disease. No middle ear or mastoid effusion.     HEAD MRA:   ANTERIOR CIRCULATION: No stenosis/occlusion, aneurysm, or high flow vascular malformation. Standard [place] anatomy.    POSTERIOR CIRCULATION: No stenosis/occlusion, aneurysm, or high flow vascular malformation. Dominant left and smaller right vertebral artery contribute to a normal basilar artery.     NECK MRA:   RIGHT CAROTID: No measurable stenosis or  dissection.    LEFT CAROTID: No measurable stenosis or dissection.    VERTEBRAL ARTERIES: No focal stenosis or dissection. Dominant left and smaller right vertebral arteries.    AORTIC ARCH: Classic aortic arch anatomy with no significant stenosis at the origin of the great vessels.      Impression    IMPRESSION:  HEAD MRI:  1.  No acute infarct, mass, mass effect, or hemorrhage.  2.  Mild chronic small vessel ischemia.  3.  Moderate atrophy.    HEAD MRA:  Normal MRA [place].    NECK MRA:  Normal neck MRA.     MRA Brain ([place]) wo Contrast    Narrative    EXAM: MRA BRAIN ([place]) W/O CONTRAST  LOCATION: [facility]  DATE: 8/1/2023    INDICATION: Persistent dizziness.  COMPARISON: None.  TECHNIQUE: 3D time-of-flight head MRA without intravenous contrast.    FINDINGS:  ANTERIOR CIRCULATION: No high-grade stenosis, occlusion, aneurysm, or high flow vascular malformation. Standard [place] anatomy.    POSTERIOR CIRCULATION: No high-grade stenosis, occlusion, aneurysm, or high flow vascular malformation. A slightly congenitally smaller right vertebral artery.     OTHER: Moderate generalized cerebral parenchymal volume loss with disproportionate involvement of the right mesial temporal lobe.      Impression    IMPRESSION:  1.  Unremarkable MRA [place].   CT Head w/o Contrast    Narrative    CT SCAN OF THE HEAD WITHOUT CONTRAST August 1, 2023 3:01 PM     HISTORY: Dizzy.    TECHNIQUE: Axial images of the head and coronal reformations without  IV contrast material. Radiation dose for this scan was reduced using  automated exposure control, adjustment of the mA and/or kV according  to patient size, or iterative reconstruction technique.    COMPARISON: None.    FINDINGS: No acute intracranial hemorrhage. No mass effect or midline  shift. Mild diffuse parenchymal volume loss. Ventricles are prominent  in size but are not significantly enlarged out of  portion of the  cerebral sulci. Patchy periventricular white matter hypodensities are  nonspecific, but most likely related to chronic microvascular ischemic  disease.    The visualized portions of the sinuses and mastoids appear normal. The  bony calvarium and bones of the skull base appear intact.       Impression    IMPRESSION:     1. No evidence of acute intracranial hemorrhage, mass, or herniation.  2. Mild diffuse parenchymal volume loss and white matter changes  likely due to chronic microvascular ischemic disease.      IGNACIA LYNCH MD         SYSTEM ID:  [id number]   XR Knee Bilateral 1/2 Views    Narrative    KNEE ONE-TWO VIEWS BILATERAL  8/1/2023 3:32 PM     HISTORY: Pain  COMPARISON: None.      Impression    IMPRESSION:     Right knee: No acute fracture or malalignment. Mild patellofemoral  compartment degenerative changes. No knee joint effusion. Osteopenia.    Left knee: No acute fracture or malalignment. Mild patellofemoral  compartment degenerative changes. No knee joint effusion. Screw  fixation in the distal femur. Osteopenia.    RAMILA VALENCIA MD         SYSTEM ID:  [id number]   MRA Neck (Carotids) wo & w Contrast    Narrative    EXAM: MR BRAIN W/O and W CONTRAST, MRA NECK (CAROTIDS) W/O and W CONTRAST  LOCATION: [facility]  DATE: 8/1/2023    INDICATION: persistent dizziness  COMPARISON: CT head 08/01/2023  CONTRAST: 10 mL Gadavist  TECHNIQUE:   1) Routine multiplanar multisequence head MRI without and with intravenous contrast.  2) 3D time-of-flight head MRA without intravenous contrast.  3) Neck MRA without and with IV contrast. Stenosis measurements made according to NASCET criteria unless otherwise specified.    FINDINGS:  HEAD MRI:  INTRACRANIAL CONTENTS: No acute or subacute infarct. No mass, acute hemorrhage, or extra-axial fluid collections. Scattered nonspecific T2/FLAIR hyperintensities within the cerebral white matter most consistent with mild chronic  microvascular ischemic   change. Moderate generalized cerebral atrophy. No hydrocephalus. Atrophy is more prominent in hippocampal regions, more so on right. This is a nonspecific finding, similar finding could be observed in dementia of Alzheimer's type. Normal position of the   cerebellar tonsils. No pathologic contrast enhancement.    SELLA: No abnormality accounting for technique.    OSSEOUS STRUCTURES/SOFT TISSUES: Normal marrow signal. The major intracranial vascular flow voids are maintained.     ORBITS: No abnormality accounting for technique.     SINUSES/MASTOIDS: No paranasal sinus mucosal disease. No middle ear or mastoid effusion.     HEAD MRA:   ANTERIOR CIRCULATION: No stenosis/occlusion, aneurysm, or high flow vascular malformation. Standard [place] anatomy.    POSTERIOR CIRCULATION: No stenosis/occlusion, aneurysm, or high flow vascular malformation. Dominant left and smaller right vertebral artery contribute to a normal basilar artery.     NECK MRA:   RIGHT CAROTID: No measurable stenosis or dissection.    LEFT CAROTID: No measurable stenosis or dissection.    VERTEBRAL ARTERIES: No focal stenosis or dissection. Dominant left and smaller right vertebral arteries.    AORTIC ARCH: Classic aortic arch anatomy with no significant stenosis at the origin of the great vessels.      Impression    IMPRESSION:  HEAD MRI:  1.  No acute infarct, mass, mass effect, or hemorrhage.  2.  Mild chronic small vessel ischemia.  3.  Moderate atrophy.    HEAD MRA:  Normal MRA [place].    NECK MRA:  Normal neck MRA.     XR Chest Port 1 View    Narrative    CHEST ONE VIEW  8/2/2023 11:25 AM     HISTORY: Rigors.    COMPARISON: July 16, 2022      Impression    IMPRESSION: No acute disease.     MARINA HURT MD         SYSTEM ID:  [id number]   Echocardiogram Complete     Value    LVEF  60-65%    Narrative    [id number]  [id number]  [id number]  [id number]^BLADIMIR^HORACIO^ESTEBAN     [facility]  [facility]  Echocardiography Laboratory  [address] [place] [address]     Name: CAITLYN MCCLURE  MRN: [id number]  : 1940  Study Date: 2023 10:11 AM  Age: 82 yrs  Gender: Male  Patient Location: [facility]  Reason For Study: Dizziness  Ordering Physician: HORACIO GARRISON  Referring Physician: MARVEL MORRISON  Performed By: Kady Martinez     BSA: 1.9 m2  Height: 71 in  Weight: 164 lb  HR: 60  BP: 144/76 mmHg  ______________________________________________________________________________  Procedure  Complete Echo Adult. Limited Portable Echo Adult.  ______________________________________________________________________________  Interpretation Summary     1. Normal biventricular size and function. Left ventricular ejection fraction  of 60-65%.  2. No segmental wall motion abnormalities noted.  3. No hemodynamically significant valvular disease.  4. Mild aortic root dilatation. The ascending aorta is Mildly dilated.  Compared to the previous echocardiogram, there are no significant changes.  ______________________________________________________________________________  Left Ventricle  The left ventricle is normal in size. There is normal left ventricular wall  thickness. Left ventricular systolic function is normal. The visual ejection  fraction is 60-65%. Grade I or early diastolic dysfunction. No regional wall  motion abnormalities noted.     Right Ventricle  The right ventricle is normal in size and function.     Atria  Normal left atrial size. Right atrial size is normal.     Mitral Valve  The mitral valve leaflets appear normal. There is no evidence of stenosis,  fluttering, or prolapse. There is trace mitral regurgitation.     Tricuspid Valve  Normal tricuspid valve. There is trace tricuspid regurgitation. The right  ventricular systolic pressure is approximated at 18mmHg plus the right atrial  pressure.     Aortic Valve  There is mild trileaflet aortic sclerosis. There is trace  aortic  regurgitation. No aortic stenosis is present.     Pulmonic Valve  The pulmonic valve is not well visualized.     Vessels  Mild aortic root dilatation. The ascending aorta is Mildly dilated. IVC  diameter <2.1 cm collapsing >50% with sniff suggests a normal RA pressure of 3  mmHg.     Pericardium  There is no pericardial effusion.     Rhythm  Sinus rhythm was noted.  ______________________________________________________________________________  MMode/2D Measurements & Calculations  IVSd: 1.1 cm     LVIDd: 4.1 cm  LVIDs: 2.8 cm  LVPWd: 0.90 cm  FS: 32.1 %  LV mass(C)d: 134.0 grams  LV mass(C)dI: 69.1 grams/m2  Ao root diam: 3.9 cm  asc Aorta Diam: 3.9 cm  LVOT diam: 2.2 cm  LVOT area: 3.8 cm2  LA Volume (BP): 25.0 ml  LA Volume Index (BP): 12.9 ml/m2  RWT: 0.44     Doppler Measurements & Calculations  MV E max peng: 38.1 cm/sec  MV A max peng: 63.8 cm/sec  MV E/A: 0.60  MV dec time: 0.32 sec  Ao V2 max: 90.7 cm/sec  Ao max PG: 3.0 mmHg  Ao V2 mean: 60.8 cm/sec  Ao mean P.0 mmHg  Ao V2 VTI: 20.5 cm  MEY(I,D): 2.1 cm2  MEY(V,D): 2.6 cm2  LV V1 max P.6 mmHg  LV V1 max: 63.0 cm/sec  LV V1 VTI: 11.5 cm  SV(LVOT): 43.7 ml  SI(LVOT): 22.6 ml/m2  PA V2 max: 77.6 cm/sec  PA max P.4 mmHg  PA acc time: 0.18 sec  AV Peng Ratio (DI): 0.69  MEY Index (cm2/m2): 1.1  E/E' av.9  Lateral E/e': 5.6  Medial E/e': 8.1  RV S Peng: 9.7 cm/sec     ______________________________________________________________________________  Report approved by: Marco Ramirez 2023 02:32 PM             Discharge Medications   Current Discharge Medication List        START taking these medications    Details   cephALEXin (KEFLEX) 500 MG capsule Take 1 capsule (500 mg) by mouth every 8 hours for 1 day  Qty: 3 capsule, Refills: 0    Associated Diagnoses: Sepsis without acute organ dysfunction, due to unspecified organism (H)           CONTINUE these medications which have CHANGED    Details   acetaminophen (TYLENOL) 325 MG tablet  Take 1-2 tablets (325-650 mg) by mouth every 6 hours as needed for mild pain    Associated Diagnoses: Sepsis without acute organ dysfunction, due to unspecified organism (H)           CONTINUE these medications which have NOT CHANGED    Details   apixaban ANTICOAGULANT (ELIQUIS) 5 MG tablet Take 1 tablet (5 mg) by mouth 2 times daily  Qty: 14 tablet, Refills: 0    Comments: Short term supply per wife request. Requesting a delivery to their home  Associated Diagnoses: Paroxysmal atrial fibrillation (H)      Cetirizine HCl (ZYRTEC ALLERGY PO) Take  by mouth daily as needed.      latanoprost (XALATAN) 0.005 % ophthalmic solution Place 1 drop into both eyes At Bedtime      levothyroxine (SYNTHROID/LEVOTHROID) 50 MCG tablet Take 1 tablet (50 mcg) by mouth daily  Qty: 90 tablet, Refills: 4    Associated Diagnoses: Hypothyroidism, unspecified type      memantine (NAMENDA) 10 MG tablet Take 10 mg by mouth At Bedtime      !! pravastatin (PRAVACHOL) 10 MG tablet Take 1 tablet (10 mg) by mouth daily  Qty: 90 tablet, Refills: 3    Associated Diagnoses: Mixed hyperlipidemia; Hypercholesterolemia      !! pravastatin (PRAVACHOL) 20 MG tablet TAKE ONE TABLET BY MOUTH EVERY EVENING. TAKE TOGETHER WITH 10MG  Qty: 90 tablet, Refills: 3    Associated Diagnoses: Mixed hyperlipidemia; Hypercholesterolemia      donepezil (ARICEPT) 10 MG tablet Take 1 tablet (10 mg) by mouth At Bedtime  Qty: 30 tablet, Refills: 0    Associated Diagnoses: Memory loss       !! - Potential duplicate medications found. Please discuss with provider.        STOP taking these medications       diltiazem ER COATED BEADS (CARDIZEM CD/CARTIA XT) 180 MG 24 hr capsule Comments:   Reason for Stopping:         IBUPROFEN PO Comments:   Reason for Stopping:             Allergies   Allergies   Allergen Reactions     Hay Fever & [A.R.M.]      Latex      Seasonal Allergies

## 2023-08-07 NOTE — PROGRESS NOTES
Care Management Discharge Note    Discharge Date: 08/07/2023       Discharge Disposition: Transitional Care    Discharge Services: Meals on Wheels    Discharge DME: None    Discharge Transportation: agency    Private pay costs discussed: Not applicable    Does the patient's insurance plan have a 3 day qualifying hospital stay waiver?  Yes   Will the waiver be used for post-acute placement? No    PAS Confirmation Code: [id number]  Patient/family educated on Medicare website which has current facility and service quality ratings: yes    Education Provided on the Discharge Plan:    Persons Notified of Discharge Plans: Bedside nurse, CARMEN, Charge Nurse, MD, Patient, and facility.  Patient/Family in Agreement with the Plan: yes    Handoff Referral Completed: No    Additional Information:  Writer spoke with doctor who has put in orders for discharge. Patient will be going to [facility]. Orders have been sent to facility, PAS has been completed and sent to facility, PAS is present on front of charge. No scripts are needed for the patient. Patient will be using [facility] wheelchair transport. Transportation time is 4:30 to 5:30. Patient and patient's wife are in agreement. Patient will discharge today to [facility].    Adolfo Zapata [facility]  Care Management

## 2023-08-08 ENCOUNTER — PATIENT OUTREACH (OUTPATIENT)
Dept: CARE COORDINATION | Facility: CLINIC | Age: 83
End: 2023-08-08

## 2023-08-08 ENCOUNTER — LAB REQUISITION (OUTPATIENT)
Dept: LAB | Facility: CLINIC | Age: 83
End: 2023-08-08

## 2023-08-08 ENCOUNTER — TRANSITIONAL CARE UNIT VISIT (OUTPATIENT)
Dept: GERIATRICS | Facility: CLINIC | Age: 83
End: 2023-08-08
Payer: COMMERCIAL

## 2023-08-08 VITALS
TEMPERATURE: 98 F | RESPIRATION RATE: 18 BRPM | OXYGEN SATURATION: 95 % | SYSTOLIC BLOOD PRESSURE: 153 MMHG | DIASTOLIC BLOOD PRESSURE: 74 MMHG | HEIGHT: 71 IN | HEART RATE: 54 BPM | BODY MASS INDEX: 23 KG/M2

## 2023-08-08 DIAGNOSIS — E03.9 HYPOTHYROIDISM, UNSPECIFIED TYPE: ICD-10-CM

## 2023-08-08 DIAGNOSIS — G30.9 ALZHEIMER'S DISEASE, UNSPECIFIED (CODE) (H): ICD-10-CM

## 2023-08-08 DIAGNOSIS — R00.1 BRADYCARDIA: ICD-10-CM

## 2023-08-08 DIAGNOSIS — R42 DIZZINESS: ICD-10-CM

## 2023-08-08 DIAGNOSIS — I10 HYPERTENSION, UNSPECIFIED TYPE: ICD-10-CM

## 2023-08-08 DIAGNOSIS — A41.9 SEPSIS WITHOUT ACUTE ORGAN DYSFUNCTION, DUE TO UNSPECIFIED ORGANISM (H): Primary | ICD-10-CM

## 2023-08-08 DIAGNOSIS — E78.5 DYSLIPIDEMIA: ICD-10-CM

## 2023-08-08 DIAGNOSIS — N18.30 STAGE 3 CHRONIC KIDNEY DISEASE, UNSPECIFIED WHETHER STAGE 3A OR 3B CKD (H): ICD-10-CM

## 2023-08-08 DIAGNOSIS — D64.9 ANEMIA, UNSPECIFIED: ICD-10-CM

## 2023-08-08 DIAGNOSIS — D64.9 ANEMIA, UNSPECIFIED TYPE: ICD-10-CM

## 2023-08-08 DIAGNOSIS — I48.0 PAROXYSMAL ATRIAL FIBRILLATION (H): ICD-10-CM

## 2023-08-08 DIAGNOSIS — Z71.89 ADVANCED DIRECTIVES, COUNSELING/DISCUSSION: ICD-10-CM

## 2023-08-08 DIAGNOSIS — R53.81 PHYSICAL DECONDITIONING: ICD-10-CM

## 2023-08-08 PROCEDURE — 36415 COLL VENOUS BLD VENIPUNCTURE: CPT | Performed by: NURSE PRACTITIONER

## 2023-08-08 PROCEDURE — 99309 SBSQ NF CARE MODERATE MDM 30: CPT | Performed by: NURSE PRACTITIONER

## 2023-08-08 PROCEDURE — 86481 TB AG RESPONSE T-CELL SUSP: CPT | Performed by: NURSE PRACTITIONER

## 2023-08-08 NOTE — PROGRESS NOTES
[facility]    Background: Transitional Care Management program identified per system criteria and reviewed by Connected Care Resource Center team for possible outreach.    Assessment: Upon chart review, CCRC Team member will not proceed with patient outreach related to this episode of Transitional Care Management program due to reason below:    Non-[facility] TCU: CCRC team member noted patient discharged to TCU/ARU/LTACH. Patient is not established with a [facility] currently supported by Primary Care-Care Coordination therefore handoff to Primary Care-Care Coordination is not appropriate at this time.    Plan: Transitional Care Management episode addressed appropriately per reason noted above.      Chelsea Jimenez MA  Connected Care Resource Center, [facility]    *Connected Care Resource Team does NOT follow patient ongoing. Referrals are identified based on internal discharge reports and the outreach is to ensure patient has an understanding of their discharge instructions.

## 2023-08-08 NOTE — PROGRESS NOTES
[facility]ER[facility]    PRIMARY CARE PROVIDER AND CLINIC:  Pieter Ulloa MD, MD, [address] [place] [address] / [facility] [id number]  Chief Complaint   Patient presents with    Hospital F/U      [place] Medical Record Number:  [id number]  Place of Service where encounter took place:  [facility] (TCU) [[id number]]    Fredrick Mehta  is a 82 year old  (1940), admitted to the above facility from  [facility]. Hospital stay 8/1/23 through 8/7/23..   HPI:    82 year old male PMH Alzheimer's dementia, HTN, PAF hospitalized with dizziness. Developed sepsis unknown etiology. Work up negative, treated with fluids, zosyn and vanco, procalcitonin 10.94 down to 6.66.  BC NGTD. Dizziness: imaging negative, resolved. PAF and sinus bradycardia: on Eliquis, Cardizem stopped. Hypothyroid on synthroid. Dementia on Namenda and Aricept. HLD on statin. CKD3 with cr 1.3-1.5 at baseline, stable. To TCU For rehab.     Seen for initial TCU visit. Forgetful and oriented to self only. Asks to be Full Code - consistent with admission orders. No headaches, dizziness, chest pain, dyspnea, bowel or bladder issues. BP range 134-165/70-77 and sats 95% room air. HR 50s. Reports lives in own home with wife.     CODE STATUS/ADVANCE DIRECTIVES DISCUSSION:  Full Code  CPR/Full code   ALLERGIES:   Allergies   Allergen Reactions    Hay Fever & [A.R.M.]     Latex     Seasonal Allergies       PAST MEDICAL HISTORY:   Past Medical History:   Diagnosis Date    Anatomical narrow angle     Benign positional vertigo     Cataracts     Glaucoma     OAG    Hypercholesterolemia     Migraine     Paroxysmal atrial fibrillation (H)       PAST SURGICAL HISTORY:   has a past surgical history that includes Prostate surgery (1999) and Selective Laser Trabeculoplasty (SLT) OD (right eye) (6/2007).  FAMILY HISTORY: family history includes Glaucoma in his maternal grandfather.  SOCIAL HISTORY:   reports that he quit smoking about 37 years  "ago. His smoking use included cigarettes. He started smoking about 65 years ago. He has a 28.00 pack-year smoking history. He has never used smokeless tobacco. He reports current alcohol use. He reports that he does not use drugs.  Patient's living condition: lives with spouse    Post Discharge Medication Reconciliation Status:   MED REC REQUIRED  Post Medication Reconciliation Status:  Discharge medications reconciled, continue medications without change    Current Outpatient Medications   Medication Sig    acetaminophen (TYLENOL) 325 MG tablet Take 1-2 tablets (325-650 mg) by mouth every 6 hours as needed for mild pain (Patient taking differently: Take 650 mg by mouth every 6 hours as needed for mild pain)    apixaban ANTICOAGULANT (ELIQUIS) 5 MG tablet Take 1 tablet (5 mg) by mouth 2 times daily    Cetirizine HCl (ZYRTEC ALLERGY PO) Take 10 mg by mouth daily as needed    donepezil (ARICEPT) 10 MG tablet Take 1 tablet (10 mg) by mouth At Bedtime    latanoprost (XALATAN) 0.005 % ophthalmic solution Place 1 drop into both eyes At Bedtime    levothyroxine (SYNTHROID/LEVOTHROID) 50 MCG tablet Take 1 tablet (50 mcg) by mouth daily    memantine (NAMENDA) 10 MG tablet Take 10 mg by mouth At Bedtime    pravastatin (PRAVACHOL) 10 MG tablet Take 1 tablet (10 mg) by mouth daily    pravastatin (PRAVACHOL) 20 MG tablet TAKE ONE TABLET BY MOUTH EVERY EVENING. TAKE TOGETHER WITH 10MG     No current facility-administered medications for this visit.       ROS:  10 point ROS of systems including Constitutional, Eyes, Respiratory, Cardiovascular, Gastroenterology, Genitourinary, Integumentary, Musculoskeletal, Psychiatric were all negative except for pertinent positives noted in my HPI.    Vitals:  BP (!) 153/74   Pulse 54   Temp 98  F (36.7  C)   Resp 18   Ht 1.803 m (5' 11\")   SpO2 95%   BMI 23.00 kg/m    Exam:  GENERAL APPEARANCE:  Alert, in no distress, pleasant, cooperative, oriented x self only  EYES:  EOM, lids, pupils " and irises normal, sclera clear and conjunctiva normal, no discharge or mattering on lids or lashes noted  ENT:  Mouth normal, moist mucous membranes, nose normal without drainage or crusting, external ears without lesions, hearing acuity intact  NECK: supple, symmetrical, trachea midline  RESP:  respiratory effort normal, no chest wall tenderness, no respiratory distress, Lung sounds clear, patient is on room air  CV:  Auscultation of heart done, rate and rhythm regular and bradycardic today, no murmur, no rub or gallop. Edema none bilateral lower extremities  ABDOMEN:  normal bowel sounds, soft, nontender, no palpable masses.  M/S:   Gait and station unsafe without assistance, no tenderness or swelling of the joints; able to move all extremities, digits normal  SKIN:  Inspection and palpation of skin and subcutaneous tissue: skin on extremities warm, dry and intact without rashes  NEURO: cranial nerves 2-12 grossly intact, no facial asymmetry, no speech deficits and able to follow directions, moves all extremities symmetrically  PSYCH:  insight and judgement and memory impaired, affect and mood normal     Lab/Diagnostic data:  Most Recent 3 CBC's:  Recent Labs   Lab Test 08/04/23  0524 08/03/23  0510 08/02/23  1118 08/01/23  1320   WBC  --  7.1 8.6 7.4   HGB 12.2* 11.4* 13.6 14.8   MCV  --  93 95 94   PLT  --  129* 170 202     Most Recent 3 BMP's:  Recent Labs   Lab Test 08/07/23  0514 08/06/23  0534 08/05/23  0532 08/04/23  0524 08/03/23  0510 08/02/23  1142 08/02/23  0617   NA  --   --   --  141 139  --  138   POTASSIUM  --   --   --  3.9 3.8  --  4.5   CHLORIDE  --   --   --  105 107  --  107   CO2  --   --   --  26 23  --  22   BUN  --   --   --  12.1 18.4  --  14.9   CR 1.09 1.27* 1.32* 1.34* 1.25*  --  1.13   ANIONGAP  --   --   --  10 9  --  9   SORIN  --   --   --  8.8 8.2*  --  8.5*   GLC  --   --   --  86 97 155* 89     Most Recent TSH and T4:  Recent Labs   Lab Test 01/10/23  1649   TSH 1.54        ASSESSMENT/PLAN:  Sepsis without acute organ dysfunction, due to unspecified organism (H)  Acute, resolved with antibiotics. Monitor fever and for any s/s infection. Check CBC on 8/9.     Hypertension, unspecified type  Paroxysmal atrial fibrillation (H)  Bradycardia  Chronic, stable. Continue apixaban 5 mg BID, monitor vs and review ranges next visit. Now off meds.     Hypothyroidism, unspecified type  Continue PTA synthroid 50 mcg daily, check TSH per home routine.     Alzheimer's disease, unspecified (CODE) (H)  Chronic. Continue Aricept 10 mg HS, Namenda 10 mg HS, monitor for safety and f/u with formal cognitive test results.     Dyslipidemia  Continue PTA Pravachol 30 mg daily    Stage 3 chronic kidney disease, unspecified whether stage 3a or 3b CKD (H)  Chronic. Recent Cr 1.09 on 8.7. Check BMP on 8/9 and avoid nephrotoxins.     Anemia, unspecified type  Note Hgb 12.2 on 8/4. Check CBC on 8/9.     Physical deconditioning  Dizziness  Acute on chronic. Dizziness resolved. Continue tylenol 650 mg QID PRN pain, therapies as ordered and f/u progress.     Advanced directives, counseling/discussion  Asks to be Full Code    Orders:  Full Code  CBC and BMP 8/9 diagnosis anemia, DEL    Electronically signed by:  SAMUEL Cortez CNP

## 2023-08-08 NOTE — PLAN OF CARE
Occupational Therapy Discharge Summary    Reason for therapy discharge:    Discharged to transitional care facility.    Progress towards therapy goal(s). See goals on Care Plan in [facility] electronic health record for goal details.  Goals partially met.  Barriers to achieving goals:   discharge from facility.    Therapy recommendation(s):    Continued therapy is recommended.  Rationale/Recommendations:  Continued skilled OT at TCU to progress safety and independence with I/ADLs.

## 2023-08-09 LAB
ANION GAP SERPL CALCULATED.3IONS-SCNC: 11 MMOL/L (ref 7–15)
BUN SERPL-MCNC: 22.2 MG/DL (ref 8–23)
CALCIUM SERPL-MCNC: 9.1 MG/DL (ref 8.8–10.2)
CHLORIDE SERPL-SCNC: 105 MMOL/L (ref 98–107)
CREAT SERPL-MCNC: 1.13 MG/DL (ref 0.67–1.17)
DEPRECATED HCO3 PLAS-SCNC: 26 MMOL/L (ref 22–29)
ERYTHROCYTE [DISTWIDTH] IN BLOOD BY AUTOMATED COUNT: 13.1 % (ref 10–15)
GFR SERPL CREATININE-BSD FRML MDRD: 64 ML/MIN/1.73M2
GLUCOSE SERPL-MCNC: 70 MG/DL (ref 70–99)
HCT VFR BLD AUTO: 42.5 % (ref 40–53)
HGB BLD-MCNC: 13.7 G/DL (ref 13.3–17.7)
MCH RBC QN AUTO: 30.8 PG (ref 26.5–33)
MCHC RBC AUTO-ENTMCNC: 32.2 G/DL (ref 31.5–36.5)
MCV RBC AUTO: 96 FL (ref 78–100)
PLATELET # BLD AUTO: 216 10E3/UL (ref 150–450)
POTASSIUM SERPL-SCNC: 3.8 MMOL/L (ref 3.4–5.3)
RBC # BLD AUTO: 4.45 10E6/UL (ref 4.4–5.9)
SODIUM SERPL-SCNC: 142 MMOL/L (ref 136–145)
WBC # BLD AUTO: 7.5 10E3/UL (ref 4–11)

## 2023-08-09 PROCEDURE — 80048 BASIC METABOLIC PNL TOTAL CA: CPT | Performed by: NURSE PRACTITIONER

## 2023-08-09 PROCEDURE — P9604 ONE-WAY ALLOW PRORATED TRIP: HCPCS | Performed by: NURSE PRACTITIONER

## 2023-08-09 PROCEDURE — 85027 COMPLETE CBC AUTOMATED: CPT | Performed by: NURSE PRACTITIONER

## 2023-08-09 PROCEDURE — 36415 COLL VENOUS BLD VENIPUNCTURE: CPT | Performed by: NURSE PRACTITIONER

## 2023-08-10 LAB
GAMMA INTERFERON BACKGROUND BLD IA-ACNC: 0.05 IU/ML
M TB IFN-G BLD-IMP: NEGATIVE
M TB IFN-G CD4+ BCKGRND COR BLD-ACNC: 9.95 IU/ML
MITOGEN IGNF BCKGRD COR BLD-ACNC: -0.01 IU/ML
MITOGEN IGNF BCKGRD COR BLD-ACNC: 0 IU/ML
QUANTIFERON MITOGEN: 10 IU/ML
QUANTIFERON NIL TUBE: 0.05 IU/ML
QUANTIFERON TB1 TUBE: 0.05 IU/ML
QUANTIFERON TB2 TUBE: 0.04

## 2023-08-14 NOTE — PROGRESS NOTES
[facility]  INITIAL VISIT NOTE  August 15, 2023    PRIMARY CARE PROVIDER AND CLINIC:  Pieter Ulloa [address] [place] [address] / [facility] [id number]    CHIEF COMPLAINT:  Hospital follow-up/Initial visit    HPI:    Fredrick Mehta is a 82 year old  (1940) male who was seen at [facility] on August 15, 2023 for an initial visit.     Medical history is notable for Alzheimer's dementia, PAF, dyslipidemia, hypothyroidism, CKD, benign positional vertigo, migraine, and glaucoma.    Summary of hospital course:  Patient was hospitalized at [facility] from August 1 through August 7, 2023 after presenting with acute onset of dizziness and nausea. Blood work was significant for creatinine 1.35, lactic acid 1, white count 7.4, and hemoglobin 14.8.  Urine culture showed slightly cloudy urine, large blood, small leukocyte esterase, more than 182 RBCs, and 10 WBCs.  CT head was negative for acute intracranial pathology.  Brain MRI showed no acute infarct or hemorrhage.  MRA head and neck was unremarkable.  EKG showed heart rate of 56 minutes.  PTA diltiazem was therefore discontinued due to bradycardia.    On August 2, he developed sepsis for which RRT was called and he was transferred to [facility].  Repeat lactic acid was elevated at 5.6.  Procalcitonin was 10.94.  He was resuscitated with bolus of lactated ringer and treated empirically with vancomycin and Zosyn.  Chest x-ray showed no acute disease. Echocardiogram was significant for LVEF 60-65%, grade 1 or early diastolic dysfunction, no hemodynamically significant valvular disease, and mild aortic root and ascending aorta dilation.  UA grew less than 10,000 per mL urogenital trse and blood cultures remained negative.  The source of sepsis remained evasive and antibiotic therapy was eventually de-escalated to oral cephalexin.    TCU was recommended per therapies.    Patient is admitted to this facility for medical management,  nursing care, and rehab.     Of note, history was obtained from patient, facility RN, and extensive review of the chart.    Today's visit:  Patient was seen in room, while lying in bed.  He looks comfortable.  He has significant cognitive deficits and is not able to provide any credible information.  There is no report of fever, chills, dizziness, chest pain, palpitation, dyspnea, nausea, vomiting, abdominal pain, or urinary symptoms.      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  Benign positional vertigo  Migraine  Glaucoma    Past Medical History:   Diagnosis Date     Anatomical narrow angle      Benign positional vertigo      Cataracts      Glaucoma     OAG     Hypercholesterolemia      Migraine      Paroxysmal atrial fibrillation (H)        PAST SURGICAL HISTORY:   Past Surgical History:   Procedure Laterality Date     PROSTATE SURGERY       SELECTIVE LASER TRABECULOPLASTY (SLT) OD (RIGHT EYE)  2007    RE       FAMILY HISTORY:   Family History   Problem Relation Age of Onset     Glaucoma Maternal Grandfather      Macular Degeneration No family hx of        SOCIAL HISTORY:  Social History     Tobacco Use     Smoking status: Former     Packs/day: 1.00     Years: 28.00     Pack years: 28.00     Types: Cigarettes     Start date:      Quit date:      Years since quittin.6     Smokeless tobacco: Never   Substance Use Topics     Alcohol use: Yes     Comment: 1 drink of wine per day       MEDICATIONS:  Current Outpatient Medications   Medication Sig Dispense Refill     acetaminophen (TYLENOL) 325 MG tablet Take 1-2 tablets (325-650 mg) by mouth every 6 hours as needed for mild pain (Patient taking differently: Take 650 mg by  "mouth every 6 hours as needed for mild pain)       apixaban ANTICOAGULANT (ELIQUIS) 5 MG tablet Take 1 tablet (5 mg) by mouth 2 times daily 14 tablet 0     Cetirizine HCl (ZYRTEC ALLERGY PO) Take 10 mg by mouth daily as needed       donepezil (ARICEPT) 10 MG tablet Take 1 tablet (10 mg) by mouth At Bedtime 30 tablet 0     latanoprost (XALATAN) 0.005 % ophthalmic solution Place 1 drop into both eyes At Bedtime       levothyroxine (SYNTHROID/LEVOTHROID) 50 MCG tablet Take 1 tablet (50 mcg) by mouth daily 90 tablet 4     memantine (NAMENDA) 10 MG tablet Take 10 mg by mouth At Bedtime       pravastatin (PRAVACHOL) 10 MG tablet Take 1 tablet (10 mg) by mouth daily 90 tablet 3     pravastatin (PRAVACHOL) 20 MG tablet TAKE ONE TABLET BY MOUTH EVERY EVENING. TAKE TOGETHER WITH 10MG 90 tablet 3       MED REC REQUIRED  Post Medication Reconciliation Status: medication reconcilation previously completed during another office visit         ALLERGIES:  Allergies   Allergen Reactions     Hay Fever & [A.R.M.]      Latex      Seasonal Allergies        ROS:  10 point ROS was attempted but was limited, given patient's underlying cognitive impairment. It was reviewed as much as possible as outlined in HPI.    PHYSICAL EXAM:  Vital signs were reviewed in the chart.  Vital Signs: /77   Pulse 60   Temp 97.8  F (36.6  C)   Resp 18   Ht 1.803 m (5' 11\")   Wt 72.6 kg (160 lb)   SpO2 97%   BMI 22.32 kg/m    General: Comfortable and in no acute distress  HEENT: No conjunctival pallor, no scleral icterus or injection, moist oral mucosa  Cardiovascular: Normal S1, S2, regular rhythm, slightly bradycardic  Respiratory: Lungs clear to auscultation bilaterally  GI: Abdomen soft, non-tender, non-distended, +BS  Extremities: No LE edema  Neuro: CX II-XII grossly intact; ROM in all four extremities grossly intact  Psych: Alert and oriented x1; normal affect  Skin: No acute rash    LABORATORY/IMAGING DATA:  All relevant labs and imaging " data in [facility] and/or Care Everywhere were personally reviewed today.      Most Recent 3 CBC's:Recent Labs   Lab Test 08/09/23  0845 08/04/23  0524 08/03/23  0510 08/02/23  1118   WBC 7.5  --  7.1 8.6   HGB 13.7 12.2* 11.4* 13.6   MCV 96  --  93 95     --  129* 170     Most Recent 3 BMP's:Recent Labs   Lab Test 08/09/23  0845 08/07/23  0514 08/06/23  0534 08/05/23  0532 08/04/23  0524 08/03/23  0510     --   --   --  141 139   POTASSIUM 3.8  --   --   --  3.9 3.8   CHLORIDE 105  --   --   --  105 107   CO2 26  --   --   --  26 23   BUN 22.2  --   --   --  12.1 18.4   CR 1.13 1.09 1.27*   < > 1.34* 1.25*   ANIONGAP 11  --   --   --  10 9   SORIN 9.1  --   --   --  8.8 8.2*   GLC 70  --   --   --  86 97    < > = values in this interval not displayed.     Most Recent 2 LFT's:Recent Labs   Lab Test 08/01/23  1320 01/10/23  1649 07/16/22 2036   AST 22  --  40   ALT 13 22 42   ALKPHOS 84  --  73   BILITOTAL 0.5  --  0.5         ASSESSMENT/PLAN:  Severe sepsis of unclear source, resolved.  Treated empirically with antibiotics (IV Zosyn and vancomycin followed by cephalexin); completed on August 8.  Cultures remained negative.  No evidence for localizing infection.  Plan:  Monitor fever curve and vitals  Monitor for any signs or symptoms of infection    Sinus bradycardia,  PAF.  Patient originally presented with dizziness.  PTA diltiazem was discontinued due to bradycardia.  Heart rate now in the range of 55-65.    Plan:  Continue apixaban 5 mg twice daily  Monitor heart rate    Alzheimer's dementia.  On today's examination, patient is oriented only x1.  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  Per echo on August 7, 2023.  Plan:  Follow-up as outpatient    Dyslipidemia.  Plan:  Continue pravastatin  30 mg daily    Hypothyroidism.  Last TSH 1.54 in January 2023.  Plan:  Continue levothyroxine 50 mcg daily    CKD stage II-IIIa.  Baseline creatinine 1.1-1.4.  Last creatinine 1.13 on August 9.  Plan:  Avoid NSAIDs and nephrotoxins  Monitor renal function periodically    Physical deconditioning.  Plan:  Continue PT/OT evaluation and therapy          Orders written by provider at facility:  None.        Disclaimer: This note may contain text created using speech-recognition software and may contain unintended word substitutions.      Electronically signed by:  Samir Alvarez MD

## 2023-08-15 ENCOUNTER — TRANSITIONAL CARE UNIT VISIT (OUTPATIENT)
Dept: GERIATRICS | Facility: CLINIC | Age: 83
End: 2023-08-15
Payer: COMMERCIAL

## 2023-08-15 VITALS
DIASTOLIC BLOOD PRESSURE: 77 MMHG | SYSTOLIC BLOOD PRESSURE: 118 MMHG | HEART RATE: 60 BPM | TEMPERATURE: 97.8 F | HEIGHT: 71 IN | WEIGHT: 160 LBS | OXYGEN SATURATION: 97 % | BODY MASS INDEX: 22.4 KG/M2 | RESPIRATION RATE: 18 BRPM

## 2023-08-15 VITALS
RESPIRATION RATE: 16 BRPM | TEMPERATURE: 98.4 F | OXYGEN SATURATION: 97 % | DIASTOLIC BLOOD PRESSURE: 78 MMHG | BODY MASS INDEX: 22.4 KG/M2 | HEIGHT: 71 IN | WEIGHT: 160 LBS | HEART RATE: 67 BPM | SYSTOLIC BLOOD PRESSURE: 131 MMHG

## 2023-08-15 DIAGNOSIS — I48.0 PAF (PAROXYSMAL ATRIAL FIBRILLATION) (H): ICD-10-CM

## 2023-08-15 DIAGNOSIS — N18.2 STAGE 2 CHRONIC KIDNEY DISEASE: ICD-10-CM

## 2023-08-15 DIAGNOSIS — A41.9 SEPSIS WITHOUT ACUTE ORGAN DYSFUNCTION, DUE TO UNSPECIFIED ORGANISM (H): Primary | ICD-10-CM

## 2023-08-15 DIAGNOSIS — E78.5 DYSLIPIDEMIA: ICD-10-CM

## 2023-08-15 DIAGNOSIS — E03.9 HYPOTHYROIDISM, UNSPECIFIED TYPE: ICD-10-CM

## 2023-08-15 DIAGNOSIS — I51.89 DIASTOLIC DYSFUNCTION: ICD-10-CM

## 2023-08-15 DIAGNOSIS — R53.81 PHYSICAL DECONDITIONING: ICD-10-CM

## 2023-08-15 DIAGNOSIS — G30.9 ALZHEIMER'S DISEASE, UNSPECIFIED (CODE) (H): ICD-10-CM

## 2023-08-15 DIAGNOSIS — R00.1 SINUS BRADYCARDIA: ICD-10-CM

## 2023-08-15 PROCEDURE — 99306 1ST NF CARE HIGH MDM 50: CPT | Performed by: INTERNAL MEDICINE

## 2023-08-15 NOTE — LETTER
8/15/2023        RE: Fredrick Mehta  [address]  [facility] [address]        [place] [facility]  INITIAL VISIT NOTE  August 15, 2023    PRIMARY CARE PROVIDER AND CLINIC:  Pieter Ulloa [address] [place] [address] / [facility] [address]    CHIEF COMPLAINT:  Hospital follow-up/Initial visit    HPI:    Fredrick Mehta is a 82 year old  (1940) male who was seen at [place] on [facility] on August 15, 2023 for an initial visit.     Medical history is notable for Alzheimer's dementia, PAF, dyslipidemia, hypothyroidism, CKD, benign positional vertigo, migraine, and glaucoma.    Summary of hospital course:  Patient was hospitalized at [facility] from August 1 through August 7, 2023 after presenting with acute onset of dizziness and nausea. Blood work was significant for creatinine 1.35, lactic acid 1, white count 7.4, and hemoglobin 14.8.  Urine culture showed slightly cloudy urine, large blood, small leukocyte esterase, more than 182 RBCs, and 10 WBCs.  CT head was negative for acute intracranial pathology.  Brain MRI showed no acute infarct or hemorrhage.  MRA head and neck was unremarkable.  EKG showed heart rate of 56 minutes.  PTA diltiazem was therefore discontinued due to bradycardia.    On August 2, he developed sepsis for which RRT was called and he was transferred to [facility].  Repeat lactic acid was elevated at 5.6.  Procalcitonin was 10.94.  He was resuscitated with bolus of lactated ringer and treated empirically with vancomycin and Zosyn.  Chest x-ray showed no acute disease. Echocardiogram was significant for LVEF 60-65%, grade 1 or early diastolic dysfunction, no hemodynamically significant valvular disease, and mild aortic root and ascending aorta dilation.  UA grew less than 10,000 per mL urogenital tres and blood cultures remained negative.  The source of sepsis remained evasive and antibiotic therapy was eventually de-escalated to oral  cephalexin.    TCU was recommended per therapies.    Patient is admitted to this facility for medical management, nursing care, and rehab.     Of note, history was obtained from patient, facility RN, and extensive review of the chart.    Today's visit:  Patient was seen in room, while lying in bed.  He looks comfortable.  He has significant cognitive deficits and is not able to provide any credible information.  There is no report of fever, chills, dizziness, chest pain, palpitation, dyspnea, nausea, vomiting, abdominal pain, or urinary symptoms.      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  Benign positional vertigo  Migraine  Glaucoma    Past Medical History:   Diagnosis Date     Anatomical narrow angle      Benign positional vertigo      Cataracts      Glaucoma     OAG     Hypercholesterolemia      Migraine      Paroxysmal atrial fibrillation (H)        PAST SURGICAL HISTORY:   Past Surgical History:   Procedure Laterality Date     PROSTATE SURGERY       SELECTIVE LASER TRABECULOPLASTY (SLT) OD (RIGHT EYE)  2007    RE       FAMILY HISTORY:   Family History   Problem Relation Age of Onset     Glaucoma Maternal Grandfather      Macular Degeneration No family hx of        SOCIAL HISTORY:  Social History     Tobacco Use     Smoking status: Former     Packs/day: 1.00     Years: 28.00     Pack years: 28.00     Types: Cigarettes     Start date:      Quit date:      Years since quittin.6     Smokeless tobacco: Never   Substance Use Topics     Alcohol use: Yes     Comment: 1 drink of wine per day       MEDICATIONS:  Current Outpatient Medications   Medication Sig Dispense Refill     acetaminophen (TYLENOL) 325 MG tablet Take 1-2  "tablets (325-650 mg) by mouth every 6 hours as needed for mild pain (Patient taking differently: Take 650 mg by mouth every 6 hours as needed for mild pain)       apixaban ANTICOAGULANT (ELIQUIS) 5 MG tablet Take 1 tablet (5 mg) by mouth 2 times daily 14 tablet 0     Cetirizine HCl (ZYRTEC ALLERGY PO) Take 10 mg by mouth daily as needed       donepezil (ARICEPT) 10 MG tablet Take 1 tablet (10 mg) by mouth At Bedtime 30 tablet 0     latanoprost (XALATAN) 0.005 % ophthalmic solution Place 1 drop into both eyes At Bedtime       levothyroxine (SYNTHROID/LEVOTHROID) 50 MCG tablet Take 1 tablet (50 mcg) by mouth daily 90 tablet 4     memantine (NAMENDA) 10 MG tablet Take 10 mg by mouth At Bedtime       pravastatin (PRAVACHOL) 10 MG tablet Take 1 tablet (10 mg) by mouth daily 90 tablet 3     pravastatin (PRAVACHOL) 20 MG tablet TAKE ONE TABLET BY MOUTH EVERY EVENING. TAKE TOGETHER WITH 10MG 90 tablet 3       MED REC REQUIRED  Post Medication Reconciliation Status: medication reconcilation previously completed during another office visit         ALLERGIES:  Allergies   Allergen Reactions     Hay Fever & [A.R.M.]      Latex      Seasonal Allergies        ROS:  10 point ROS was attempted but was limited, given patient's underlying cognitive impairment. It was reviewed as much as possible as outlined in HPI.    PHYSICAL EXAM:  Vital signs were reviewed in the chart.  Vital Signs: /77   Pulse 60   Temp 97.8  F (36.6  C)   Resp 18   Ht 1.803 m (5' 11\")   Wt 72.6 kg (160 lb)   SpO2 97%   BMI 22.32 kg/m    General: Comfortable and in no acute distress  HEENT: No conjunctival pallor, no scleral icterus or injection, moist oral mucosa  Cardiovascular: Normal S1, S2, regular rhythm, slightly bradycardic  Respiratory: Lungs clear to auscultation bilaterally  GI: Abdomen soft, non-tender, non-distended, +BS  Extremities: No LE edema  Neuro: CX II-XII grossly intact; ROM in all four extremities grossly intact  Psych: Alert " and oriented x1; normal affect  Skin: No acute rash    LABORATORY/IMAGING DATA:  All relevant labs and imaging data in [facility] and/or Care Everywhere were personally reviewed today.      Most Recent 3 CBC's:Recent Labs   Lab Test 08/09/23  0845 08/04/23  0524 08/03/23  0510 08/02/23  1118   WBC 7.5  --  7.1 8.6   HGB 13.7 12.2* 11.4* 13.6   MCV 96  --  93 95     --  129* 170     Most Recent 3 BMP's:Recent Labs   Lab Test 08/09/23  0845 08/07/23  0514 08/06/23  0534 08/05/23  0532 08/04/23  0524 08/03/23  0510     --   --   --  141 139   POTASSIUM 3.8  --   --   --  3.9 3.8   CHLORIDE 105  --   --   --  105 107   CO2 26  --   --   --  26 23   BUN 22.2  --   --   --  12.1 18.4   CR 1.13 1.09 1.27*   < > 1.34* 1.25*   ANIONGAP 11  --   --   --  10 9   SORIN 9.1  --   --   --  8.8 8.2*   GLC 70  --   --   --  86 97    < > = values in this interval not displayed.     Most Recent 2 LFT's:Recent Labs   Lab Test 08/01/23  1320 01/10/23  1649 07/16/22  2036   AST 22  --  40   ALT 13 22 42   ALKPHOS 84  --  73   BILITOTAL 0.5  --  0.5         ASSESSMENT/PLAN:  Severe sepsis of unclear source, resolved.  Treated empirically with antibiotics (IV Zosyn and vancomycin followed by cephalexin); completed on August 8.  Cultures remained negative.  No evidence for localizing infection.  Plan:  Monitor fever curve and vitals  Monitor for any signs or symptoms of infection    Sinus bradycardia,  PAF.  Patient originally presented with dizziness.  PTA diltiazem was discontinued due to bradycardia.  Heart rate now in the range of 55-65.    Plan:  Continue apixaban 5 mg twice daily  Monitor heart rate    Alzheimer's dementia.  On today's examination, patient is oriented only x1.  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  Per echo on August 7, 2023.  Plan:  Follow-up as outpatient    Dyslipidemia.  Plan:  Continue pravastatin 30 mg daily    Hypothyroidism.  Last TSH 1.54 in January 2023.  Plan:  Continue levothyroxine 50 mcg daily    CKD stage II-IIIa.  Baseline creatinine 1.1-1.4.  Last creatinine 1.13 on August 9.  Plan:  Avoid NSAIDs and nephrotoxins  Monitor renal function periodically    Physical deconditioning.  Plan:  Continue PT/OT evaluation and therapy          Orders written by provider at facility:  None.        Disclaimer: This note may contain text created using speech-recognition software and may contain unintended word substitutions.      Electronically signed by:  Samir Alvarez MD                          Sincerely,        Samir Alvarez MD

## 2023-08-16 ENCOUNTER — TRANSITIONAL CARE UNIT VISIT (OUTPATIENT)
Dept: GERIATRICS | Facility: CLINIC | Age: 83
End: 2023-08-16
Payer: COMMERCIAL

## 2023-08-16 DIAGNOSIS — E03.9 HYPOTHYROIDISM, UNSPECIFIED TYPE: ICD-10-CM

## 2023-08-16 DIAGNOSIS — R00.1 SINUS BRADYCARDIA: ICD-10-CM

## 2023-08-16 DIAGNOSIS — A41.9 SEPSIS WITHOUT ACUTE ORGAN DYSFUNCTION, DUE TO UNSPECIFIED ORGANISM (H): Primary | ICD-10-CM

## 2023-08-16 DIAGNOSIS — E78.5 DYSLIPIDEMIA: ICD-10-CM

## 2023-08-16 DIAGNOSIS — G30.9 ALZHEIMER'S DISEASE, UNSPECIFIED (CODE) (H): ICD-10-CM

## 2023-08-16 DIAGNOSIS — N18.2 STAGE 2 CHRONIC KIDNEY DISEASE: ICD-10-CM

## 2023-08-16 DIAGNOSIS — R53.81 PHYSICAL DECONDITIONING: ICD-10-CM

## 2023-08-16 DIAGNOSIS — I48.0 PAF (PAROXYSMAL ATRIAL FIBRILLATION) (H): ICD-10-CM

## 2023-08-16 PROCEDURE — 99309 SBSQ NF CARE MODERATE MDM 30: CPT | Performed by: NURSE PRACTITIONER

## 2023-08-16 NOTE — PROGRESS NOTES
"[facility] GERIATRICS    Chief Complaint   Patient presents with    RECHECK     HPI:  Fredrick Mehta is a 82 year old  (1940), who is being seen today for an episodic care visit at: [facility] (TCU) [[id number]].     Per recent TCU provider progress notes:  82 year old male PMH Alzheimer's dementia, HTN, PAF hospitalized with dizziness. Developed sepsis unknown etiology. Work up negative, treated with fluids, zosyn and vanco, procalcitonin 10.94 down to 6.66.  BC NGTD. Dizziness: imaging negative, resolved. PAF and sinus bradycardia: on Eliquis, Cardizem stopped. Hypothyroid on synthroid. Dementia on Namenda and Aricept. HLD on statin. CKD3 with cr 1.3-1.5 at baseline, stable. To TCU For rehab.     Today's concern is: seen for episodic f/u vs, labs, mobility. Reports doing well. No headaches, dizziness, chest pain, dyspnea, bowel or bladder issues. Walks 300 ft with FWW. SLUMS 18/30.l BP range 105-137/68-78 and sats 97% room air.     Allergies, and PMH/PSH reviewed in EPIC today.  REVIEW OF SYSTEMS:  Limited secondary to cognitive impairment but today pt reports no new concerns.     Objective:   /78   Pulse 67   Temp 98.4  F (36.9  C)   Resp 16   Ht 1.803 m (5' 11\")   Wt 72.6 kg (160 lb)   SpO2 97%   BMI 22.32 kg/m    GENERAL APPEARANCE:  Alert, in no distress, cooperative  ENT:  Mouth normal, moist mucous membranes, normal hearing acuity  EYES:  Conjunctiva and lids normal  RESP:  no respiratory distress, on room air and LSC  CV: no LE edema. HRR.  NEURO:   No facial asymmetry, speech clear  PSYCH:  oriented X 3, affect and mood normal     Most Recent 3 CBC's:  Recent Labs   Lab Test 08/09/23  0845 08/04/23  0524 08/03/23  0510 08/02/23  1118   WBC 7.5  --  7.1 8.6   HGB 13.7 12.2* 11.4* 13.6   MCV 96  --  93 95     --  129* 170     Most Recent 3 BMP's:  Recent Labs   Lab Test 08/09/23  0845 08/07/23  0514 08/06/23  0534 08/05/23  0532 08/04/23  0524 08/03/23  0510    142  --   --  "  --  141 139   POTASSIUM 3.8  --   --   --  3.9 3.8   CHLORIDE 105  --   --   --  105 107   CO2 26  --   --   --  26 23   BUN 22.2  --   --   --  12.1 18.4   CR 1.13 1.09 1.27*   < > 1.34* 1.25*   ANIONGAP 11  --   --   --  10 9   SORIN 9.1  --   --   --  8.8 8.2*   GLC 70  --   --   --  86 97    < > = values in this interval not displayed.       Assessment/Plan:  Severe sepsis of unclear source, resolved  Completed antibiotics. Monitor for s/s infection.     Sinus bradycardia  PAF  Chronic, rate controlled. Continue apixaban 5 mg twice daily. Monitor vs.     Alzheimer's dementia  Chronic. Continue donepezil 10 mg at bedtime and memantine 10 mg at bedtime. Monitor for safety.     Dyslipidemia  Continue pravastatin 30 mg daily    Hypothyroidism  Continue levothyroxine 50 mcg daily    CKD stage II-IIIa  Baseline creatinine 1.1-1.4. Cr stable. Avoid NSAIDs and nephrotoxins. Monitor renal function PRN.     Physical deconditioning  Acute. Continue PT/OT evaluation and therapy and f/u progress.     MED REC REQUIRED  Post Medication Reconciliation Status:  Discharge medications reconciled, continue medications without change    Orders:  No new orders    Electronically signed by: SAMUEL Cortez CNP

## 2023-08-21 ENCOUNTER — DISCHARGE SUMMARY NURSING HOME (OUTPATIENT)
Dept: GERIATRICS | Facility: CLINIC | Age: 83
End: 2023-08-21
Payer: COMMERCIAL

## 2023-08-21 VITALS
HEART RATE: 57 BPM | HEIGHT: 71 IN | TEMPERATURE: 98.2 F | BODY MASS INDEX: 22.76 KG/M2 | SYSTOLIC BLOOD PRESSURE: 127 MMHG | OXYGEN SATURATION: 97 % | RESPIRATION RATE: 16 BRPM | DIASTOLIC BLOOD PRESSURE: 75 MMHG | WEIGHT: 162.6 LBS

## 2023-08-21 DIAGNOSIS — G30.9 ALZHEIMER'S DISEASE, UNSPECIFIED (CODE) (H): ICD-10-CM

## 2023-08-21 DIAGNOSIS — E03.9 HYPOTHYROIDISM, UNSPECIFIED TYPE: ICD-10-CM

## 2023-08-21 DIAGNOSIS — A41.9 SEPSIS WITHOUT ACUTE ORGAN DYSFUNCTION, DUE TO UNSPECIFIED ORGANISM (H): Primary | ICD-10-CM

## 2023-08-21 DIAGNOSIS — R00.1 SINUS BRADYCARDIA: ICD-10-CM

## 2023-08-21 DIAGNOSIS — R53.81 PHYSICAL DECONDITIONING: ICD-10-CM

## 2023-08-21 DIAGNOSIS — E78.5 DYSLIPIDEMIA: ICD-10-CM

## 2023-08-21 DIAGNOSIS — I48.0 PAF (PAROXYSMAL ATRIAL FIBRILLATION) (H): ICD-10-CM

## 2023-08-21 DIAGNOSIS — N18.2 STAGE 2 CHRONIC KIDNEY DISEASE: ICD-10-CM

## 2023-08-21 PROCEDURE — 99316 NF DSCHRG MGMT 30 MIN+: CPT | Performed by: NURSE PRACTITIONER

## 2023-08-21 NOTE — PROGRESS NOTES
[facility] GERIATRICS DISCHARGE SUMMARY  PATIENT'S NAME: Fredrick Mehta  YOB: 1940  MEDICAL RECORD NUMBER:  [id number]  Place of Service where encounter took place:  [facility] (TCU) [[id number]]    PRIMARY CARE PROVIDER AND CLINIC RESPONSIBLE AFTER TRANSFER:   Pieter Ulloa MD, MD, [address] [place] [address] / [facility] [id number]    [facility] Provider     Transferring providers: SAMUEL Cortez CNP, Dr. Antonio MD  Recent Hospitalization/ED:  [facility] stay 8/1/23 to 8/7/23.  Date of SNF Admission:  8/7/23  Date of SNF (anticipated) Discharge:  8/22/23  Discharged to: previous independent home  Cognitive Scores: SLUMS: 18/30  Physical Function: Ambulating 600 ft with FWW  DME: No new DME needed    CODE STATUS/ADVANCE DIRECTIVES DISCUSSION:  Full Code   ALLERGIES: Hay fever & [a.r.m.], Latex, and Seasonal allergies    NURSING FACILITY COURSE   Medication Changes/Rationale:   None    Per recent TCU provider progress notes:  82 year old male PMH Alzheimer's dementia, HTN, PAF hospitalized with dizziness. Developed sepsis unknown etiology. Work up negative, treated with fluids, zosyn and vanco, procalcitonin 10.94 down to 6.66.  BC NGTD. Dizziness: imaging negative, resolved. PAF and sinus bradycardia: on Eliquis, Cardizem stopped. Hypothyroid on synthroid. Dementia on Namenda and Aricept. HLD on statin. CKD3 with cr 1.3-1.5 at baseline, stable. To TCU For rehab.     Home with home care. Seen for discharge- no new concerns. No s/s infection. BP range 106-130/65-75 and sats 97% room air.     Summary of nursing facility stay:   Severe sepsis of unclear source, resolved  Completed antibiotics. Monitor for s/s infection.     Sinus bradycardia  PAF  Chronic, rate controlled. Continue apixaban 5 mg twice daily. Monitor vs.     Alzheimer's dementia  Chronic. Continue donepezil 10 mg at bedtime and memantine 10 mg at bedtime. Monitor for safety.  "    Dyslipidemia  Continue pravastatin 30 mg daily    Hypothyroidism  Continue levothyroxine 50 mcg daily    CKD stage II-IIIa  Baseline creatinine 1.1-1.4. Cr stable. Avoid NSAIDs and nephrotoxins. Monitor renal function PRN.     Physical deconditioning  Improved. Home with home care.     Discharge Medications:  MED REC REQUIRED  Post Medication Reconciliation Status:  Discharge medications reconciled, continue medications without change       Current Outpatient Medications   Medication Sig Dispense Refill    acetaminophen (TYLENOL) 325 MG tablet Take 1-2 tablets (325-650 mg) by mouth every 6 hours as needed for mild pain (Patient taking differently: Take 650 mg by mouth every 6 hours as needed for mild pain)      apixaban ANTICOAGULANT (ELIQUIS) 5 MG tablet Take 1 tablet (5 mg) by mouth 2 times daily 14 tablet 0    Cetirizine HCl (ZYRTEC ALLERGY PO) Take 10 mg by mouth daily as needed      donepezil (ARICEPT) 10 MG tablet Take 1 tablet (10 mg) by mouth At Bedtime 30 tablet 0    latanoprost (XALATAN) 0.005 % ophthalmic solution Place 1 drop into both eyes At Bedtime      levothyroxine (SYNTHROID/LEVOTHROID) 50 MCG tablet Take 1 tablet (50 mcg) by mouth daily 90 tablet 4    memantine (NAMENDA) 10 MG tablet Take 10 mg by mouth At Bedtime      pravastatin (PRAVACHOL) 10 MG tablet Take 1 tablet (10 mg) by mouth daily 90 tablet 3    pravastatin (PRAVACHOL) 20 MG tablet TAKE ONE TABLET BY MOUTH EVERY EVENING. TAKE TOGETHER WITH 10MG 90 tablet 3        Controlled medications:   not applicable/none     Past Medical History:   Past Medical History:   Diagnosis Date    Anatomical narrow angle     Benign positional vertigo     Cataracts     Glaucoma     OAG    Hypercholesterolemia     Migraine     Paroxysmal atrial fibrillation (H)      Physical Exam:   Vitals: /75   Pulse 57   Temp 98.2  F (36.8  C)   Resp 16   Ht 1.803 m (5' 11\")   Wt 73.8 kg (162 lb 9.6 oz)   SpO2 97%   BMI 22.68 kg/m    BMI: Body mass index " is 22.68 kg/m .  GENERAL APPEARANCE:  Alert, in no distress, cooperative  ENT:  Mouth normal, moist mucous membranes, normal hearing acuity  EYES:  Conjunctiva and lids normal  RESP:  no respiratory distress, on room air and LSC  CV: no LE edema. HRR.  NEURO:   No facial asymmetry, speech clear  PSYCH:  oriented X 3, affect and mood normal     SNF labs: Most Recent 3 CBC's:  Recent Labs   Lab Test 08/09/23  0845 08/04/23  0524 08/03/23  0510 08/02/23  1118   WBC 7.5  --  7.1 8.6   HGB 13.7 12.2* 11.4* 13.6   MCV 96  --  93 95     --  129* 170     Most Recent 3 BMP's:  Recent Labs   Lab Test 08/09/23  0845 08/07/23  0514 08/06/23  0534 08/05/23  0532 08/04/23  0524 08/03/23  0510     --   --   --  141 139   POTASSIUM 3.8  --   --   --  3.9 3.8   CHLORIDE 105  --   --   --  105 107   CO2 26  --   --   --  26 23   BUN 22.2  --   --   --  12.1 18.4   CR 1.13 1.09 1.27*   < > 1.34* 1.25*   ANIONGAP 11  --   --   --  10 9   SORIN 9.1  --   --   --  8.8 8.2*   GLC 70  --   --   --  86 97    < > = values in this interval not displayed.       DISCHARGE PLAN:  Follow up labs: No labs orders/due  Medical Follow Up:      Follow up with primary care provider in 2-3 weeks  Current [place] scheduled appointments:     Future Appointments   Date Time Provider Department Center   8/31/2023  2:00 PM Jonathan Prado MD [facility]   9/19/2023  3:00 PM Pieter Ulloa MD [facility]      Discharge Services: Home Care:  Occupational Therapy, Physical Therapy, Registered Nurse, Home Health Aide, and From:  [facility]  Discharge Instructions Verbalized to Patient at Discharge:   None    TOTAL DISCHARGE TIME:   Greater than 30 minutes  Electronically signed by:  SAMUEL Cortez CNP     Home care Face to Face documentation done in [facility] attached to Home care orders for [facility].

## 2023-08-24 ENCOUNTER — TELEPHONE (OUTPATIENT)
Dept: FAMILY MEDICINE | Facility: CLINIC | Age: 83
End: 2023-08-24

## 2023-08-24 NOTE — TELEPHONE ENCOUNTER
Called Neri, with [facility], at [phone], ext [phone], and provided verbal okay to do a HHA eval & treat.  XIN ChamberlainN, RN, [facility]  RN Care Coordinator  [facility]  08/24/23  2:53 PM  Phone: [phone]

## 2023-08-24 NOTE — TELEPHONE ENCOUNTER
Home Care Verbal Orders    Caller Name: Neri MoodyLilibethDengian   Agency: [employer]   Phone: [phone] ext [phone]  Ok to leave vm    Orders Requested:    Home Health Aid (HHA) home care orders for initial eval      Thank you,    Carin / [facility] /

## 2023-08-25 ENCOUNTER — TELEPHONE (OUTPATIENT)
Dept: FAMILY MEDICINE | Facility: CLINIC | Age: 83
End: 2023-08-25

## 2023-08-25 NOTE — TELEPHONE ENCOUNTER
"Pt wife Rosalva calling regarding Fredrick's mood which is markedly depressed. She reports that he is \"sitting around all day\" stating he \"hates being old\", frequently with his head in his hands. Pt was hospitalized for sepsis on 8/1 and transferred to TCU one week later. He was discharged home two days ago from TCU ([place] [facility]). He was initially glad to be home but his mood has decompensated over the last 48 hours. Rosalva is asking if he would benefit from antidepressant medication. Explained to Rosalva that a mental health evaluation would be required prior to starting any medications. He has upcoming appointment with Dr. Ulloa on 9/19, but given acute presentation will also schedule telephone visit for mental health evaluation with Dr. Prado on 8/31 to discuss symptoms and concerns. Note that pt does have existing Alzheimer's dementia diagnosis and explained to Rosalva that it is common for mood changes to occur in this population particularly when there are big changes to their routine (such as a hospitalization and transfers to several different living arrangements in a short period of time). Discussed safety, and advised Rosalva to call 911 if she feels that Fredrick presents a risk to his own safety or to her. She confirms understanding.    Arnold HOGUE, RN  08/25/23 11:07 AM  "

## 2023-08-27 ENCOUNTER — MEDICAL CORRESPONDENCE (OUTPATIENT)
Dept: HEALTH INFORMATION MANAGEMENT | Facility: CLINIC | Age: 83
End: 2023-08-27

## 2023-08-28 ENCOUNTER — TELEPHONE (OUTPATIENT)
Dept: FAMILY MEDICINE | Facility: CLINIC | Age: 83
End: 2023-08-28

## 2023-08-28 NOTE — TELEPHONE ENCOUNTER
Home Care Verbal Orders    Caller Name: Shweta   [facility]  Ph: [phone], ok to leave a .     Orders Requested:    Home Health Aid (HHA) one time a week and every other week for  eight weeks  PT and OT eval and treat    [facility] / [facility] / [facility]

## 2023-08-28 NOTE — TELEPHONE ENCOUNTER
Called Shweta from [employer] at [phone] at provided verbal approval for:    Home Health Aid (HHA) once every other week for 8 weeks  & PT and OT eval and treat.    XIN ChamberlainN, RN, CCM  RN Care Coordinator  [facility]  08/28/23  10:33 AM  Phone: [phone]

## 2023-08-31 ENCOUNTER — TELEPHONE (OUTPATIENT)
Dept: FAMILY MEDICINE | Facility: CLINIC | Age: 83
End: 2023-08-31

## 2023-08-31 NOTE — TELEPHONE ENCOUNTER
Home Care Verbal Orders    Caller Name:Elza   Agency: [facility]   Ph: [phone], ok to leave a      Orders Requested:   Physical Therapy (PT) six visits over the next two months.

## 2023-09-11 ENCOUNTER — TELEPHONE (OUTPATIENT)
Dept: FAMILY MEDICINE | Facility: CLINIC | Age: 83
End: 2023-09-11

## 2023-09-11 NOTE — TELEPHONE ENCOUNTER
Home Care Verbal Orders    Caller Name:Yoli  Agency: [facility]    Orders Requested:   Occupational Therapy (OT) 1 additional visit needed for upper strengthening       Brianne

## 2023-09-12 NOTE — TELEPHONE ENCOUNTER
Called VERONICA Kent,  [phone] with [facility] to authorize one additional OT visit for upper body strengthening.      XIN ChamberlainN, RN, CCM  RN Care Coordinator  [facility]  09/12/23  10:18 AM  Phone: [phone]

## 2023-09-19 ENCOUNTER — OFFICE VISIT (OUTPATIENT)
Dept: FAMILY MEDICINE | Facility: CLINIC | Age: 83
End: 2023-09-19
Payer: COMMERCIAL

## 2023-09-19 VITALS
SYSTOLIC BLOOD PRESSURE: 114 MMHG | OXYGEN SATURATION: 98 % | DIASTOLIC BLOOD PRESSURE: 72 MMHG | HEART RATE: 58 BPM | TEMPERATURE: 97.5 F

## 2023-09-19 DIAGNOSIS — Z86.19 HISTORY OF SEPSIS: ICD-10-CM

## 2023-09-19 DIAGNOSIS — G30.9 ALZHEIMER'S DISEASE, UNSPECIFIED (CODE) (H): ICD-10-CM

## 2023-09-19 DIAGNOSIS — E55.9 VITAMIN D DEFICIENCY: ICD-10-CM

## 2023-09-19 DIAGNOSIS — Z09 HOSPITAL DISCHARGE FOLLOW-UP: Primary | ICD-10-CM

## 2023-09-19 LAB
BASO+EOS+MONOS # BLD AUTO: 0.5 10E3/UL (ref 0–2.2)
BASO+EOS+MONOS NFR BLD AUTO: 10 %
ERYTHROCYTE [DISTWIDTH] IN BLOOD BY AUTOMATED COUNT: 12.9 % (ref 10–15)
HCT VFR BLD AUTO: 43.1 % (ref 40–53)
HGB BLD-MCNC: 14 G/DL (ref 13.3–17.7)
LYMPHOCYTES # BLD AUTO: 1.4 10E3/UL (ref 0.8–5.3)
LYMPHOCYTES NFR BLD AUTO: 26 %
MCH RBC QN AUTO: 30.2 PG (ref 26.5–33)
MCHC RBC AUTO-ENTMCNC: 32.5 G/DL (ref 31.5–36.5)
MCV RBC AUTO: 93 FL (ref 78–100)
NEUTROPHILS # BLD AUTO: 3.6 10E3/UL (ref 1.6–8.3)
NEUTROPHILS NFR BLD AUTO: 64 %
PLATELET # BLD AUTO: 200 10E3/UL (ref 150–450)
RBC # BLD AUTO: 4.63 10E6/UL (ref 4.4–5.9)
WBC # BLD AUTO: 5.5 10E3/UL (ref 4–11)

## 2023-09-19 PROCEDURE — 84145 PROCALCITONIN (PCT): CPT | Mod: ORL | Performed by: FAMILY MEDICINE

## 2023-09-19 PROCEDURE — 82306 VITAMIN D 25 HYDROXY: CPT | Mod: ORL | Performed by: FAMILY MEDICINE

## 2023-09-19 NOTE — PROGRESS NOTES
CHIEF COMPLAINT: Hospital Follow Up - Sepsis and Dizziness      HISTORY OF PRESENT ILLNESS:  Fredrick Mehta is a 82 year old male, accompanied by his wife Rosalva, with a history of Alzheimer's dementia who presents for the above.    Fredrick has significant Alzheimer's dementia and is taking donepezil 10 mg nightly and memantine 10 mg daily. Rosalva also notes that Fredrick has an ongoing right hand tremor and is requesting a referral for Fredrick to the [facility].      Fredrick had some abnormal lab results at his annual wellness visit in 1/2023. His Vitamin D level was low at 14. I recommended he start a Vitamin D supplement 1000 IU daily and recheck in about 3 months. Rosalva reports that Fredrick has been taking Vitamin D. She notes that Fredrick was quite depressed when he first got home from the hospital and TCU, but is doing better now. He is not yet back to baseline. Will recheck his Vitamin D level today.     Fredrick presented to the hospital on 8/1/2023 for dizziness. He was found to have sepsis, possibly due to urinary infection, and was admitted from 8/1-8/7, then discharged to a TCU for 2.5 weeks. Fredrick notes he does not recall any of this. He received a bolus 2 L lactated Ringer, followed by MIVF LR at 100cc/h. He was also started empirically on Zosyn and Vanco and [facility]. His repeat LA was down to 1.8. He was clinically improved, afebrile. His WBCs were 7.1, procalcitonin down to 6.66. The Vanco was stopped on 8/3. He received  Zosyn for 4 days, then was changed to Keflex, plan to finish 7 days course of ABX. His dizziness resolved while in the hospital. He had a negative head CT, brain MRI, and brain and neck MRA. Echo showed EF 60-65%, no WMA, no hemodynamically significant valvular disease, mild aortic root dilatation. Of note, his Cardizem was held on admission due to the dizziness and it was not recommended to restart this on discharge.     Rosalva reports that Fredrick has not had  dizziness between his hospital visit and today, but this morning complained of some dizziness. She is concerned because this is the same initial symptom that led to Fredrick's hospital admission for sepsis. However, she does note that Fredrick has a history of dizziness. In clinic, Fredrick states that he feels great and is in no pain. No dizziness while in clinic.     Rosalva notes that Fredrick has had new digestive problems with spending more time in the bathroom and hiccupping more often. Rosalva is not completely sure what is going on in the bathroom and Fredrick does not recall.     Fredrick recently had his ears professionally cleaned in preparation for hearing aids, though this process has now been interrupted.     MEDICATIONS:   Carefully Reviewed      REVIEW OF SYSTEMS:  10-point review of systems negative unless otherwise stated in the HPI.       OBJECTIVE:    EXAM:  GENERAL: He is in no distress.  Affect is upbeat.   Alert and pleasantly confused (He kept repeating throughout the entire visit that he didn't remember details of recent events)  /72 (BP Location: Left arm, Patient Position: Sitting, Cuff Size: Adult Regular)   Pulse 58   Temp 97.5  F (36.4  C) (Skin)   SpO2 98%   HEENT:  Head is normocephalic, atraumatic.   BACK:  Smooth and straight.  No pain to percussion.  No CVA tenderness.  LUNGS:  Clear to auscultation bilaterally.   HEART:  Regular rate and rhythm without murmur.   ABDOMEN:  Normal bowel sounds heard throughout. Nontender to palpation.   EXTREMITIES:  Ankles free of any edema.   SKIN:  Warm and dry.       LABORATORY DATA: Labs, pending.       ASSESSMENT AND PLAN:   History of sepsis: Fredrick does NOT appear septic at this time.  However, will recheck procalcitonin, CBC with platelets differential, Routine UA with micro reflex to culture today.   Vitamin D deficiency: Recheck Vitamin D level today.   Alzheimer's dementia: Referred to the [facility] due to neurologist moving.      Total time spent with the patient in history gathering, chart review, examination, counseling, and placing orders was 30 minutes.        I, Ana Rosa Claudio, am serving as a scribe to document services personally performed by Dr. Pieter Ulloa, based on data collection and the provider's statements to me. Dr. Ulloa has reviewed, edited, and approved the above note.     I, Dr. Pieter Ulloa, have interviewed and examined this patient, and I have thoroughly reviewed and edited this note and agree with its contents.

## 2023-09-19 NOTE — NURSING NOTE
82 year old    Chief Complaint   Patient presents with    Referral     Neurology    Hospital F/U     Originally admitted because he felt dizzy and discovered it was sepsis. Woke up today dizzy but is better right now. Has had home health nurses stop by throughout the week since discharge            Blood pressure 114/72, pulse 58, temperature 97.5  F (36.4  C), temperature source Skin, SpO2 98 %. There is no height or weight on file to calculate BMI.    Patient Active Problem List   Diagnosis    Atrial fibrillation (H)    Hypercholesterolemia    Hypothyroidism    Glaucoma    Long-term (current) use of anticoagulants [Z79.01]    Chronic atrial fibrillation (H)    Acquired hypothyroidism    Memory loss    Primary open angle glaucoma of both eyes, moderate stage    Medicare annual wellness visit, subsequent    Other urinary incontinence    Dizziness    Alzheimer's disease, unspecified (CODE) (H)              Wt Readings from Last 2 Encounters:   23 73.8 kg (162 lb 9.6 oz)   08/15/23 72.6 kg (160 lb)       BP Readings from Last 3 Encounters:   23 114/72   23 127/75   08/15/23 131/78                Current Outpatient Medications   Medication    acetaminophen (TYLENOL) 325 MG tablet    apixaban ANTICOAGULANT (ELIQUIS) 5 MG tablet    Cetirizine HCl (ZYRTEC ALLERGY PO)    donepezil (ARICEPT) 10 MG tablet    latanoprost (XALATAN) 0.005 % ophthalmic solution    levothyroxine (SYNTHROID/LEVOTHROID) 50 MCG tablet    memantine (NAMENDA) 10 MG tablet    pravastatin (PRAVACHOL) 10 MG tablet    pravastatin (PRAVACHOL) 20 MG tablet     No current facility-administered medications for this visit.              Social History     Tobacco Use    Smoking status: Former     Packs/day: 1.00     Years: 28.00     Pack years: 28.00     Types: Cigarettes     Start date:      Quit date:      Years since quittin.7    Smokeless tobacco: Never   Substance Use Topics    Alcohol use: Yes     Comment: 1 drink of wine  per day    Drug use: No              Health Maintenance Due   Topic Date Due    COVID-19 Vaccine (6 - Pfizer series) 03/01/2023    INFLUENZA VACCINE (1) 09/01/2023            No results found for: PAP           September 19, 2023 3:12 PM

## 2023-09-19 NOTE — LETTER
September 25, 2023      Fredrick Mehta  [address]  [facility] [address]        Dear Fredrick and Rosalva,     It was great to see you last week!     As you already know, there was no sign of sepsis returning, as your procalcitonin level was almost zero (and much lower than it was last month) and your complete blood count was completely normal.     Also, your vitamin D level is solidly back to normal.     Take care!     Pieter Hamilton   Procalcitonin   Result Value Ref Range    Procalcitonin 0.04 <0.05 ng/mL      Comment:      Interpretation and Recommendations   <0.05 ng/mL Normal   Very low risk of bacterial infection.   Strongly discourage antibiotics.     0.05-0.24 ng/mL Low risk of systemic infection. Local bacterial infection possible.   Assess other clinical features of infection.   Discourage antibiotics.     0.25-0.49 ng/mL Possible early systemic infection or localized infection   Encourage antibiotics only in correct clinical context.   Consider obtaining blood cultures or other relevant cultures.   Recheck PCT in 6-12 hours to ensure baseline low level.   If repeat PCT is rising, consider early systemic infection and consider starting antibiotics.     0.50-1.99 ng/mL Moderate risk of systemic infection.   Recommend antibiotics.   Evaluate culture results and clinical features to target antibacterial therapy.   Obtain blood cultures and other relevant cultures if not done.     If empiric antibiotics were started, recheck PCT in:        2 days to guide antibiotic de-escalation.        Discontinue  or de-escalate antibiotics when PCT concentration is <80% of peak or abs PCT <0.5.     If empiric antibiotics were NOT started, recheck PCT in:        6-24 hours to re-evaluate need for antibiotics.       2.00-9.99 ng/mL High risk for progression to severe sepsis.   Strongly recommend initiating or continuing antibiotics.   Evaluate culture results and clinical features to target  antibacterial therapy.   Obtain blood cultures and other relevant cultures if not done.   Repeat PCT in 2 days to guide antibiotic de-escalation.   Consider de-escalating antibiotics when PCT concentration is <80% of peak or abs PCT < 0.5.     Greater than or equal to 10 ng/mL Very high likelihood of severe sepsis or septic shock.   Strongly recommend initiating or continuing antibiotics.   Evaluate culture results and clinical features to target antibacterial therapy.   Obtain blood cultures and other relevant cultures if not done.   Repeat PCT in 2 days to guide antibiotic de-escalation.   Consider  de-escalating antibiotics when PCT concentration is <80% of peak or abs PCT < 1.0.     Vitamin D Deficiency   Result Value Ref Range    Vitamin D, Total (25-Hydroxy) 34 20 - 75 ug/L    Narrative    Season, race, dietary intake, and treatment affect the concentration of 25-hydroxy-Vitamin D. Values may decrease during winter months and increase during summer months. Values 20-29 ug/L may indicate Vitamin D insufficiency and values <20 ug/L may indicate Vitamin D deficiency.    Vitamin D determination is routinely performed by an immunoassay specific for 25 hydroxyvitamin D3.  If an individual is on vitamin D2(ergocalciferol) supplementation, please specify 25 OH vitamin D2 and D3 level determination by LCMSMS test VITD23.     CBC with platelets and differential   Result Value Ref Range    WBC Count 5.5 4.0 - 11.0 10e3/uL    RBC Count 4.63 4.40 - 5.90 10e6/uL    Hemoglobin 14.0 13.3 - 17.7 g/dL    Hematocrit 43.1 40.0 - 53.0 %    MCV 93 78 - 100 fL    MCH 30.2 26.5 - 33.0 pg    MCHC 32.5 31.5 - 36.5 g/dL    RDW 12.9 10.0 - 15.0 %    Platelet Count 200 150 - 450 10e3/uL    % Neutrophils 64 %    % Lymphocytes 26 %    Mids % (Monos, Eos, Basos) 10 %    Absolute Neutrophils 3.6 1.6 - 8.3 10e3/uL    Absolute Lymphocytes 1.4 0.8 - 5.3 10e3/uL    Mids Abs (Monos, Eos, Basos) 0.5 0.0 - 2.2 10e3/uL       If you have any questions  or concerns, please call the clinic at the number listed above.       Sincerely,      Pieter Ulloa MD

## 2023-09-20 LAB — PROCALCITONIN SERPL IA-MCNC: 0.04 NG/ML

## 2023-09-22 LAB — DEPRECATED CALCIDIOL+CALCIFEROL SERPL-MC: 34 UG/L (ref 20–75)

## 2024-03-15 DIAGNOSIS — E78.2 MIXED HYPERLIPIDEMIA: ICD-10-CM

## 2024-03-15 DIAGNOSIS — E78.00 HYPERCHOLESTEROLEMIA: ICD-10-CM

## 2024-03-15 RX ORDER — PRAVASTATIN SODIUM 10 MG
10 TABLET ORAL DAILY
Qty: 90 TABLET | Refills: 0 | Status: SHIPPED | OUTPATIENT
Start: 2024-03-15 | End: 2024-05-28

## 2024-03-15 NOTE — TELEPHONE ENCOUNTER
Pravastatin (Pravachol) 10 mg    Last Office Visit: 9/19/23  Future [facility] Appointments: None  Medication last refilled: 1/18/23 #90 with 3 refill(s)    Required labs per protocol:    LAB REF RANGE 10/19/21 1/10/23   LDL < 100 mg/dL 87 116 High     Medication is being filled for 1 time refill only due to:  Patient needs labs Lipid profile. Patient needs to be seen because it has been more than one year since last visit.    [employer] message sent to scheduling to call patient and schedule appointment.    Corry Urena, XINN, RN, CCM

## 2024-04-09 ENCOUNTER — OFFICE VISIT (OUTPATIENT)
Dept: FAMILY MEDICINE | Facility: CLINIC | Age: 84
End: 2024-04-09
Payer: COMMERCIAL

## 2024-04-09 VITALS
OXYGEN SATURATION: 96 % | DIASTOLIC BLOOD PRESSURE: 66 MMHG | TEMPERATURE: 98 F | BODY MASS INDEX: 22.32 KG/M2 | SYSTOLIC BLOOD PRESSURE: 100 MMHG | HEART RATE: 75 BPM | WEIGHT: 160 LBS

## 2024-04-09 DIAGNOSIS — Z00.00 MEDICARE ANNUAL WELLNESS VISIT, SUBSEQUENT: Primary | ICD-10-CM

## 2024-04-09 DIAGNOSIS — E03.9 HYPOTHYROIDISM, UNSPECIFIED TYPE: ICD-10-CM

## 2024-04-09 DIAGNOSIS — R63.1 POLYDIPSIA: ICD-10-CM

## 2024-04-09 DIAGNOSIS — G30.9 ALZHEIMER'S DISEASE, UNSPECIFIED (CODE) (H): ICD-10-CM

## 2024-04-09 DIAGNOSIS — Z13.1 SCREENING FOR DIABETES MELLITUS: ICD-10-CM

## 2024-04-09 DIAGNOSIS — E78.00 HYPERCHOLESTEROLEMIA: ICD-10-CM

## 2024-04-09 DIAGNOSIS — F34.1 DYSTHYMIA: ICD-10-CM

## 2024-04-09 DIAGNOSIS — Z12.5 SCREENING FOR PROSTATE CANCER: ICD-10-CM

## 2024-04-09 LAB — HBA1C MFR BLD: 4.7 % (ref 0–5.6)

## 2024-04-09 PROCEDURE — G0103 PSA SCREENING: HCPCS | Mod: ORL | Performed by: FAMILY MEDICINE

## 2024-04-09 PROCEDURE — 84443 ASSAY THYROID STIM HORMONE: CPT | Mod: ORL | Performed by: FAMILY MEDICINE

## 2024-04-09 PROCEDURE — 80061 LIPID PANEL: CPT | Mod: ORL | Performed by: FAMILY MEDICINE

## 2024-04-09 PROCEDURE — 80048 BASIC METABOLIC PNL TOTAL CA: CPT | Mod: ORL | Performed by: FAMILY MEDICINE

## 2024-04-09 PROCEDURE — 84460 ALANINE AMINO (ALT) (SGPT): CPT | Mod: ORL | Performed by: FAMILY MEDICINE

## 2024-04-09 RX ORDER — SERTRALINE HYDROCHLORIDE 25 MG/1
25 TABLET, FILM COATED ORAL DAILY
Qty: 90 TABLET | Refills: 3 | Status: SHIPPED | OUTPATIENT
Start: 2024-04-09

## 2024-04-09 SDOH — HEALTH STABILITY: PHYSICAL HEALTH: ON AVERAGE, HOW MANY DAYS PER WEEK DO YOU ENGAGE IN MODERATE TO STRENUOUS EXERCISE (LIKE A BRISK WALK)?: 1 DAY

## 2024-04-09 ASSESSMENT — SOCIAL DETERMINANTS OF HEALTH (SDOH): HOW OFTEN DO YOU GET TOGETHER WITH FRIENDS OR RELATIVES?: ONCE A WEEK

## 2024-04-09 NOTE — LETTER
April 12, 2024      Fredrick Janine  [address]  [facility] [facility] [address]        Hi Fredrick,     It was great to see you 2 days ago!  Here is a copy of your laboratory results with my comments included.     Overall, your basic metabolic panel looks quite good.  I think that you might have been a little bit dehydrated, as you have been in the past.  Your urea nitrogen and creatinine levels, both kidney function test, was slightly elevated.  Simultaneously, your GFR estimate, a way of indicating how well your kidneys are filtering, was decreased.  This pattern has been present previously.  Overall, I am not too worried about this as it has cleared in the past.  The next time we do this lab test, I would like you to drink 8 to 12 ounces of water approximately 30 to 60 minutes before you come in to have it done.     Your glucose or sugar level was a little elevated.  However, your A1c level, a way of measuring your glucose level over time, was wonderfully normal at just 4.7%.     Overall, your lipid or cholesterol panel looks great!  Your liver function tests, the ALT level, is perfectly normal at 18.     Your prostate level remains undetectably low at less than 0.01.     Take care!     Pieter Hamilton   Basic metabolic panel   Result Value Ref Range    Sodium 144 135 - 145 mmol/L      Comment:      Reference intervals for this test were updated on 09/26/2023 to more accurately reflect our healthy population. There may be differences in the flagging of prior results with similar values performed with this method. Interpretation of those prior results can be made in the context of the updated reference intervals.     Potassium 4.6 3.4 - 5.3 mmol/L    Chloride 107 98 - 107 mmol/L    Carbon Dioxide (CO2) 23 22 - 29 mmol/L    Anion Gap 14 7 - 15 mmol/L    Urea Nitrogen 27.0 (H) 8.0 - 23.0 mg/dL    Creatinine 1.45 (H) 0.67 - 1.17 mg/dL    GFR Estimate 48 (L) >60 mL/min/1.73m2    Calcium 9.3 8.8 - 10.2  mg/dL    Glucose 146 (H) 70 - 99 mg/dL   Lipid panel   Result Value Ref Range    Cholesterol 173 <200 mg/dL    Triglycerides 157 (H) <150 mg/dL    Direct Measure HDL 42 >=40 mg/dL    LDL Cholesterol Calculated 100 <=100 mg/dL    Non HDL Cholesterol 131 (H) <130 mg/dL    Patient Fasting > 8hrs? No     Narrative    Cholesterol  Desirable:  <200 mg/dL    Triglycerides  Normal:  Less than 150 mg/dL  Borderline High:  150-199 mg/dL  High:  200-499 mg/dL  Very High:  Greater than or equal to 500 mg/dL    Direct Measure HDL  Female:  Greater than or equal to 50 mg/dL   Male:  Greater than or equal to 40 mg/dL    LDL Cholesterol  Desirable:  <100mg/dL  Above Desirable:  100-129 mg/dL   Borderline High:  130-159 mg/dL   High:  160-189 mg/dL   Very High:  >= 190 mg/dL    Non HDL Cholesterol  Desirable:  130 mg/dL  Above Desirable:  130-159 mg/dL  Borderline High:  160-189 mg/dL  High:  190-219 mg/dL  Very High:  Greater than or equal to 220 mg/dL   TSH with free T4 reflex   Result Value Ref Range    TSH 1.45 0.30 - 4.20 uIU/mL   Prostate spec antigen screen   Result Value Ref Range    Prostate Specific Antigen Screen <0.01 ng/mL      Comment:      No reference ranges have been established for patients over 80 years.    Narrative    This result is obtained using the Roche Elecsys total PSA method on the melody [id number] immunoassay analyzer. Results obtained with different assay methods or kits cannot be used interchangeably.   Hemoglobin A1c   Result Value Ref Range    Hemoglobin A1C 4.7 0.0 - 5.6 %      Comment:      Normal <5.7%   Prediabetes 5.7-6.4%    Diabetes 6.5% or higher     Note: Adopted from ADA consensus guidelines.   ALT   Result Value Ref Range    ALT 18 0 - 70 U/L       If you have any questions or concerns, please call the clinic at the number listed above.       Sincerely,      Pieter Ulloa MD

## 2024-04-09 NOTE — NURSING NOTE
Fredrick  83 year old    Chief Complaint   Patient presents with    Wellness Visit    Consult     Pt's wife would like to discuss antidepressant medications for Fredrick    Polydipsia     Pt's wife would like to check for diabetes due to increased thirst in the past year    Tremors     Noticeable in hands            Blood pressure 100/66, pulse 75, temperature 98  F (36.7  C), temperature source Skin, weight 72.6 kg (160 lb), SpO2 96%. Body mass index is 22.32 kg/m .    Patient Active Problem List   Diagnosis    Atrial fibrillation (H)    Hypercholesterolemia    Hypothyroidism    Glaucoma    Long-term (current) use of anticoagulants [Z79.01]    Chronic atrial fibrillation (H)    Acquired hypothyroidism    Memory loss    Primary open angle glaucoma of both eyes, moderate stage    Medicare annual wellness visit, subsequent    Other urinary incontinence    Dizziness    Alzheimer's disease, unspecified (CODE) (H)              Wt Readings from Last 2 Encounters:   04/09/24 72.6 kg (160 lb)   08/21/23 73.8 kg (162 lb 9.6 oz)       BP Readings from Last 3 Encounters:   04/09/24 100/66   09/19/23 114/72   08/21/23 127/75                Current Outpatient Medications   Medication Sig Dispense Refill    acetaminophen (TYLENOL) 325 MG tablet Take 1-2 tablets (325-650 mg) by mouth every 6 hours as needed for mild pain (Patient taking differently: Take 650 mg by mouth every 6 hours as needed for mild pain)      apixaban ANTICOAGULANT (ELIQUIS) 5 MG tablet Take 1 tablet (5 mg) by mouth 2 times daily 14 tablet 0    Cetirizine HCl (ZYRTEC ALLERGY PO) Take 10 mg by mouth daily as needed      donepezil (ARICEPT) 10 MG tablet Take 1 tablet (10 mg) by mouth At Bedtime 30 tablet 0    latanoprost (XALATAN) 0.005 % ophthalmic solution Place 1 drop into both eyes At Bedtime      levothyroxine (SYNTHROID/LEVOTHROID) 50 MCG tablet Take 1 tablet (50 mcg) by mouth daily 90 tablet 4    memantine (NAMENDA) 10 MG tablet Take 10 mg by mouth At Bedtime    "   pravastatin (PRAVACHOL) 10 MG tablet Take 1 tablet (10 mg) by mouth daily 90 tablet 0    pravastatin (PRAVACHOL) 20 MG tablet TAKE ONE TABLET BY MOUTH EVERY EVENING. TAKE TOGETHER WITH 10MG 90 tablet 3     No current facility-administered medications for this visit.              Social History     Tobacco Use    Smoking status: Former     Packs/day: 1.00     Years: 28.00     Additional pack years: 0.00     Total pack years: 28.00     Types: Cigarettes     Start date:      Quit date:      Years since quittin.2    Smokeless tobacco: Never   Substance Use Topics    Alcohol use: Yes     Alcohol/week: 7.0 standard drinks of alcohol     Types: 7 Standard drinks or equivalent per week     Comment: 1 drink of wine per day    Drug use: No              Health Maintenance Due   Topic Date Due    IPV IMMUNIZATION (2 of 3 - Adult catch-up series) 1997    RSV VACCINE (Pregnancy & 60+) (1 - 1-dose 60+ series) Never done    INFLUENZA VACCINE (1) 2023    MEDICARE ANNUAL WELLNESS VISIT  01/10/2024    LIPID  01/10/2024    TSH W/FREE T4 REFLEX  01/10/2024            No results found for: \"PAP\"           2024 1:58 PM  "

## 2024-04-09 NOTE — PROGRESS NOTES
Preventive Care Visit  [facility]  Pieter Ulloa MD, Family Medicine  2024      Subjective   Fredrick is a 83 year old, presenting for the following:  Wellness Visit, Consult (Pt's wife would like to discuss antidepressant medications for Fredrick), Polydipsia (Pt's wife would like to check for diabetes due to increased thirst in the past year), and Tremors (Noticeable in hands)    HPI    Fredrick is an 83-year-old here today for his Medicare annual wellness visit, subsequent.  He is accompanied by his wife.    He has profound Alzheimer's dementia.  Despite that, he is doing quite well.  He maintains a fairly happy, upbeat affect.  However, his wife is not some support groups and virtually all of her friends have loved ones who are on an antidepressant.  At this point, Fredrick is not.  He has been followed closely by neurology here at the [facility].  We discussed the pros and cons of going on something like sertraline. she would like to do it.  L She is making this decision on behalf of Fredrick as he is not able to consent to it given his profound confusion.  Potential side effects were discussed.    In addition, she would like to check Fredrick for diabetes that she is thinking that he is displaying more thirst in the past year.  We can easily go ahead and check both basic metabolic panel as well as an A1c level.    Finally, she has noticed that he has a bit of a tremor in his hands.  He has  A family history of h familial tremor.  We had a good conversation about that versus something like Parkinson's disease.  His tremor is not at rest.  It typically occurs when he is reaching out and doing something.    Social History     Tobacco Use    Smoking status: Former     Packs/day: 1.00     Years: 28.00     Additional pack years: 0.00     Total pack years: 28.00     Types: Cigarettes     Start date:      Quit date:      Years since quittin.2    Smokeless tobacco: Never   Substance Use Topics  "   Alcohol use: Yes     Comment: 1 drink of wine per day    Drug use: No       The following health maintenance items are reviewed in Epic and correct as of today:  Health Maintenance   Topic Date Due    IPV IMMUNIZATION (2 of 3 - Adult catch-up series) 05/29/1997    RSV VACCINE (Pregnancy & 60+) (1 - 1-dose 60+ series) Never done    INFLUENZA VACCINE (1) 09/01/2023    PHQ-2 (once per calendar year)  01/01/2024    MEDICARE ANNUAL WELLNESS VISIT  01/10/2024    LIPID  01/10/2024    TSH W/FREE T4 REFLEX  01/10/2024    FALL RISK ASSESSMENT  01/10/2024    DTAP/TDAP/TD IMMUNIZATION (2 - Td or Tdap) 10/29/2025    ADVANCE CARE PLANNING  08/08/2028    Pneumococcal Vaccine: 65+ Years  Completed    ZOSTER IMMUNIZATION  Completed    COVID-19 Vaccine  Completed    HPV IMMUNIZATION  Aged Out    MENINGITIS IMMUNIZATION  Aged Out    RSV MONOCLONAL ANTIBODY  Aged Out         Review of Systems  Constitutional, neuro, ENT, endocrine, pulmonary, cardiac, gastrointestinal, genitourinary, musculoskeletal, integument and psychiatric systems are negative, except as otherwise noted.     Objective    Exam  /66 (BP Location: Left arm, Patient Position: Sitting, Cuff Size: Adult Regular)   Pulse 75   Temp 98  F (36.7  C) (Skin)   Wt 72.6 kg (160 lb)   SpO2 96%   BMI 22.32 kg/m       Estimated body mass index is 22.32 kg/m  as calculated from the following:    Height as of 8/21/23: 1.803 m (5' 11\").    Weight as of this encounter: 72.6 kg (160 lb).    Physical Exam  GENERAL: alert and no distress; not oriented due to right ear's.  Very pleasant and happy.  EYES: Eyes grossly normal to inspection, PERRL and conjunctivae and sclerae normal  NECK: no adenopathy, no asymmetry, masses, or scars  RESP: lungs clear to auscultation - no rales, rhonchi or wheezes  CV: regular rate and rhythm, normal S1 S2, no S3 or S4, no murmur, click or rub, no peripheral edema  MS: no gross musculoskeletal defects noted, no edema  SKIN: no suspicious " lesions or rashes  NEURO: Normal strength and tone, mentation intact and speech normal; no resting tremor;   No cogwheeling.  No stiffness with pronation and supination of his hands and forearms.  PSYCH: mentation appears normal, affect normal/bright      Laboratory studies: BMP, A1c, lipid panel, ALT, TSH, and PSA, all pending      Assessment/Plan:    Fredrick is an 83-year-old here today for his Medicare annual wellness visit, subsequent.  Overall, he is doing quite well.    From a healthcare maintenance standpoint, up-to-date with recommended strategies.  I recommend that he receive the RSV vaccine at his pharmacy.    He has very significant Alzheimer's disease, he is followed by neurology at the [facility].  He will continue to take his donepezil 10 mg daily as well as his memantine 10 mg at bedtime.  In addition, I will add sertraline 25 mg once daily to his regimen.  Will see how he does with that.    History of hyperlipidemia, hypothyroidism, glaucoma, and atrial fibrillation, all treated with various medications.    We notified the laboratory results when they are back.    I look forward to seeing Fredrick back in a year.  However, he and his wife will reach out the meantime with any questions or concerns.    Signed Electronically by: Pieter Ulloa MD

## 2024-04-10 LAB
ALT SERPL W P-5'-P-CCNC: 18 U/L (ref 0–70)
ANION GAP SERPL CALCULATED.3IONS-SCNC: 14 MMOL/L (ref 7–15)
BUN SERPL-MCNC: 27 MG/DL (ref 8–23)
CALCIUM SERPL-MCNC: 9.3 MG/DL (ref 8.8–10.2)
CHLORIDE SERPL-SCNC: 107 MMOL/L (ref 98–107)
CHOLEST SERPL-MCNC: 173 MG/DL
CREAT SERPL-MCNC: 1.45 MG/DL (ref 0.67–1.17)
DEPRECATED HCO3 PLAS-SCNC: 23 MMOL/L (ref 22–29)
EGFRCR SERPLBLD CKD-EPI 2021: 48 ML/MIN/1.73M2
FASTING STATUS PATIENT QL REPORTED: NO
GLUCOSE SERPL-MCNC: 146 MG/DL (ref 70–99)
HDLC SERPL-MCNC: 42 MG/DL
LDLC SERPL CALC-MCNC: 100 MG/DL
NONHDLC SERPL-MCNC: 131 MG/DL
POTASSIUM SERPL-SCNC: 4.6 MMOL/L (ref 3.4–5.3)
PSA SERPL DL<=0.01 NG/ML-MCNC: <0.01 NG/ML
SODIUM SERPL-SCNC: 144 MMOL/L (ref 135–145)
TRIGL SERPL-MCNC: 157 MG/DL
TSH SERPL DL<=0.005 MIU/L-ACNC: 1.45 UIU/ML (ref 0.3–4.2)

## 2024-05-01 DIAGNOSIS — E03.9 HYPOTHYROIDISM, UNSPECIFIED TYPE: ICD-10-CM

## 2024-05-01 RX ORDER — LEVOTHYROXINE SODIUM 50 UG/1
50 TABLET ORAL DAILY
Qty: 90 TABLET | Refills: 3 | Status: SHIPPED | OUTPATIENT
Start: 2024-05-01

## 2024-05-01 NOTE — TELEPHONE ENCOUNTER
Medication requested: levothyroxine (SYNTHROID/LEVOTHROID) 50 MCG tablet   Last office visit: 4/9/24  [facility] appointments: none  Medication last refilled: 1/12/23; 90 + 4 refills  Last qualifying labs:   Component      Latest Ref Rng 4/9/2024  2:42 PM   TSH      0.30 - 4.20 uIU/mL 1.45      Prescription approved per [facility] Refill Protocol.    Arnold HOGUE, RN  05/01/24 11:54 AM

## 2024-05-03 ENCOUNTER — TELEPHONE (OUTPATIENT)
Dept: CARDIOLOGY | Facility: CLINIC | Age: 84
End: 2024-05-03
Payer: COMMERCIAL

## 2024-05-03 DIAGNOSIS — I48.0 PAROXYSMAL ATRIAL FIBRILLATION (H): ICD-10-CM

## 2024-05-28 DIAGNOSIS — E78.2 MIXED HYPERLIPIDEMIA: ICD-10-CM

## 2024-05-28 DIAGNOSIS — E78.00 HYPERCHOLESTEROLEMIA: ICD-10-CM

## 2024-05-29 RX ORDER — PRAVASTATIN SODIUM 10 MG
10 TABLET ORAL DAILY
Qty: 90 TABLET | Refills: 3 | Status: SHIPPED | OUTPATIENT
Start: 2024-05-29

## 2024-05-29 NOTE — TELEPHONE ENCOUNTER
Medication requested: pravastatin (PRAVACHOL) 10 MG tablet   Last office visit: 4/9/24  [facility] appointments: none  Medication last refilled: 3/15/24; 90 + 0 refills  Last qualifying labs:   Component      Latest Ref Rng 4/9/2024  2:42 PM   Cholesterol      <200 mg/dL 173    Triglycerides      <150 mg/dL 157 (H)    HDL Cholesterol      >=40 mg/dL 42    LDL Cholesterol Calculated      <=100 mg/dL 100    Non HDL Cholesterol      <130 mg/dL 131 (H)    Patient Fasting? No      Prescription approved per [facility] Refill Protocol.    Arnold HOGUE, RN  05/29/24 4:29 PM

## 2024-06-05 ENCOUNTER — TELEPHONE (OUTPATIENT)
Dept: FAMILY MEDICINE | Facility: CLINIC | Age: 84
End: 2024-06-05

## 2024-06-05 DIAGNOSIS — E78.00 HYPERCHOLESTEROLEMIA: ICD-10-CM

## 2024-06-05 DIAGNOSIS — E78.2 MIXED HYPERLIPIDEMIA: ICD-10-CM

## 2024-06-05 RX ORDER — PRAVASTATIN SODIUM 20 MG
TABLET ORAL
Qty: 90 TABLET | Refills: 3 | Status: SHIPPED | OUTPATIENT
Start: 2024-06-05

## 2024-06-05 NOTE — TELEPHONE ENCOUNTER
Medication questions (route to triage team)    Who is calling - Rosalva  Full medication name and dosage - pravastatin (PRAVACHOL) 10 MG tablet   Question/clarification needed - Sonam spoke with [facility] and they said they need authorization  Name and location of pharmacy   [id number] - [place], [place] - [address] [facility]  [address] [address] [facility] [address]  Phone: [phone] Fax: [phone]    [facility] #[id number] - [place], [place] - [address] [facility] [address]  [address]  [facility] [address]  Phone: [phone] Fax: [phone]    [facility] - [place], [place] - [address] [facility]  [address] [facility] [address]  Phone: [phone] Fax: [phone]    [facility] - [place], [place] - [address] [place] [address]  [address] [address]  Phone: [phone] Fax: [phone]    [id number] - [place], [place] - [address] [facility] [address]  [address]  [facility] [address]  Phone: [phone] Fax: [phone]    [facility] - [place], [place] - [address]  [address] [place] [address]  Phone: [phone] Fax: [phone]   Ok to leave a message on VM? Yes

## 2024-06-05 NOTE — TELEPHONE ENCOUNTER
Medication requested: pravastatin (PRAVACHOL) 20 MG tablet   Last office visit: 4/9/24  [facility] appointments: none  Medication last refilled: 1/18/23; 90 + 3 refills  Last qualifying labs:   Component      Latest Ref Rng 4/9/2024  2:42 PM   Cholesterol      <200 mg/dL 173    Triglycerides      <150 mg/dL 157 (H)    HDL Cholesterol      >=40 mg/dL 42    LDL Cholesterol Calculated      <=100 mg/dL 100    Non HDL Cholesterol      <130 mg/dL 131 (H)    Patient Fasting? No      Prescription approved per [facility] Refill Protocol.    Arnold HOGUE, RN  06/05/24 11:07 AM

## 2024-07-08 ENCOUNTER — TELEPHONE (OUTPATIENT)
Dept: CARDIOLOGY | Facility: CLINIC | Age: 84
End: 2024-07-08
Payer: COMMERCIAL

## 2024-07-08 DIAGNOSIS — I48.0 PAROXYSMAL ATRIAL FIBRILLATION (H): ICD-10-CM

## 2024-07-08 NOTE — TELEPHONE ENCOUNTER
Health Call Center    Phone Message    May a detailed message be left on voicemail: yes     Reason for Call: Medication Refill Request    Has the patient contacted the pharmacy for the refill? Yes   Name of medication being requested: apixaban ANTICOAGULANT (ELIQUIS) 5 MG tablet   Provider who prescribed the medication: Dr Moscoso  Pharmacy:      [facility] - [address]      Date medication is needed: ASAP pt is out of medication        Action Taken: Other: Cardiology    Travel Screening: Not Applicable     Date of Service:             Thank you!  [facility]

## 2024-07-10 ENCOUNTER — TRANSFERRED RECORDS (OUTPATIENT)
Dept: HEALTH INFORMATION MANAGEMENT | Facility: CLINIC | Age: 84
End: 2024-07-10
Payer: COMMERCIAL

## 2024-07-22 ENCOUNTER — TELEPHONE (OUTPATIENT)
Dept: CARDIOLOGY | Facility: CLINIC | Age: 84
End: 2024-07-22
Payer: COMMERCIAL

## 2024-07-22 DIAGNOSIS — I48.0 PAROXYSMAL ATRIAL FIBRILLATION (H): ICD-10-CM

## 2024-07-22 NOTE — TELEPHONE ENCOUNTER
[facility] Call [facility]    Phone Message    May a detailed message be left on voicemail: yes     Reason for Call: Medication Refill Request    Has the patient contacted the pharmacy for the refill? Yes   Name of medication being requested: apixaban ANTICOAGULANT (ELIQUIS) 5 MG tablet    Provider who prescribed the medication: Joseluis Moscoso    Pharmacy: [facility] - [address]    Date medication is needed: ASAP    Pt is scheduled for next available follow up with Danis on 10/29/24.    Action Taken: Message routed to:  [facility]    Travel Screening: Not Applicable     Date of Service:

## 2024-07-26 ENCOUNTER — NURSE TRIAGE (OUTPATIENT)
Dept: FAMILY MEDICINE | Facility: CLINIC | Age: 84
End: 2024-07-26

## 2024-07-26 DIAGNOSIS — N39.498 OTHER URINARY INCONTINENCE: Primary | ICD-10-CM

## 2024-07-26 NOTE — TELEPHONE ENCOUNTER
General (use ONLY if request does not fit into other dot phrase categories)    Who is calling - Rosalva  Reason for call - Incontinence  Action desired - Incontinence specialist recommendation   Return call needed? Yes  Advised patient that response may take up to 1-2 business days? Yes  Ok to leave a message on VM? Yes

## 2024-07-29 NOTE — TELEPHONE ENCOUNTER
"Wife believes he had possible UTI symptoms on 7/26/24 that are now calming down.   Denies burning, discomfort, fever and blood in urine. He has had several episodes of incontinence.   Wife is requesting a referral to Urology. She reports they had a referral years ago but the incontinence has increased since then.   Advised bringing in pt today but wife says they have no transportation and she wants to go straight to a specialist and is resistant to two appointments for the same issue.    Answer Assessment - Initial Assessment Questions  1. SYMPTOM: \"What's the main symptom you're concerned about?\" (e.g., frequency, incontinence)      Incontinence  2. ONSET: \"When did the  incontinence start?\"      At least 10 years ago but worsening over the past week.  3. PAIN: \"Is there any pain?\" If Yes, ask: \"How bad is it?\" (Scale: 1-10; mild, moderate, severe)      No pain  4. CAUSE: \"What do you think is causing the symptoms?\"      UTI?  5. OTHER SYMPTOMS: \"Do you have any other symptoms?\" (e.g., blood in urine, fever, flank pain, pain with urination)      No blood, afebrile, denies pain  6. PREGNANCY: \"Is there any chance you are pregnant?\" \"When was your last menstrual period?\"      no    Protocols used: Urinary Symptoms--    LENNIE Brown, RN  07/29/24, 11:06 AM    "

## 2024-07-30 ENCOUNTER — TELEPHONE (OUTPATIENT)
Dept: CARDIOLOGY | Facility: CLINIC | Age: 84
End: 2024-07-30
Payer: COMMERCIAL

## 2024-07-30 DIAGNOSIS — I48.0 PAROXYSMAL ATRIAL FIBRILLATION (H): ICD-10-CM

## 2024-07-30 NOTE — TELEPHONE ENCOUNTER
[facility] Call Center    Phone Message    May a detailed message be left on voicemail: yes     Reason for Call: Medication Refill Request    Has the patient contacted the pharmacy for the refill? Yes   Name of medication being requested: apixaban ANTICOAGULANT (ELIQUIS) 5 MG tablet  Provider who prescribed the medication: Danis  Pharmacy:  [facility] - [address]   Date medication is needed: 7/30/24    Action Taken: Message routed to:  Other: Cardiology    Travel Screening: Not Applicable     Thank you!  [facility]

## 2024-08-28 ENCOUNTER — OFFICE VISIT (OUTPATIENT)
Dept: UROLOGY | Facility: CLINIC | Age: 84
End: 2024-08-28
Attending: FAMILY MEDICINE
Payer: COMMERCIAL

## 2024-08-28 VITALS
SYSTOLIC BLOOD PRESSURE: 98 MMHG | DIASTOLIC BLOOD PRESSURE: 66 MMHG | OXYGEN SATURATION: 97 % | HEART RATE: 66 BPM | HEIGHT: 71 IN | BODY MASS INDEX: 23.38 KG/M2 | WEIGHT: 167 LBS

## 2024-08-28 DIAGNOSIS — N39.498 OTHER URINARY INCONTINENCE: ICD-10-CM

## 2024-08-28 PROCEDURE — 51798 US URINE CAPACITY MEASURE: CPT | Performed by: NURSE PRACTITIONER

## 2024-08-28 PROCEDURE — 99203 OFFICE O/P NEW LOW 30 MIN: CPT | Mod: 25 | Performed by: NURSE PRACTITIONER

## 2024-08-28 ASSESSMENT — PAIN SCALES - GENERAL: PAINLEVEL: NO PAIN (0)

## 2024-08-28 NOTE — NURSING NOTE
Chief Complaint   Patient presents with    Urinary Incontinece     Patient is here with his wife Rosalva who states patient has Alzheimer's.   Patient was unable to leave sample.    Patient's wife states patient has been having incontinence for a while.  Wife reports patient has been having increased accidents during the night.  Wife reports patient having bladder infection back in August and ended up in the hospital with sepsis. Wife also reports patient not having symptoms when he had bladder infections.    PVR:13mL        Becky Willis MA on 8/28/2024 at 2:14 PM

## 2024-08-28 NOTE — LETTER
8/28/2024       RE: Fredrick Mehta  [address]  [facility] [address]     Dear Colleague,    Thank you for referring your patient, Fredrick Mehta, to the [facility] at [facility]. Please see a copy of my visit note below.      Urology Outpatient Visit    Name: Fredrick Mehta    MRN: [id number]   YOB: 1940               Chief Complaint:   Incontinence         Impression and Plan:   Impression / Plan:   Fredrick Mehta is a 83 year old male with mild urinary incontinence      Plan:  -We discussed use of Depends. Mentioned the importance of maintaining good hygiene practices when using Depends, including regular changing, cleansing the perineal area, and avoiding prolonged exposure to moisture to prevent skin irritation.    Medical Devices-  Discussed trial of condom catheter. Also discussed trial of [place] Adhesive External condom catheters.  Discussed the use of penile clamps as adjunctive measures to prevent leakage during activities that provoke incontinence.  Discussed management via chronic indwelling catheter.     We discussed the use of various pharmacotherapeutics.    Surgical Options-  Urethral Sling Procedure:  Explained the procedure involves the placement of a supportive sling beneath the urethra to provide additional support and improve continence.  Reviewed potential complications (e.g., sling erosion, urinary retention)    Artificial Urinary Sphincter (AUS) Implantation:  Explained AUS implantation involves surgically placing an inflatable cuff around the urethra, a pressure-regulating balloon in the pelvis, and a control pump in the scrotum to restore urinary continence.  Discussed risks of procedure (e.g., device malfunction, cuff erosion)..    After shared decision making process they elect to go forth with continued use of depends as this has been doing an adequate job.     Thank you for the opportunity  to participate in the care of Fredrick Mehta.     SAMUEL Mackay, CNP  [facility] - Department of Urology  [phone]          History of Present Illness:   Fredrick Mehta is a 83 year old male with history of Alzheimer's, a-fib on Eliquis,  prostate cancer s/p perineal prostatectomy in  (PSA remains undetectable), Hx Selective laser trabeculoplasty , here today to discuss his urinary incontinence. Previously seen by Dr. Small in 2019 for this. Pt was being considered for sling and it was planned for patient to undergo urodynamics at that time but was lost to follow-up. Former smoker, 1 ppd, quit in . Due to Alzheimer's diagnosis he is unable to tell much whether this is stress or urgency associated. It happens throughout the day and he wears pads but it is unclear how much volume is actually leaked. Detrol did not help from his PCP. Voids q2-3 hours. Nocturia x1. No dysuria or hematuria. Stream is okay, no straining. No UTIs. Feels like he empties. No constipation.  He is unable to leave sample for UA today.     History is obtained from patient & EMR          Past Medical History:     Past Medical History:   Diagnosis Date     Anatomical narrow angle      Benign positional vertigo      Cataracts      Glaucoma     OAG     Hypercholesterolemia      Migraine      Paroxysmal atrial fibrillation (H)           Past Surgical History:     Past Surgical History:   Procedure Laterality Date     PROSTATE SURGERY       SELECTIVE LASER TRABECULOPLASTY (SLT) OD (RIGHT EYE)  2007    RE          Social History:     Social History     Tobacco Use     Smoking status: Former     Current packs/day: 0.00     Average packs/day: 1 pack/day for 28.0 years (28.0 ttl pk-yrs)     Types: Cigarettes     Start date:      Quit date:      Years since quittin.6     Passive exposure: Past     Smokeless tobacco: Never   Substance Use Topics     Alcohol use: Yes     Alcohol/week: 7.0 standard drinks of alcohol      Types: 7 Standard drinks or equivalent per week     Comment: 1 drink of wine per day          Family History:     Family History   Problem Relation Age of Onset     Glaucoma Maternal Grandfather      Macular Degeneration No family hx of           Allergies:     Allergies   Allergen Reactions     Hay Fever & [A.R.M.]      Latex      Latex Unknown     Seasonal Allergies           Medications:     Current Outpatient Medications   Medication Sig Dispense Refill     acetaminophen (TYLENOL) 325 MG tablet Take 1-2 tablets (325-650 mg) by mouth every 6 hours as needed for mild pain (Patient taking differently: Take 650 mg by mouth every 6 hours as needed for mild pain)       apixaban ANTICOAGULANT (ELIQUIS) 5 MG tablet Take 1 tablet (5 mg) by mouth 2 times daily Appointment required for further refills 180 tablet 1     Cetirizine HCl (ZYRTEC ALLERGY PO) Take 10 mg by mouth daily as needed       donepezil (ARICEPT) 10 MG tablet Take 1 tablet (10 mg) by mouth At Bedtime 30 tablet 0     latanoprost (XALATAN) 0.005 % ophthalmic solution Place 1 drop into both eyes At Bedtime       levothyroxine (SYNTHROID/LEVOTHROID) 50 MCG tablet Take 1 tablet (50 mcg) by mouth daily 90 tablet 3     memantine (NAMENDA) 10 MG tablet Take 10 mg by mouth At Bedtime       pravastatin (PRAVACHOL) 10 MG tablet Take 1 tablet (10 mg) by mouth daily 90 tablet 3     pravastatin (PRAVACHOL) 20 MG tablet TAKE ONE TABLET BY MOUTH EVERY EVENING. TAKE TOGETHER WITH 10MG 90 tablet 3     sertraline (ZOLOFT) 25 MG tablet Take 1 tablet (25 mg) by mouth daily 90 tablet 3     No current facility-administered medications for this visit.          Review of Systems:    ROS: See HPI for pertinent details.  Remainder of 10-point ROS negative.         Physical Exam:   VS:  T: Data Unavailable    HR: Data Unavailable    BP: Data Unavailable    RR: Data Unavailable     GEN:  Alert.  NAD.   HEENT:  Sclerae anicteric.    CV:  No obvious jugular venous  distension.  LUNGS: No respiratory distress, breathing comfortably wo accessory muscle use.  ABD:  ND.     SKIN:  Dry. No visible rashes.   NEURO:  CN grossly intact.          Laboratory Data:     Lab Results   Component Value Date    PSA <0.01 04/09/2024    PSA <0.01 10/19/2021    PSA <0.01 04/24/2019    PSA <0.01 10/17/2017    PSA <0.01 10/29/2015    PSA 0.01 10/20/2014    PSA  12/13/2013     <0.07  PSA results are about 7% lower than our prior method due to a methodology change   on August 30, 2011.    PSA  02/09/2012     <0.07  PSA results are about 7% lower than our prior method due to a methodology change   on August 30, 2011.    PSA <0.10 01/24/2011    PSA <0.10 03/05/2010    PSA <0.10 01/28/2009    PSA <0.10 07/09/2008     Lab Results   Component Value Date    CR 1.45 04/09/2024    CR 1.13 08/09/2023    CR 1.09 08/07/2023    CR 1.27 08/06/2023    CR 1.32 08/05/2023    CR 1.34 08/04/2023    CR 1.25 08/03/2023    CR 1.13 08/02/2023    CR 1.35 08/01/2023    CR 1.52 01/10/2023    CR 0.9 08/12/2020    CR 1.1 10/17/2017    CR 1.2 09/13/2016    CR 1.14 10/29/2015    CR 1.3 10/20/2014    CR 1.16 08/15/2014    CR 1.05 01/11/2012    CR 1.22 01/28/2009    CR 1.06 12/16/2007    CR 1.12 11/28/2007     Lab Results   Component Value Date    GFRESTIMATED 48 04/09/2024    GFRESTIMATED 64 08/09/2023    GFRESTIMATED 67 08/07/2023    GFRESTIMATED 56 08/06/2023    GFRESTIMATED 54 08/05/2023    GFRESTIMATED 53 08/04/2023    GFRESTIMATED 57 08/03/2023    GFRESTIMATED 64 08/02/2023    GFRESTIMATED 52 08/01/2023    GFRESTIMATED 45 01/10/2023    GFRESTIMATED 62 10/29/2015    GFRESTIMATED 61 08/15/2014    GFRESTIMATED 69 01/11/2012    GFRESTIMATED 59 01/28/2009    GFRESTIMATED 74 12/16/2007    GFRESTIMATED 70 11/28/2007    GFRESTIMATED 55 04/11/2007            Again, thank you for allowing me to participate in the care of your patient.      Sincerely,    SAMUEL Jamison CNP

## 2024-08-28 NOTE — PROGRESS NOTES
Urology Outpatient Visit    Name: Fredrick Mehta    MRN: [id number]   YOB: 1940               Chief Complaint:   Incontinence         Impression and Plan:   Impression / Plan:   Fredrick Mehta is a 83 year old male with mild urinary incontinence      Plan:  -We discussed use of Depends. Mentioned the importance of maintaining good hygiene practices when using Depends, including regular changing, cleansing the perineal area, and avoiding prolonged exposure to moisture to prevent skin irritation.    Medical Devices-  Discussed trial of condom catheter. Also discussed trial of [place] Adhesive External condom catheters.  Discussed the use of penile clamps as adjunctive measures to prevent leakage during activities that provoke incontinence.  Discussed management via chronic indwelling catheter.     We discussed the use of various pharmacotherapeutics.    Surgical Options-  Urethral Sling Procedure:  Explained the procedure involves the placement of a supportive sling beneath the urethra to provide additional support and improve continence.  Reviewed potential complications (e.g., sling erosion, urinary retention)    Artificial Urinary Sphincter (AUS) Implantation:  Explained AUS implantation involves surgically placing an inflatable cuff around the urethra, a pressure-regulating balloon in the pelvis, and a control pump in the scrotum to restore urinary continence.  Discussed risks of procedure (e.g., device malfunction, cuff erosion)..    After shared decision making process they elect to go forth with continued use of depends as this has been doing an adequate job.     Thank you for the opportunity to participate in the care of Fredrick Mehta.     SAMUEL Mackay, CNP  [facility] - Department of Urology  [phone]          History of Present Illness:   Fredrick Mehta is a 83 year old male with history of Alzheimer's, a-fib on Eliquis,  prostate cancer s/p perineal prostatectomy in 1999 (PSA  remains undetectable), Hx Selective laser trabeculoplasty , here today to discuss his urinary incontinence. Previously seen by Dr. Small in 2019 for this. Pt was being considered for sling and it was planned for patient to undergo urodynamics at that time but was lost to follow-up. Former smoker, 1 ppd, quit in . Due to Alzheimer's diagnosis he is unable to tell much whether this is stress or urgency associated. It happens throughout the day and he wears pads but it is unclear how much volume is actually leaked. Detrol did not help from his PCP. Voids q2-3 hours. Nocturia x1. No dysuria or hematuria. Stream is okay, no straining. No UTIs. Feels like he empties. No constipation.  He is unable to leave sample for UA today.     History is obtained from patient & EMR          Past Medical History:     Past Medical History:   Diagnosis Date    Anatomical narrow angle     Benign positional vertigo     Cataracts     Glaucoma     OAG    Hypercholesterolemia     Migraine     Paroxysmal atrial fibrillation (H)           Past Surgical History:     Past Surgical History:   Procedure Laterality Date    PROSTATE SURGERY      SELECTIVE LASER TRABECULOPLASTY (SLT) OD (RIGHT EYE)  2007    RE          Social History:     Social History     Tobacco Use    Smoking status: Former     Current packs/day: 0.00     Average packs/day: 1 pack/day for 28.0 years (28.0 ttl pk-yrs)     Types: Cigarettes     Start date:      Quit date:      Years since quittin.6     Passive exposure: Past    Smokeless tobacco: Never   Substance Use Topics    Alcohol use: Yes     Alcohol/week: 7.0 standard drinks of alcohol     Types: 7 Standard drinks or equivalent per week     Comment: 1 drink of wine per day          Family History:     Family History   Problem Relation Age of Onset    Glaucoma Maternal Grandfather     Macular Degeneration No family hx of           Allergies:     Allergies   Allergen Reactions    Hay Fever & [A.R.M.]      Latex     Latex Unknown    Seasonal Allergies           Medications:     Current Outpatient Medications   Medication Sig Dispense Refill    acetaminophen (TYLENOL) 325 MG tablet Take 1-2 tablets (325-650 mg) by mouth every 6 hours as needed for mild pain (Patient taking differently: Take 650 mg by mouth every 6 hours as needed for mild pain)      apixaban ANTICOAGULANT (ELIQUIS) 5 MG tablet Take 1 tablet (5 mg) by mouth 2 times daily Appointment required for further refills 180 tablet 1    Cetirizine HCl (ZYRTEC ALLERGY PO) Take 10 mg by mouth daily as needed      donepezil (ARICEPT) 10 MG tablet Take 1 tablet (10 mg) by mouth At Bedtime 30 tablet 0    latanoprost (XALATAN) 0.005 % ophthalmic solution Place 1 drop into both eyes At Bedtime      levothyroxine (SYNTHROID/LEVOTHROID) 50 MCG tablet Take 1 tablet (50 mcg) by mouth daily 90 tablet 3    memantine (NAMENDA) 10 MG tablet Take 10 mg by mouth At Bedtime      pravastatin (PRAVACHOL) 10 MG tablet Take 1 tablet (10 mg) by mouth daily 90 tablet 3    pravastatin (PRAVACHOL) 20 MG tablet TAKE ONE TABLET BY MOUTH EVERY EVENING. TAKE TOGETHER WITH 10MG 90 tablet 3    sertraline (ZOLOFT) 25 MG tablet Take 1 tablet (25 mg) by mouth daily 90 tablet 3     No current facility-administered medications for this visit.          Review of Systems:    ROS: See HPI for pertinent details.  Remainder of 10-point ROS negative.         Physical Exam:   VS:  T: Data Unavailable    HR: Data Unavailable    BP: Data Unavailable    RR: Data Unavailable     GEN:  Alert.  NAD.   HEENT:  Sclerae anicteric.    CV:  No obvious jugular venous distension.  LUNGS: No respiratory distress, breathing comfortably wo accessory muscle use.  ABD:  ND.     SKIN:  Dry. No visible rashes.   NEURO:  CN grossly intact.          Laboratory Data:     Lab Results   Component Value Date    PSA <0.01 04/09/2024    PSA <0.01 10/19/2021    PSA <0.01 04/24/2019    PSA <0.01 10/17/2017    PSA <0.01  10/29/2015    PSA 0.01 10/20/2014    PSA  12/13/2013     <0.07  PSA results are about 7% lower than our prior method due to a methodology change   on August 30, 2011.    PSA  02/09/2012     <0.07  PSA results are about 7% lower than our prior method due to a methodology change   on August 30, 2011.    PSA <0.10 01/24/2011    PSA <0.10 03/05/2010    PSA <0.10 01/28/2009    PSA <0.10 07/09/2008     Lab Results   Component Value Date    CR 1.45 04/09/2024    CR 1.13 08/09/2023    CR 1.09 08/07/2023    CR 1.27 08/06/2023    CR 1.32 08/05/2023    CR 1.34 08/04/2023    CR 1.25 08/03/2023    CR 1.13 08/02/2023    CR 1.35 08/01/2023    CR 1.52 01/10/2023    CR 0.9 08/12/2020    CR 1.1 10/17/2017    CR 1.2 09/13/2016    CR 1.14 10/29/2015    CR 1.3 10/20/2014    CR 1.16 08/15/2014    CR 1.05 01/11/2012    CR 1.22 01/28/2009    CR 1.06 12/16/2007    CR 1.12 11/28/2007     Lab Results   Component Value Date    GFRESTIMATED 48 04/09/2024    GFRESTIMATED 64 08/09/2023    GFRESTIMATED 67 08/07/2023    GFRESTIMATED 56 08/06/2023    GFRESTIMATED 54 08/05/2023    GFRESTIMATED 53 08/04/2023    GFRESTIMATED 57 08/03/2023    GFRESTIMATED 64 08/02/2023    GFRESTIMATED 52 08/01/2023    GFRESTIMATED 45 01/10/2023    GFRESTIMATED 62 10/29/2015    GFRESTIMATED 61 08/15/2014    GFRESTIMATED 69 01/11/2012    GFRESTIMATED 59 01/28/2009    GFRESTIMATED 74 12/16/2007    GFRESTIMATED 70 11/28/2007    GFRESTIMATED 55 04/11/2007

## 2024-09-12 ENCOUNTER — LAB (OUTPATIENT)
Dept: LAB | Facility: CLINIC | Age: 84
End: 2024-09-12
Payer: COMMERCIAL

## 2024-09-12 DIAGNOSIS — R39.9 UTI SYMPTOMS: Primary | ICD-10-CM

## 2024-09-12 DIAGNOSIS — N39.498 OTHER URINARY INCONTINENCE: ICD-10-CM

## 2024-09-12 LAB
ALBUMIN UR-MCNC: 100 MG/DL
APPEARANCE UR: CLEAR
BILIRUB UR QL STRIP: NEGATIVE
COLOR UR AUTO: YELLOW
GLUCOSE UR STRIP-MCNC: NEGATIVE MG/DL
HGB UR QL STRIP: ABNORMAL
KETONES UR STRIP-MCNC: NEGATIVE MG/DL
LEUKOCYTE ESTERASE UR QL STRIP: ABNORMAL
NITRATE UR QL: POSITIVE
PH UR STRIP: 6 [PH] (ref 5–7)
SP GR UR STRIP: >=1.03 (ref 1–1.03)
UROBILINOGEN UR STRIP-ACNC: 0.2 E.U./DL

## 2024-09-12 PROCEDURE — 87086 URINE CULTURE/COLONY COUNT: CPT

## 2024-09-12 PROCEDURE — 81003 URINALYSIS AUTO W/O SCOPE: CPT | Mod: QW

## 2024-09-13 LAB — BACTERIA UR CULT: NORMAL

## 2024-09-16 ENCOUNTER — TELEPHONE (OUTPATIENT)
Dept: CARDIOLOGY | Facility: CLINIC | Age: 84
End: 2024-09-16
Payer: COMMERCIAL

## 2024-09-16 DIAGNOSIS — I48.0 PAROXYSMAL ATRIAL FIBRILLATION (H): Primary | ICD-10-CM

## 2024-09-19 ENCOUNTER — TELEPHONE (OUTPATIENT)
Dept: UROLOGY | Facility: CLINIC | Age: 84
End: 2024-09-19
Payer: COMMERCIAL

## 2024-09-19 NOTE — TELEPHONE ENCOUNTER
[facility]    Phone Message    May a detailed message be left on voicemail: yes     Reason for Call: Other: pt wife calling and they are looking for lab results, please call     Action Taken: Other: urology    Travel Screening: Not Applicable     Date of Service:

## 2024-10-09 NOTE — PATIENT INSTRUCTIONS
"[facility] Weekly Treatment Plan Review     Date:  24-10/8/24    **Client graduated on 10/8/24     Weekly Treatment Plan Review     Treatment Plan initiated on: 24.  Durga had one unexcused absence during this reporting period.      Dimension1: Acute Intoxication/Withdrawal Potential -   Previous Dimension Ratin  Current Dimension Ratin  Client Goals Addressed Since last Review: \"I will go on more walks, and come to groups as scheduled to keep my mind off of drinking and to gain more insight on my alcohol cravings.\"  Are Treatment Plan goals/methods effective? Yes     Date of Last Use: 24  Any reports of withdrawal symptoms - Client reports that he has been sober since . Client reports improvement in his withdrawal experiences and skillfulness in managing them.      Dimension 2: Biomedical Conditions & Complications -   Previous Dimension Ratin  Current Dimension Ratin  Client Goals Addressed Since last Review: \"I will continue to have my medical needs met and addressed\"  Are Treatment Plan goals/methods effective? Yes     Medical Concerns:  None identified.  Current Medications & Medication Changes:  Current Facility-Administered Medications               Current Outpatient Medications   Medication Sig Dispense Refill    acamprosate (CAMPRAL) 333 MG EC tablet Take 2 tablets (666 mg) by mouth 3 times daily 180 tablet 0    ARIPiprazole (ABILIFY) 5 MG tablet TAKE ONE TABLET (5 MG) BY MOUTH ONE TIME DAILY 90 tablet 1    busPIRone (BUSPAR) 15 MG tablet Take 1 tablet (15 mg) by mouth 2 times daily 180 tablet 1    citalopram (CELEXA) 20 MG tablet Take 1 tablet (20 mg) by mouth daily 90 tablet 1    diclofenac (VOLTAREN) 50 MG EC tablet Take 1 tablet (50 mg) by mouth 3 times daily as needed for moderate pain 30 tablet 0    gabapentin (NEURONTIN) 300 MG capsule Take 2 capsules (600 mg) by mouth 3 times daily for 5 days Increase gabapentin dose 300 mg/day to a target dose of 600 mg " Patient's wife called back with their CORRECTED phone number.  Dosing instructions given and they will recheck INR week of Feb.5.  Shwetha Escobar RN     "TID 30 capsule 0    hydrochlorothiazide (HYDRODIURIL) 12.5 MG tablet Take 1 tablet (12.5 mg) by mouth daily 90 tablet 3    hydrOXYzine HCl (ATARAX) 50 MG tablet Take 0.5-1 tablets (25-50 mg) by mouth every 8 hours as needed for anxiety 180 tablet 3    lisinopril (ZESTRIL) 40 MG tablet Take 1 tablet (40 mg) by mouth daily (Hold for a systolic blood pressure of 100) 90 tablet 3    Melatonin 10 MG TABS tablet Take 10 mg by mouth nightly as needed for sleep        multivitamin w/minerals (THERA-VIT-M) tablet Take 1 tablet by mouth daily 90 tablet 0    naltrexone (DEPADE/REVIA) 50 MG tablet Take 2 tablets (100 mg) by mouth daily 180 tablet 1    simvastatin (ZOCOR) 40 MG tablet Take 1 tablet (40 mg) by mouth every evening 90 tablet 3    thiamine (B-1) 100 MG tablet TAKE 1 TABLET (100 MG) BY MOUTH DAILY 90 tablet 0    traZODone (DESYREL) 50 MG tablet TAKE ONE TABLET BY MOUTH AT BEDTIME AS NEEDED FOR SLEEP 90 tablet 0      No current facility-administered medications for this encounter.         Medication Prescriber:  Dr. Bennett, through [facility]  Taking meds as prescribed? 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 Currently connected to a specialist.        Dimension 3: Emotional/Behavioral Conditions & Complications -   Previous Dimension Ratin  Current Dimension Ratin  Client Goals Addressed Since last Review: \"I want to learn new ways to stay in the present moment.\"  Are Treatment Plan goals/methods effective? Yes        GAD2:        2024    10:56 AM 2024    11:00 AM 2024     1:00 PM   YUSUF-2   Feeling nervous, anxious, or on edge 1 1 1   Not being able to stop or control worrying 0 1 1   YUSUF-2 Total Score 1 2    2 2      PROMIS 10-Global Health (all questions and answers displayed):        2023 "    12:00 PM 12/20/2023    12:00 PM 2/12/2024     5:14 PM 3/20/2024     2:00 PM 4/4/2024     1:00 PM   PROMIS 10   In general, would you say your health is:     Very good       In general, would you say your quality of life is:     Very good       In general, how would you rate your physical health?     Very good       In general, how would you rate your mental health, including your mood and your ability to think?     Very good       In general, how would you rate your satisfaction with your social activities and relationships?     Good       In general, please rate how well you carry out your usual social activities and roles     Good       To what extent are you able to carry out your everyday physical activities such as walking, climbing stairs, carrying groceries, or moving a chair?     Mostly       In the past 7 days, how often have you been bothered by emotional problems such as feeling anxious, depressed, or irritable?     Sometimes       In the past 7 days, how would you rate your fatigue on average?     Mild       In the past 7 days, how would you rate your pain on average, where 0 means no pain, and 10 means worst imaginable pain?     5       In general, would you say your health is: 3 3 4 4 3   In general, would you say your quality of life is: 4 2 4 4 4   In general, how would you rate your physical health? 3 4 4 3 3   In general, how would you rate your mental health, including your mood and your ability to think? 4 3 4 4 4   In general, how would you rate your satisfaction with your social activities and relationships? 2 3 3 4 2   In general, please rate how well you carry out your usual social activities and roles. (This includes activities at home, at work and in your community, and responsibilities as a parent, child, spouse, employee, friend, etc.) 3 2 3 4 2   To what extent are you able to carry out your everyday physical activities such as walking, climbing stairs, carrying groceries, or moving a  "chair? 3 4 4 5 3   In the past 7 days, how often have you been bothered by emotional problems such as feeling anxious, depressed, or irritable? 4 3 3 3 3   In the past 7 days, how would you rate your fatigue on average? 3 3 2 2 2   In the past 7 days, how would you rate your pain on average, where 0 means no pain, and 10 means worst imaginable pain? 3 1 5 4 4   Global Mental Health Score 12 11 14 15 13   Global Physical Health Score 13 15 15 15 13   PROMIS TOTAL - SUBSCORES 25 26 29 30 26      Mental health diagnosis 296.30 (F33.9) Major Depressive Disorder, Recurrent Episode, Unspecified _  300.02 (F41.1) Generalized Anxiety Disorder  Date of last SIB:  None reported  Date of  last SI:  None reported  Date of last HI: None reported  Behavioral Targets:  Reducing intensity and frequency of MH symptoms and increase more instances of coping through it.   Risk factors:  Lives alone, lack of insight on mental health experiences, substance use, isolation.  Protective factors:  positive relationships positive family connections, safe and stable environment, regular sleep, and regular physical activity  Outside mental health related appointments this review period (list provider and reason for visit):  NA  Current MH Assignments:  Complete assignment on emotions that have caused relapses and develop coping tools to manage difficult experiences.      Narrative:  Current Mental Health symptoms include: irritability, restlessness, and \"cabin fever\", difficulty concentrating, fatigue, and low energy, procrastination. Active interventions to stabilize mental health symptoms this week : Attend and participate in groups and programming fully. Begin to build behaviors reflecting self-care and mindfulness outside of programming. Include activities to build a schedule in the evening. Process grief relating to sobriety.    At Discharge: Client reports improvement in mood management and in confidence to engage activities that helps " "him feel well. Client endorses benefits of sobriety and motivation for ongoing recovery work.         Dimension 4: Treatment Acceptance / Resistance -   Previous Dimension Ratin  Current Dimension Ratin  Client Goals Addressed Since last Review: \"Attending programming will help me reduce feelings isolation at home\"  Are Treatment Plan goals/methods effective? Yes     ORYA Diagnosis:  Alcohol Use Disorder   303.90 (F10.20) Severe In early remission,   Commitment to tx process/Stage of change- 2  ROYA assignments - Complete consequences of use assignment.         Narrative - Durga attend programming well. He demonstrate some resistance with group materials and will often go off topic. Willie has trouble focusing on topics of mental health. Durga reports that he likes the \"socializing aspect\" of groups. Durga has a treatment contract and an accountability plan in place to increase motivation for treatment.     Update: Client completed all treatment goals and graduated the program.        Dimension 5: Relapse / Continued Problem Potential -   Previous Dimension Rating:  3  Current Dimension Ratin  Client Goals Addressed Since last Review: \"I want to  use skills to gain more clarity over my substance behaviors.\"  Are Treatment Plan goals/methods effective? Yes     Relapses this week - None  Urges to use - YES, List reports thoughts to use but no urges due to taking anti-craving medications.  UA results - Will be randomly administered.  Recent Results   No results found for this or any previous visit (from the past 672 hour(s)).      Narrative- Willie is unable to identify relapse triggers, lacks coping skills for relapse prevention. Chemical use was in client's environment. Client reports increased motivation for abstinence and external motivators for sobriety. Client has accountability in place.      Dimension 6: Recovery Environment -   Previous Dimension Ratin  Current Dimension Ratin  Client " "Goals Addressed Since last Review: \"I want to see what I can do to build more structured activities, like working on cars more and roller blading.\"  Are Treatment Plan goals/methods effective? Yes     Family Involvement - Patient declined to have family involved   Summarize attendance in family sessions - NA  Family supportive of treatment?  Yes     Community support group attendance - None at this time.  Recreational activities - Working to build structure.      Narrative - Chemical use in the home, Lack of sober / recreational interests, Legal issues. Will be placed on color wheel for legal accountability. Durga is currently looking for work and feeling motivated to apply for jobs. Durga has accountability at home through the court. Durga is following all legal expectations.     Progress made on transition planning goals: Currently working on assignments.      Justification for Continued Treatment at this Level of Care:  Durga graduated.   Treatment coordination activities since last review:  coordination with   Need for peer recovery support referral? Yes: Client denies  Referrals made since last review:  NA     Discharge Planning:  Target Discharge Date/Timeframe:  10/8/24  Target Phase 2 Start:  5/16/24  Target Phase 3 Start: 7/31/24   Med Mgmt Provider/Appt:  NA        Is patient a vulnerable adult?  No     Interdisciplinary Clinical Supervision including: LADC and Mental health professional     *Client received copy of changes: Yes  *Client is aware of right to access a treatment plan review: Yes     Yamila Patterson, MPS, LSW, LADC, LPCC  MI/CD Psychotherapist  [facility]   [address] [place] [address] [address], [place] [address]  Phone: [phone] Fax:  [phone]       Attestation: Dr. Loretta HANNON - Provides oversight and supervision of care.   "

## 2024-10-29 ENCOUNTER — LAB (OUTPATIENT)
Dept: LAB | Facility: CLINIC | Age: 84
End: 2024-10-29
Payer: COMMERCIAL

## 2024-10-29 ENCOUNTER — OFFICE VISIT (OUTPATIENT)
Dept: CARDIOLOGY | Facility: CLINIC | Age: 84
End: 2024-10-29
Payer: COMMERCIAL

## 2024-10-29 VITALS
SYSTOLIC BLOOD PRESSURE: 82 MMHG | DIASTOLIC BLOOD PRESSURE: 58 MMHG | BODY MASS INDEX: 23.06 KG/M2 | HEART RATE: 58 BPM | HEIGHT: 71 IN | OXYGEN SATURATION: 97 % | WEIGHT: 164.7 LBS

## 2024-10-29 DIAGNOSIS — I48.0 PAF (PAROXYSMAL ATRIAL FIBRILLATION) (H): Primary | ICD-10-CM

## 2024-10-29 DIAGNOSIS — I48.0 PAROXYSMAL ATRIAL FIBRILLATION (H): ICD-10-CM

## 2024-10-29 LAB
ERYTHROCYTE [DISTWIDTH] IN BLOOD BY AUTOMATED COUNT: 12.5 % (ref 10–15)
HCT VFR BLD AUTO: 42.5 % (ref 40–53)
HGB BLD-MCNC: 14.3 G/DL (ref 13.3–17.7)
MCH RBC QN AUTO: 31.6 PG (ref 26.5–33)
MCHC RBC AUTO-ENTMCNC: 33.6 G/DL (ref 31.5–36.5)
MCV RBC AUTO: 94 FL (ref 78–100)
PLATELET # BLD AUTO: 208 10E3/UL (ref 150–450)
RBC # BLD AUTO: 4.52 10E6/UL (ref 4.4–5.9)
WBC # BLD AUTO: 6.8 10E3/UL (ref 4–11)

## 2024-10-29 PROCEDURE — 93000 ELECTROCARDIOGRAM COMPLETE: CPT | Performed by: INTERNAL MEDICINE

## 2024-10-29 PROCEDURE — 36415 COLL VENOUS BLD VENIPUNCTURE: CPT | Performed by: INTERNAL MEDICINE

## 2024-10-29 PROCEDURE — 99213 OFFICE O/P EST LOW 20 MIN: CPT | Performed by: INTERNAL MEDICINE

## 2024-10-29 PROCEDURE — 85027 COMPLETE CBC AUTOMATED: CPT | Performed by: INTERNAL MEDICINE
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
## 2025-01-28 NOTE — PROGRESS NOTES
Pt wife called and the Eliquis has arrived.  I have asked that pt wife to set up an INR to be done, as the pt will then hold Warfarin for 2 days and on the third day will have INR checked and if less than 2, will start Eliquis.  Pt has an INR scheduled on Monday at 1530. Marco    n/a

## 2025-03-10 ENCOUNTER — PATIENT OUTREACH (OUTPATIENT)
Dept: CARE COORDINATION | Facility: CLINIC | Age: 85
End: 2025-03-10
Payer: COMMERCIAL

## 2025-03-12 DIAGNOSIS — F34.1 DYSTHYMIA: ICD-10-CM

## 2025-03-12 RX ORDER — SERTRALINE HYDROCHLORIDE 25 MG/1
25 TABLET, FILM COATED ORAL DAILY
Qty: 90 TABLET | Refills: 3 | Status: SHIPPED | OUTPATIENT
Start: 2025-03-12

## 2025-03-12 NOTE — TELEPHONE ENCOUNTER
Medication requested: sertraline (ZOLOFT) 25 MG tablet   Last office visit: 4/9/24  [facility] appointments: none  Medication last refilled: 4/9/24; 90 + 3 refills  Last qualifying labs: No PHQ-9/ZOË-7 documented    Routing refill request to provider for review/approval because:  No PHQ-9/ZOË-7 documented in chart    Arnold HOGUE, RN  03/12/25 3:24 PM

## 2025-03-24 ENCOUNTER — PATIENT OUTREACH (OUTPATIENT)
Dept: CARE COORDINATION | Facility: CLINIC | Age: 85
End: 2025-03-24
Payer: COMMERCIAL

## 2025-04-28 DIAGNOSIS — E03.9 HYPOTHYROIDISM, UNSPECIFIED TYPE: ICD-10-CM

## 2025-04-29 RX ORDER — LEVOTHYROXINE SODIUM 50 UG/1
50 TABLET ORAL DAILY
Qty: 90 TABLET | Refills: 0 | Status: SHIPPED | OUTPATIENT
Start: 2025-04-29 | End: 2025-05-07

## 2025-04-29 NOTE — TELEPHONE ENCOUNTER
Medication requested: levothyroxine (SYNTHROID/LEVOTHROID) 50 MCG tablet   Last office visit: 4/9/24  [facility] appointments: 5/7/25  Medication last refilled: 5/1/24; 90 + 3 refills  Last qualifying labs:   Component      Latest Ref Rng 4/9/2024  2:42 PM   TSH      0.30 - 4.20 uIU/mL 1.45      Medication is being filled for 1 time refill only due to:  Patient needs labs TSH. Patient needs to be seen because it has been more than one year since last visit.    Arnold HOGUE, RN  04/29/25 2:31 PM

## 2025-05-07 ENCOUNTER — OFFICE VISIT (OUTPATIENT)
Dept: FAMILY MEDICINE | Facility: CLINIC | Age: 85
End: 2025-05-07
Payer: COMMERCIAL

## 2025-05-07 VITALS
WEIGHT: 162 LBS | BODY MASS INDEX: 22.68 KG/M2 | RESPIRATION RATE: 16 BRPM | TEMPERATURE: 97.1 F | HEART RATE: 58 BPM | DIASTOLIC BLOOD PRESSURE: 73 MMHG | OXYGEN SATURATION: 97 % | SYSTOLIC BLOOD PRESSURE: 116 MMHG | HEIGHT: 71 IN

## 2025-05-07 DIAGNOSIS — E03.9 ACQUIRED HYPOTHYROIDISM: ICD-10-CM

## 2025-05-07 DIAGNOSIS — E03.9 HYPOTHYROIDISM, UNSPECIFIED TYPE: ICD-10-CM

## 2025-05-07 DIAGNOSIS — R41.3 MEMORY LOSS: ICD-10-CM

## 2025-05-07 DIAGNOSIS — Z13.6 SCREENING FOR CARDIOVASCULAR CONDITION: ICD-10-CM

## 2025-05-07 DIAGNOSIS — E78.2 MIXED HYPERLIPIDEMIA: ICD-10-CM

## 2025-05-07 DIAGNOSIS — F34.1 DYSTHYMIA: ICD-10-CM

## 2025-05-07 DIAGNOSIS — Z00.00 MEDICARE ANNUAL WELLNESS VISIT, SUBSEQUENT: Primary | ICD-10-CM

## 2025-05-07 DIAGNOSIS — G30.9 ALZHEIMER'S DISEASE, UNSPECIFIED (CODE) (H): ICD-10-CM

## 2025-05-07 DIAGNOSIS — Z13.1 SCREENING FOR DIABETES MELLITUS: ICD-10-CM

## 2025-05-07 DIAGNOSIS — E78.00 HYPERCHOLESTEROLEMIA: ICD-10-CM

## 2025-05-07 DIAGNOSIS — I48.20 CHRONIC ATRIAL FIBRILLATION (H): ICD-10-CM

## 2025-05-07 LAB
ALT SERPL W P-5'-P-CCNC: 13 U/L (ref 0–70)
ANION GAP SERPL CALCULATED.3IONS-SCNC: 11 MMOL/L (ref 7–15)
BUN SERPL-MCNC: 33.7 MG/DL (ref 8–23)
CALCIUM SERPL-MCNC: 9.9 MG/DL (ref 8.8–10.4)
CHLORIDE SERPL-SCNC: 103 MMOL/L (ref 98–107)
CHOLEST SERPL-MCNC: 191 MG/DL
CREAT SERPL-MCNC: 1.39 MG/DL (ref 0.67–1.17)
EGFRCR SERPLBLD CKD-EPI 2021: 50 ML/MIN/1.73M2
FASTING STATUS PATIENT QL REPORTED: YES
FASTING STATUS PATIENT QL REPORTED: YES
GLUCOSE SERPL-MCNC: 84 MG/DL (ref 70–99)
HCO3 SERPL-SCNC: 26 MMOL/L (ref 22–29)
HDLC SERPL-MCNC: 48 MG/DL
LDLC SERPL CALC-MCNC: 114 MG/DL
NONHDLC SERPL-MCNC: 143 MG/DL
POTASSIUM SERPL-SCNC: 4.6 MMOL/L (ref 3.4–5.3)
SODIUM SERPL-SCNC: 140 MMOL/L (ref 135–145)
TRIGL SERPL-MCNC: 147 MG/DL
TSH SERPL DL<=0.005 MIU/L-ACNC: 1.44 UIU/ML (ref 0.3–4.2)

## 2025-05-07 PROCEDURE — 84443 ASSAY THYROID STIM HORMONE: CPT | Mod: ORL | Performed by: FAMILY MEDICINE

## 2025-05-07 PROCEDURE — 80061 LIPID PANEL: CPT | Mod: ORL | Performed by: FAMILY MEDICINE

## 2025-05-07 PROCEDURE — 82565 ASSAY OF CREATININE: CPT | Mod: ORL | Performed by: FAMILY MEDICINE

## 2025-05-07 PROCEDURE — 84460 ALANINE AMINO (ALT) (SGPT): CPT | Mod: ORL | Performed by: FAMILY MEDICINE

## 2025-05-07 RX ORDER — DONEPEZIL HYDROCHLORIDE 10 MG/1
10 TABLET, FILM COATED ORAL AT BEDTIME
Qty: 90 TABLET | Refills: 4 | Status: SHIPPED | OUTPATIENT
Start: 2025-05-07

## 2025-05-07 RX ORDER — LEVOTHYROXINE SODIUM 50 UG/1
50 TABLET ORAL DAILY
Qty: 90 TABLET | Refills: 4 | Status: SHIPPED | OUTPATIENT
Start: 2025-05-07

## 2025-05-07 RX ORDER — PRAVASTATIN SODIUM 10 MG
10 TABLET ORAL DAILY
Qty: 90 TABLET | Refills: 4 | Status: SHIPPED | OUTPATIENT
Start: 2025-05-07

## 2025-05-07 RX ORDER — MEMANTINE HYDROCHLORIDE 10 MG/1
10 TABLET ORAL AT BEDTIME
Qty: 90 TABLET | Refills: 4 | Status: SHIPPED | OUTPATIENT
Start: 2025-05-07

## 2025-05-07 RX ORDER — SERTRALINE HYDROCHLORIDE 25 MG/1
25 TABLET, FILM COATED ORAL DAILY
Qty: 90 TABLET | Refills: 4 | Status: SHIPPED | OUTPATIENT
Start: 2025-05-07

## 2025-05-07 RX ORDER — PRAVASTATIN SODIUM 20 MG
TABLET ORAL
Qty: 90 TABLET | Refills: 4 | Status: SHIPPED | OUTPATIENT
Start: 2025-05-07

## 2025-05-07 ASSESSMENT — PATIENT HEALTH QUESTIONNAIRE - PHQ9
SUM OF ALL RESPONSES TO PHQ QUESTIONS 1-9: 0
SUM OF ALL RESPONSES TO PHQ QUESTIONS 1-9: 0

## 2025-05-07 ASSESSMENT — ANXIETY QUESTIONNAIRES: 7. FEELING AFRAID AS IF SOMETHING AWFUL MIGHT HAPPEN: NOT AT ALL

## 2025-05-07 NOTE — PROGRESS NOTES
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    He is struggling with significant Alzheimer's disease.  He did not really offer anything during today's visit and his wife provided most of the context and asked all of the questions.    She knows to keep \"shakes\" especially at rest and often while he is asleep.  He is having some balance issues and I offered physical therapy.    He has a number of skin changes and she would like me to look at that.    He needs refills on all of his medications and I will certainly do care of that.    From a healthcare maintenance standpoint, he does not have any care gaps and he is completely up-to-date with all recommended healthcare maintenance strategies.        4/9/2024   General Health   How would you rate your overall physical health? Excellent   Feel stress (tense, anxious, or unable to sleep) Not at all         4/9/2024   Nutrition   Diet: Regular (no restrictions)         4/9/2024   Exercise   Days per week of moderate/strenous exercise 1 day         4/9/2024   Social Factors   Frequency of gathering with friends or relatives Once a week   Worry food won't last until get money to buy more No   Food not last or not have enough money for food? No   Do you have housing? (Housing is defined as stable permanent housing and does not include staying outside in a car, in a tent, in an abandoned building, in an overnight shelter, or couch-surfing.) Yes   Are you worried about losing your housing? No   Lack of transportation? No   Unable to get utilities (heat,electricity)? No         4/9/2024   Activities of Daily Living- Home Safety   Needs help with the following daily activites None of the above   Safety concerns " in the home None of the above         2024   Dental   Dentist two times every year? Yes         2024   Hearing Screening   Hearing concerns? None of the above           2024   Urinary Incontinence Screening   Bothered by leaking urine in past 6 months No       Today's PHQ-9 Score:       2025     1:46 PM   PHQ-9 SCORE   PHQ-9 Total Score MyChart 0   PHQ-9 Total Score 0        Patient-reported         2024   Substance Use   Alcohol more than 3/day or more than 7/wk No   Do you have a current opioid prescription? No   How severe/bad is pain from 1 to 10? 1/10   Do you use any other substances recreationally? (!) ALCOHOL    (!) PRESCRIPTION DRUGS    (!) DECLINE       Multiple values from one day are sorted in reverse-chronological order     Social History     Tobacco Use    Smoking status: Former     Current packs/day: 0.00     Average packs/day: 1 pack/day for 28.0 years (28.0 ttl pk-yrs)     Types: Cigarettes     Start date:      Quit date:      Years since quittin.3     Passive exposure: Past    Smokeless tobacco: Never   Vaping Use    Vaping status: Never Used   Substance Use Topics    Alcohol use: Yes     Alcohol/week: 7.0 standard drinks of alcohol     Types: 7 Standard drinks or equivalent per week     Comment: 1 drink of wine per day    Drug use: No     The following health maintenance items are reviewed in Epic and correct as of today:  Health Maintenance   Topic Date Due    DEPRESSION ACTION PLAN  Never done    RSV VACCINE (1 - 1-dose 75+ series) Never done    COVID-19 Vaccine ( season) 2025    BMP  2025    LIPID  2025    FALL RISK ASSESSMENT  2025    MEDICARE ANNUAL WELLNESS VISIT  2025    TSH W/FREE T4 REFLEX  2025    DTAP/TDAP/TD IMMUNIZATION (2 - Td or Tdap) 10/29/2025    PHQ-9  2025    ADVANCE CARE PLANNING  2028    INFLUENZA VACCINE  Completed    Pneumococcal Vaccine: 50+ Years  Completed    ZOSTER IMMUNIZATION   "Completed    HPV IMMUNIZATION  Aged Out    MENINGITIS IMMUNIZATION  Aged Out     Review of Systems  Constitutional, neuro, ENT, endocrine, pulmonary, cardiac, gastrointestinal, genitourinary, musculoskeletal, integument and psychiatric systems are negative, except as otherwise noted.     Objective    Exam  /73 (BP Location: Left arm, Patient Position: Sitting, Cuff Size: Adult Regular)   Pulse 58   Temp 97.1  F (36.2  C) (Skin)   Resp 16   Ht 1.8 m (5' 10.87\")   Wt 73.5 kg (162 lb)   SpO2 97%   BMI 22.68 kg/m       Estimated body mass index is 22.68 kg/m  as calculated from the following:    Height as of this encounter: 1.8 m (5' 10.87\").    Weight as of this encounter: 73.5 kg (162 lb).    Physical Exam  GENERAL: alert and no distress  EYES: Eyes grossly normal to inspection, PERRL and conjunctivae and sclerae normal  HENT: ear canals and TM's normal, nose and mouth without ulcers or lesions  NECK: no adenopathy, no asymmetry, masses, or scars  RESP: lungs clear to auscultation - no rales, rhonchi or wheezes  CV: regular rate and rhythm, normal S1 S2, no S3 or S4, no murmur, click or rub, no peripheral edema  MS: no gross musculoskeletal defects noted, no edema  SKIN: no suspicious lesions or rashes.  Fredrick has a very large number of seborrheic keratoses on his body, primarily on his back.  These have not changed and the benign nature of them was discussed.  NEURO: Normal strength and tone, mentation intact and speech normal.  No cogwheeling noted in his upper arms and nose stiffness with pronation and supination of the forearms.  No overt masked facies.  PSYCH: mentation appears normal, affect normal/bright        4/9/2024   Mini Cog   Mini-Cog Not Completed (choose reason) Known dementia      Laboratory studies will include the following: Basic metabolic panel, lipid panel, ALT, and TSH with cascade.      Assessment/Plan:    Fredrick is an 84-year-old with significant Alzheimer's who is here today for " his Medicare annual wellness visit, subsequent.  He is up-to-date with all recommended healthcare maintenance strategies and does not have a single care gap at this time.    He will be notified of therelab results when they are available.    Refills were sent in today for his apixaban, donepezil, memantine, levothyroxine, sertraline, and his to pravastatin prescriptions (1 for 10 mg, and 1 for 20 mg, 1 of each taken together once daily).    I look forward to seeing Fredrick back in about 1 year.  He and his wife will reach out the meantime with any questions or concerns.      Signed Electronically by: Pieter Ulloa MD

## 2025-05-07 NOTE — PATIENT INSTRUCTIONS
Get a Covid booster this spring at your pharmacy.  Please consider doing PT to help you with your balance.

## 2025-05-07 NOTE — NURSING NOTE
"Fredrick  84 year old    Chief Complaint   Patient presents with    Wellness Visit            Blood pressure 116/73, pulse 58, temperature 97.1  F (36.2  C), temperature source Skin, resp. rate 16, height 1.8 m (5' 10.87\"), weight 73.5 kg (162 lb), SpO2 97%. Body mass index is 22.68 kg/m .    Patient Active Problem List   Diagnosis    Atrial fibrillation (H)    Hypercholesterolemia    Hypothyroidism    Glaucoma    Long-term (current) use of anticoagulants [Z79.01]    Chronic atrial fibrillation (H)    Acquired hypothyroidism    Memory loss    Primary open angle glaucoma of both eyes, moderate stage    Medicare annual wellness visit, subsequent    Other urinary incontinence    Dizziness    Alzheimer's disease, unspecified (CODE) (H)              Wt Readings from Last 2 Encounters:   05/07/25 73.5 kg (162 lb)   10/29/24 74.7 kg (164 lb 11.2 oz)       BP Readings from Last 3 Encounters:   05/07/25 116/73   10/29/24 (!) 82/58   08/28/24 98/66                Current Outpatient Medications   Medication Sig Dispense Refill    acetaminophen (TYLENOL) 325 MG tablet Take 1-2 tablets (325-650 mg) by mouth every 6 hours as needed for mild pain      apixaban ANTICOAGULANT (ELIQUIS) 5 MG tablet Take 1 tablet (5 mg) by mouth 2 times daily. Appointment required for further refills 180 tablet 4    Cetirizine HCl (ZYRTEC ALLERGY PO) Take 10 mg by mouth daily as needed      donepezil (ARICEPT) 10 MG tablet Take 1 tablet (10 mg) by mouth At Bedtime 30 tablet 0    latanoprost (XALATAN) 0.005 % ophthalmic solution Place 1 drop into both eyes At Bedtime      levothyroxine (SYNTHROID/LEVOTHROID) 50 MCG tablet Take 1 tablet (50 mcg) by mouth daily. 90 tablet 0    memantine (NAMENDA) 10 MG tablet Take 10 mg by mouth At Bedtime      pravastatin (PRAVACHOL) 10 MG tablet Take 1 tablet (10 mg) by mouth daily 90 tablet 3    pravastatin (PRAVACHOL) 20 MG tablet TAKE ONE TABLET BY MOUTH EVERY EVENING. TAKE TOGETHER WITH 10MG 90 tablet 3    sertraline " "(ZOLOFT) 25 MG tablet Take 1 tablet (25 mg) by mouth daily. 90 tablet 3     No current facility-administered medications for this visit.              Social History     Tobacco Use    Smoking status: Former     Current packs/day: 0.00     Average packs/day: 1 pack/day for 28.0 years (28.0 ttl pk-yrs)     Types: Cigarettes     Start date:      Quit date:      Years since quittin.3     Passive exposure: Past    Smokeless tobacco: Never   Vaping Use    Vaping status: Never Used   Substance Use Topics    Alcohol use: Yes     Alcohol/week: 7.0 standard drinks of alcohol     Types: 7 Standard drinks or equivalent per week     Comment: 1 drink of wine per day    Drug use: No              Health Maintenance Due   Topic Date Due    DEPRESSION ACTION PLAN  Never done    RSV VACCINE (1 - 1-dose 75+ series) Never done    COVID-19 Vaccine (8 - 2024-25 season) 2025    BMP  2025    LIPID  2025    FALL RISK ASSESSMENT  2025    MEDICARE ANNUAL WELLNESS VISIT  2025    TSH W/FREE T4 REFLEX  2025            No results found for: \"PAP\"           May 7, 2025 3:04 PM  "